# Patient Record
Sex: FEMALE | Race: WHITE | NOT HISPANIC OR LATINO | Employment: FULL TIME | URBAN - METROPOLITAN AREA
[De-identification: names, ages, dates, MRNs, and addresses within clinical notes are randomized per-mention and may not be internally consistent; named-entity substitution may affect disease eponyms.]

---

## 2017-02-08 ENCOUNTER — COMMUNICATION - HEALTHEAST (OUTPATIENT)
Dept: SURGERY | Facility: CLINIC | Age: 52
End: 2017-02-08

## 2017-08-12 ENCOUNTER — HEALTH MAINTENANCE LETTER (OUTPATIENT)
Age: 52
End: 2017-08-12

## 2017-08-15 ENCOUNTER — COMMUNICATION - HEALTHEAST (OUTPATIENT)
Dept: SURGERY | Facility: CLINIC | Age: 52
End: 2017-08-15

## 2017-08-15 ENCOUNTER — AMBULATORY - HEALTHEAST (OUTPATIENT)
Dept: SURGERY | Facility: CLINIC | Age: 52
End: 2017-08-15

## 2017-08-15 DIAGNOSIS — K90.9 UNSPECIFIED INTESTINAL MALABSORPTION: ICD-10-CM

## 2017-08-15 DIAGNOSIS — Z98.84 S/P BARIATRIC SURGERY: ICD-10-CM

## 2017-08-15 DIAGNOSIS — K91.2 OTHER AND UNSPECIFIED POSTSURGICAL NONABSORPTION: ICD-10-CM

## 2017-09-27 ENCOUNTER — OFFICE VISIT - HEALTHEAST (OUTPATIENT)
Dept: SURGERY | Facility: CLINIC | Age: 52
End: 2017-09-27

## 2017-09-27 ENCOUNTER — RECORDS - HEALTHEAST (OUTPATIENT)
Dept: ADMINISTRATIVE | Facility: OTHER | Age: 52
End: 2017-09-27

## 2017-09-27 DIAGNOSIS — K91.2 POSTOPERATIVE MALABSORPTION: ICD-10-CM

## 2017-09-27 DIAGNOSIS — E66.01 MORBID OBESITY (H): ICD-10-CM

## 2017-09-27 ASSESSMENT — MIFFLIN-ST. JEOR: SCORE: 1854.61

## 2017-12-20 ENCOUNTER — COMMUNICATION - HEALTHEAST (OUTPATIENT)
Dept: SURGERY | Facility: CLINIC | Age: 52
End: 2017-12-20

## 2018-07-06 ENCOUNTER — RADIANT APPOINTMENT (OUTPATIENT)
Dept: CARDIOLOGY | Facility: CLINIC | Age: 53
End: 2018-07-06
Payer: COMMERCIAL

## 2018-07-06 ENCOUNTER — OFFICE VISIT (OUTPATIENT)
Dept: CARDIOLOGY | Facility: CLINIC | Age: 53
End: 2018-07-06
Attending: INTERNAL MEDICINE
Payer: COMMERCIAL

## 2018-07-06 VITALS
HEIGHT: 66 IN | BODY MASS INDEX: 47.09 KG/M2 | DIASTOLIC BLOOD PRESSURE: 57 MMHG | HEART RATE: 65 BPM | WEIGHT: 293 LBS | SYSTOLIC BLOOD PRESSURE: 121 MMHG | OXYGEN SATURATION: 96 %

## 2018-07-06 DIAGNOSIS — Z95.2 S/P AVR: ICD-10-CM

## 2018-07-06 DIAGNOSIS — Z95.2 S/P AVR: Primary | ICD-10-CM

## 2018-07-06 PROCEDURE — 99213 OFFICE O/P EST LOW 20 MIN: CPT | Mod: ZP | Performed by: INTERNAL MEDICINE

## 2018-07-06 PROCEDURE — G0463 HOSPITAL OUTPT CLINIC VISIT: HCPCS | Mod: ZF

## 2018-07-06 ASSESSMENT — PAIN SCALES - GENERAL: PAINLEVEL: NO PAIN (0)

## 2018-07-06 NOTE — NURSING NOTE
Vitals completed successfully and medication reconciled. SMA Can  Chief Complaint   Patient presents with     Follow Up For     2 Yr F/U S/P AVR with Echo

## 2018-07-06 NOTE — MR AVS SNAPSHOT
After Visit Summary   7/6/2018    Halina Ibrahim    MRN: [id number]           Patient Information     Date Of Birth          1965        Visit Information        Provider Department      7/6/2018 1:00 PM Joshua Jamison MD [facility]        Today's Diagnoses     S/P AVR    -  1       Follow-ups after your visit        Additional Services     Follow-Up with Cardiologist                 Future tests that were ordered for you today     Open Future Orders        Priority Expected Expires Ordered    Follow-Up with Cardiologist Routine 7/5/2020 10/3/2020 7/6/2018            Who to contact     If you have questions or need follow up information about today's clinic visit or your schedule please contact [facility] directly at [phone].  Normal or non-critical lab and imaging results will be communicated to you by Savalanchehart, letter or phone within 4 business days after the clinic has received the results. If you do not hear from us within 7 days, please contact the clinic through Savalanchehart or phone. If you have a critical or abnormal lab result, we will notify you by phone as soon as possible.  Submit refill requests through Thinktwice or call your pharmacy and they will forward the refill request to us. Please allow 3 business days for your refill to be completed.          Additional Information About Your Visit        MyChart Information     Thinktwice gives you secure access to your electronic health record. If you see a primary care provider, you can also send messages to your care team and make appointments. If you have questions, please call your primary care clinic.  If you do not have a primary care provider, please call [phone] and they will assist you.        Care EveryWhere ID     This is your Care EveryWhere ID. This could be used by other organizations to access your [facility] medical records  [id number]        Your Vitals Were     Pulse Height Last Period Pulse  "Oximetry BMI (Body Mass Index)       65 1.676 m (5' 6\") (LMP Unknown) 96% 47.42 kg/m2        Blood Pressure from Last 3 Encounters:   07/06/18 121/57   08/08/16 132/84   08/04/14 141/82    Weight from Last 3 Encounters:   07/06/18 133.3 kg (293 lb 12.8 oz)   08/08/16 119.7 kg (263 lb 14.4 oz)   08/04/14 109.6 kg (241 lb 11.2 oz)               Primary Care Provider Office Phone # Fax #    Jeremias Garibay [phone]       [facility] [address] [address]        Equal Access to Services     RAUL MILAN : Hadii jefferson ribeiroo Sosia, waaxda luqadaha, qaybta kaalmada adeegyada, carlos a sheffield. So [facility] [phone].    ATENCIÓN: Si habla español, tiene a robb disposición servicios gratuitos de asistencia lingüística. [facility] [phone].    We comply with applicable federal civil rights laws and [place] laws. We do not discriminate on the basis of race, color, national origin, age, disability, sex, sexual orientation, or gender identity.            Thank you!     Thank you for choosing [facility]  for your care. Our goal is always to provide you with excellent care. Hearing back from our patients is one way we can continue to improve our services. Please take a few minutes to complete the written survey that you may receive in the mail after your visit with us. Thank you!             Your Updated Medication List - Protect others around you: Learn how to safely use, store and throw away your medicines at www.disposemymeds.org.          This list is accurate as of 7/6/18  3:43 PM.  Always use your most recent med list.                   Brand Name Dispense Instructions for use Diagnosis    ACETAMINOPHEN 8 HOUR 650 MG 8 hour tablet   Generic drug:  acetaminophen     250 tablet    Take 650 mg by mouth every 4 hours as needed.    S/P AVR       ACZONE 5 % Gel   Generic drug:  Dapsone           BIOTIN PO      Take 10,000 mcg by mouth daily     "    calcium 600 MG tablet      Take 1 tablet by mouth 2 times daily.        CHELATED IRON PO      25 mg. Take 1/2 tablet daily.        DAILY MULTIVITAMIN PO      Take 1 twice a day        gabapentin 300 MG capsule    NEURONTIN     Take 4 capsules by mouth daily.        LEVOTHROID 100 MCG tablet   Generic drug:  levothyroxine      Take 1 tablet by mouth daily.        losartan 50 MG tablet    COZAAR    90 tablet    Take 1 tablet by mouth daily.    Hypertension       oxyCODONE 40 MG 12 hr tablet    OxyCONTIN    24 tablet    Take 1 tablet by mouth 2 times daily.    S/P AVR       SUPER B COMPLEX Tabs      Take 1 BID        tretinoin 0.05 % cream    RETIN-A     Apply topically At Bedtime Apply every other day        vitamin D 25653 UNIT capsule    ERGOCALCIFEROL     Take 50,000 Units by mouth every 7 days.        warfarin 2.5 MG tablet    COUMADIN    90 tablet    Take 2 tablets by mouth daily.    S/P AVR

## 2018-07-06 NOTE — LETTER
7/6/2018      RE: Halina Ibrahim  [address] [address]       Dear Colleague,    Thank you for the opportunity to participate in the care of your patient, Halina Ibrahim, at the [facility]. Please see a copy of my visit note below.    CARDIOLOGY CLINIC FOLLOW UP    HPI: Halina Ibrahim is a 52 year old female being seen today for follow up of her valvular heart disease. She is status post MVR in 2010 with 27mm bileaflet [employer] mechanical valve. Subsequently she was found to have severe AI and underwent AVR in 2013 with 21 mm bileaflet [employer] mechanical valve. 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     She presents to the clinic today for a two year follow up. She has been doing well. She is not very active and doesn't follow an exercise routine. She denies having any chest pain, shortness of breath or lower extremities edema. Although she has had stable INRs, she experienced an episode of GI bleeding while vacationing in the [facility]. She had 5 units of PRBCs. Endoscopy was negative and bleeding problems was attributed to high INR. She has not experienced bruising or labile INRs here.    PAST MEDICAL HISTORY:  Past Medical History:   Diagnosis Date     Aortic regurgitation      S/P gastric bypass      S/P MVR (mitral valve replacement)      Stroke (H)        CURRENT MEDICATIONS:  Current Outpatient Prescriptions   Medication Sig Dispense Refill     ACETAMINOPHEN 8 HOUR 650 MG TABS Take 650 mg by mouth every 4 hours as needed. 250 tablet 0     B Complex-C (SUPER B COMPLEX) TABS Take 1 BID       BIOTIN PO Take 10,000 mcg by mouth daily       Calcium 600 MG tablet Take 1 tablet by mouth 2 times daily.       CHELATED IRON PO 25 mg. Take 1/2 tablet daily.        Dapsone (ACZONE) 5 % GEL        gabapentin (NEURONTIN) 300 MG capsule Take 4 capsules by mouth daily.       levothyroxine  (LEVOTHROID) 100 MCG tablet Take 1 tablet by mouth daily.       losartan (COZAAR) 50 MG tablet Take 1 tablet by mouth daily. 90 tablet 3     Multiple Vitamin (DAILY MULTIVITAMIN PO) Take 1 twice a day       oxyCODONE (OXYCONTIN) 40 MG 12 hr tablet Take 1 tablet by mouth 2 times daily. 24 tablet 0     tretinoin (RETIN-A) 0.05 % cream Apply topically At Bedtime Apply every other day       vitamin D (ERGOCALCIFEROL) 22054 UNIT capsule Take 50,000 Units by mouth every 7 days.       warfarin (COUMADIN) 2.5 MG tablet Take 2 tablets by mouth daily. 90 tablet 4     PAST SURGICAL HISTORY:  Past Surgical History:   Procedure Laterality Date     REDO STERNOTOMY REPLACE VALVE AORTIC  3/14/2013    Procedure: REDO STERNOTOMY REPLACE VALVE AORTIC;  Redo-Median  Sternotomy, Aortic root enlargement . aortic Valve  Replacement  on pump oxygenator;  Surgeon: Buck Paula MD;  Location:  OR     s/p gastric bypass[       s/p mitral valve replacement[       ALLERGIES  Penicillins    FAMILY HX:  No history of premature CAD and SCD.    SOCIAL HX:  History     Social History     Marital Status: Single     Spouse Name: N/A     Number of Children: N/A     Years of Education: N/A     Social History Main Topics     Smoking status: Former Smoker     Types: Cigarettes     Quit date: 03/17/2010     Smokeless tobacco: Never Used     Alcohol Use: No     Drug Use: No     Sexually Active: None     ROS:  Constitutional: No fever, chills, or sweats. No weight gain/loss. Positive for fatigue which is chronic  HEENT: No visual disturbance, ear ache, epistaxis, sore throat.   Allergies/Immunologic: Positive.   Respiratory: No cough, hemoptysis.   Cardiovascular: As per HPI.   GI: No nausea, vomiting, hematemesis, melena, or hematochezia.   : No urinary frequency, dysuria, or hematuria.   Integument: No rash.   Psychiatric: No anxiety / depression.   Neuro: No speech disturbance, focal sensory or motor deficit.   Endocrinology: No polyuria /  "polyphagia.   Musculoskeletal: No myalgia.    VITAL SIGNS:  /57 (BP Location: Left arm, Patient Position: Chair, Cuff Size: Adult Large)  Pulse 65  Ht 1.676 m (5' 6\")  Wt 133.3 kg (293 lb 12.8 oz)  LMP  (LMP Unknown)  SpO2 96%  BMI 47.42 kg/m2  Body mass index is 47.42 kg/(m^2).  Wt Readings from Last 2 Encounters:   07/06/18 133.3 kg (293 lb 12.8 oz)   08/08/16 119.7 kg (263 lb 14.4 oz)       PHYSICAL EXAM  Halina Ibrahim is a 50 year old female.in no acute distress.  HEENT: Unremarkable.  Neck: JVP normal.  Carotids +3/3 bilaterally without bruits.  Lungs: CTA.  Cor: RRR. Normal S1 and S2.  Aortic and mitral click present. The sternum is stable with minimal tenderness in the lower segment.  Abd: Soft, nontender, nondistended.  NABS.  No pulsatile mass.  Extremities: 1+ TAMIA.   Pulses +3/3 symmetric in upper and lower extremities.  Neuro: Grossly intact.    LABS  Labs were reviewed in care-everywhere. Patient had had normal CBC, BMP and lipid panel checked within last three months. LDL 85. INRs have been therapeutic.     ECHOCARDIOGRAM: 8/8/2016   Normal LV function, normal functioning aortic and mitral valve (MV mean gradient is 5 mmHg at HR of 60 and the aortic valve mean gradient is 26 mmHg). She had normal RVSP and normal IVC size reflecting normal volume status.     ASSESSMENT AND PLAN:   1. S/p MVR with normal functioning prosthesis.   2. S/p AVR with normal functioning prosthesis.  3. Anticoagulation with coumadin: adequate INRs with goal of 2.5-3.5.   3. Plan:  -- RTC in 24 months.  -- Encourage healthy diet and regular exercise to promote heart health and weight loss. The patient has gained 30 lbs between this and her clinic appointment in 2016.    Please do not hesitate to contact me if you have any questions/concerns.     Sincerely,     Joshua Jamison MD    CC  Patient Care Team:  Jeremias Garibay as PCP - General    "

## 2018-07-06 NOTE — NURSING NOTE
Med Reconcile: Reviewed and verified all current medications with the patient. The updated medication list was printed and given to the patient.  Return Appointment: Follow up in 2 years. Patient given instructions regarding scheduling next clinic visit. Patient demonstrated understanding of this information and agreed to call with further questions or concerns.  Patient stated she understood all health information given and agreed to call with further questions or concerns.

## 2018-07-06 NOTE — PROGRESS NOTES
CARDIOLOGY CLINIC FOLLOW UP    HPI: Halina Ibrahim is a 52 year old female being seen today for follow up of her valvular heart disease. She is status post MVR in 2010 with 27mm bileaflet [employer] [place] mechanical valve. Subsequently she was found to have severe AI and underwent AVR in 2013 with 21 mm bileaflet [employer] [place] mechanical valve. 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     She presents to the clinic today for a two year follow up. She has been doing well. She is not very active and doesn't follow an exercise routine. She denies having any chest pain, shortness of breath or lower extremities edema. Although she has had stable INRs, she experienced an episode of GI bleeding while vacationing in the [facility]. She had 5 units of PRBCs. Endoscopy was negative and bleeding problems was attributed to high INR. She has not experienced bruising or labile INRs here.    PAST MEDICAL HISTORY:  Past Medical History:   Diagnosis Date     Aortic regurgitation      S/P gastric bypass      S/P MVR (mitral valve replacement)      Stroke (H)        CURRENT MEDICATIONS:  Current Outpatient Prescriptions   Medication Sig Dispense Refill     ACETAMINOPHEN 8 HOUR 650 MG TABS Take 650 mg by mouth every 4 hours as needed. 250 tablet 0     B Complex-C (SUPER B COMPLEX) TABS Take 1 BID       BIOTIN PO Take 10,000 mcg by mouth daily       Calcium 600 MG tablet Take 1 tablet by mouth 2 times daily.       CHELATED IRON PO 25 mg. Take 1/2 tablet daily.        Dapsone (ACZONE) 5 % GEL        gabapentin (NEURONTIN) 300 MG capsule Take 4 capsules by mouth daily.       levothyroxine (LEVOTHROID) 100 MCG tablet Take 1 tablet by mouth daily.       losartan (COZAAR) 50 MG tablet Take 1 tablet by mouth daily. 90 tablet 3     Multiple Vitamin (DAILY MULTIVITAMIN PO) Take 1 twice a day       oxyCODONE (OXYCONTIN) 40 MG 12 hr tablet Take 1 tablet by mouth 2 times daily. 24 tablet 0      "tretinoin (RETIN-A) 0.05 % cream Apply topically At Bedtime Apply every other day       vitamin D (ERGOCALCIFEROL) 22159 UNIT capsule Take 50,000 Units by mouth every 7 days.       warfarin (COUMADIN) 2.5 MG tablet Take 2 tablets by mouth daily. 90 tablet 4     PAST SURGICAL HISTORY:  Past Surgical History:   Procedure Laterality Date     REDO STERNOTOMY REPLACE VALVE AORTIC  3/14/2013    Procedure: REDO STERNOTOMY REPLACE VALVE AORTIC;  Redo-Median  Sternotomy, Aortic root enlargement . aortic Valve  Replacement  on pump oxygenator;  Surgeon: Buck Paula MD;  Location:  OR     s/p gastric bypass[       s/p mitral valve replacement[       ALLERGIES  Penicillins    FAMILY HX:  No history of premature CAD and SCD.    SOCIAL HX:  History     Social History     Marital Status: Single     Spouse Name: N/A     Number of Children: N/A     Years of Education: N/A     Social History Main Topics     Smoking status: Former Smoker     Types: Cigarettes     Quit date: 03/17/2010     Smokeless tobacco: Never Used     Alcohol Use: No     Drug Use: No     Sexually Active: None     ROS:  Constitutional: No fever, chills, or sweats. No weight gain/loss. Positive for fatigue which is chronic  HEENT: No visual disturbance, ear ache, epistaxis, sore throat.   Allergies/Immunologic: Positive.   Respiratory: No cough, hemoptysis.   Cardiovascular: As per HPI.   GI: No nausea, vomiting, hematemesis, melena, or hematochezia.   : No urinary frequency, dysuria, or hematuria.   Integument: No rash.   Psychiatric: No anxiety / depression.   Neuro: No speech disturbance, focal sensory or motor deficit.   Endocrinology: No polyuria / polyphagia.   Musculoskeletal: No myalgia.    VITAL SIGNS:  /57 (BP Location: Left arm, Patient Position: Chair, Cuff Size: Adult Large)  Pulse 65  Ht 1.676 m (5' 6\")  Wt 133.3 kg (293 lb 12.8 oz)  LMP  (LMP Unknown)  SpO2 96%  BMI 47.42 kg/m2  Body mass index is 47.42 kg/(m^2).  Wt " Readings from Last 2 Encounters:   07/06/18 133.3 kg (293 lb 12.8 oz)   08/08/16 119.7 kg (263 lb 14.4 oz)       PHYSICAL EXAM  Halina Ibrahim is a 50 year old female.in no acute distress.  HEENT: Unremarkable.  Neck: JVP normal.  Carotids +3/3 bilaterally without bruits.  Lungs: CTA.  Cor: RRR. Normal S1 and S2.  Aortic and mitral click present. The sternum is stable with minimal tenderness in the lower segment.  Abd: Soft, nontender, nondistended.  NABS.  No pulsatile mass.  Extremities: 1+ TAMIA.   Pulses +3/3 symmetric in upper and lower extremities.  Neuro: Grossly intact.    LABS  Labs were reviewed in care-everywhere. Patient had had normal CBC, BMP and lipid panel checked within last three months. LDL 85. INRs have been therapeutic.     ECHOCARDIOGRAM: 8/8/2016   Normal LV function, normal functioning aortic and mitral valve (MV mean gradient is 5 mmHg at HR of 60 and the aortic valve mean gradient is 26 mmHg). She had normal RVSP and normal IVC size reflecting normal volume status.     ASSESSMENT AND PLAN:   1. S/p MVR with normal functioning prosthesis.   2. S/p AVR with normal functioning prosthesis.  3. Anticoagulation with coumadin: adequate INRs with goal of 2.5-3.5.   3. Plan:  -- RTC in 24 months.  -- Encourage healthy diet and regular exercise to promote heart health and weight loss. The patient has gained 30 lbs between this and her clinic appointment in 2016.    CC  Patient Care Team:  Jeremias Garibay as PCP - General

## 2018-07-17 ENCOUNTER — AMBULATORY - HEALTHEAST (OUTPATIENT)
Dept: SURGERY | Facility: CLINIC | Age: 53
End: 2018-07-17

## 2018-07-17 DIAGNOSIS — E78.5 DYSLIPIDEMIA: ICD-10-CM

## 2018-07-17 DIAGNOSIS — Z98.84 HISTORY OF ROUX-EN-Y GASTRIC BYPASS: ICD-10-CM

## 2018-07-17 DIAGNOSIS — K90.9 INTESTINAL MALABSORPTION: ICD-10-CM

## 2018-09-18 ENCOUNTER — COMMUNICATION - HEALTHEAST (OUTPATIENT)
Dept: SURGERY | Facility: CLINIC | Age: 53
End: 2018-09-18

## 2018-09-19 ENCOUNTER — AMBULATORY - HEALTHEAST (OUTPATIENT)
Dept: SURGERY | Facility: CLINIC | Age: 53
End: 2018-09-19

## 2018-09-19 ENCOUNTER — COMMUNICATION - HEALTHEAST (OUTPATIENT)
Dept: SURGERY | Facility: CLINIC | Age: 53
End: 2018-09-19

## 2018-09-19 DIAGNOSIS — K91.2 POSTOPERATIVE MALABSORPTION: ICD-10-CM

## 2018-09-26 ENCOUNTER — COMMUNICATION - HEALTHEAST (OUTPATIENT)
Dept: SURGERY | Facility: CLINIC | Age: 53
End: 2018-09-26

## 2018-09-26 ENCOUNTER — OFFICE VISIT - HEALTHEAST (OUTPATIENT)
Dept: SURGERY | Facility: CLINIC | Age: 53
End: 2018-09-26

## 2018-09-26 DIAGNOSIS — E66.01 MORBID OBESITY (H): ICD-10-CM

## 2018-09-26 DIAGNOSIS — K91.2 POSTOPERATIVE MALABSORPTION: ICD-10-CM

## 2018-09-26 ASSESSMENT — MIFFLIN-ST. JEOR: SCORE: 1945.33

## 2018-10-01 ENCOUNTER — AMBULATORY - HEALTHEAST (OUTPATIENT)
Dept: SURGERY | Facility: CLINIC | Age: 53
End: 2018-10-01

## 2018-10-01 DIAGNOSIS — K91.2 POSTOPERATIVE MALABSORPTION: ICD-10-CM

## 2018-10-01 DIAGNOSIS — Z78.0 MENOPAUSE: ICD-10-CM

## 2018-10-24 ENCOUNTER — RECORDS - HEALTHEAST (OUTPATIENT)
Dept: BONE DENSITY | Facility: CLINIC | Age: 53
End: 2018-10-24

## 2018-10-24 ENCOUNTER — RECORDS - HEALTHEAST (OUTPATIENT)
Dept: ADMINISTRATIVE | Facility: OTHER | Age: 53
End: 2018-10-24

## 2018-10-24 DIAGNOSIS — Z78.0 ASYMPTOMATIC MENOPAUSAL STATE: ICD-10-CM

## 2018-10-24 DIAGNOSIS — K91.2 POSTSURGICAL MALABSORPTION, NOT ELSEWHERE CLASSIFIED: ICD-10-CM

## 2018-12-06 ENCOUNTER — RECORDS - HEALTHEAST (OUTPATIENT)
Dept: ADMINISTRATIVE | Facility: OTHER | Age: 53
End: 2018-12-06

## 2018-12-20 ENCOUNTER — COMMUNICATION - HEALTHEAST (OUTPATIENT)
Dept: SURGERY | Facility: CLINIC | Age: 53
End: 2018-12-20

## 2018-12-20 DIAGNOSIS — K91.2 POSTOPERATIVE MALABSORPTION: ICD-10-CM

## 2018-12-26 ENCOUNTER — COMMUNICATION - HEALTHEAST (OUTPATIENT)
Dept: SURGERY | Facility: CLINIC | Age: 53
End: 2018-12-26

## 2019-01-22 ENCOUNTER — AMBULATORY - HEALTHEAST (OUTPATIENT)
Dept: SURGERY | Facility: CLINIC | Age: 54
End: 2019-01-22

## 2019-01-22 DIAGNOSIS — K91.2 POSTOPERATIVE MALABSORPTION: ICD-10-CM

## 2019-03-01 ENCOUNTER — COMMUNICATION - HEALTHEAST (OUTPATIENT)
Dept: SURGERY | Facility: CLINIC | Age: 54
End: 2019-03-01

## 2019-04-11 ENCOUNTER — COMMUNICATION - HEALTHEAST (OUTPATIENT)
Dept: SURGERY | Facility: CLINIC | Age: 54
End: 2019-04-11

## 2019-04-14 ENCOUNTER — COMMUNICATION - HEALTHEAST (OUTPATIENT)
Dept: SURGERY | Facility: CLINIC | Age: 54
End: 2019-04-14

## 2019-04-14 DIAGNOSIS — K91.2 POSTOPERATIVE MALABSORPTION: ICD-10-CM

## 2019-05-12 ENCOUNTER — COMMUNICATION - HEALTHEAST (OUTPATIENT)
Dept: SURGERY | Facility: CLINIC | Age: 54
End: 2019-05-12

## 2019-05-12 DIAGNOSIS — K91.2 POSTOPERATIVE MALABSORPTION: ICD-10-CM

## 2019-06-19 ENCOUNTER — COMMUNICATION - HEALTHEAST (OUTPATIENT)
Dept: SURGERY | Facility: CLINIC | Age: 54
End: 2019-06-19

## 2019-06-19 ENCOUNTER — AMBULATORY - HEALTHEAST (OUTPATIENT)
Dept: SURGERY | Facility: CLINIC | Age: 54
End: 2019-06-19

## 2019-06-19 DIAGNOSIS — K91.2 POSTSURGICAL MALABSORPTION: ICD-10-CM

## 2019-06-19 DIAGNOSIS — R79.89 LOW VITAMIN D LEVEL: ICD-10-CM

## 2019-06-19 DIAGNOSIS — E21.3 HYPERPARATHYROIDISM (H): ICD-10-CM

## 2019-07-24 ENCOUNTER — RECORDS - HEALTHEAST (OUTPATIENT)
Dept: ADMINISTRATIVE | Facility: OTHER | Age: 54
End: 2019-07-24

## 2019-08-07 ENCOUNTER — AMBULATORY - HEALTHEAST (OUTPATIENT)
Dept: SURGERY | Facility: CLINIC | Age: 54
End: 2019-08-07

## 2019-09-02 ENCOUNTER — COMMUNICATION - HEALTHEAST (OUTPATIENT)
Dept: SURGERY | Facility: CLINIC | Age: 54
End: 2019-09-02

## 2019-09-04 ENCOUNTER — AMBULATORY - HEALTHEAST (OUTPATIENT)
Dept: SURGERY | Facility: CLINIC | Age: 54
End: 2019-09-04

## 2019-09-04 ENCOUNTER — COMMUNICATION - HEALTHEAST (OUTPATIENT)
Dept: SURGERY | Facility: CLINIC | Age: 54
End: 2019-09-04

## 2019-09-04 DIAGNOSIS — K90.9 INTESTINAL MALABSORPTION: ICD-10-CM

## 2019-09-04 DIAGNOSIS — K91.2 POSTOPERATIVE MALABSORPTION: ICD-10-CM

## 2019-09-04 DIAGNOSIS — Z98.84 HISTORY OF ROUX-EN-Y GASTRIC BYPASS: ICD-10-CM

## 2019-09-04 DIAGNOSIS — E78.5 DYSLIPIDEMIA: ICD-10-CM

## 2019-09-04 DIAGNOSIS — K91.2 POSTSURGICAL MALABSORPTION: ICD-10-CM

## 2019-09-04 DIAGNOSIS — E66.01 MORBID OBESITY (H): ICD-10-CM

## 2019-09-04 DIAGNOSIS — Z98.84 S/P BARIATRIC SURGERY: ICD-10-CM

## 2019-09-04 DIAGNOSIS — R79.89 LOW VITAMIN D LEVEL: ICD-10-CM

## 2019-09-04 DIAGNOSIS — Z78.0 MENOPAUSE: ICD-10-CM

## 2019-09-04 DIAGNOSIS — E21.3 HYPERPARATHYROIDISM (H): ICD-10-CM

## 2019-09-06 ENCOUNTER — RECORDS - HEALTHEAST (OUTPATIENT)
Dept: ADMINISTRATIVE | Facility: OTHER | Age: 54
End: 2019-09-06

## 2019-09-06 LAB — HBA1C MFR BLD: 5.6 % (ref 0–5.6)

## 2019-09-12 ENCOUNTER — RECORDS - HEALTHEAST (OUTPATIENT)
Dept: HEALTH INFORMATION MANAGEMENT | Facility: CLINIC | Age: 54
End: 2019-09-12

## 2019-09-25 ENCOUNTER — OFFICE VISIT - HEALTHEAST (OUTPATIENT)
Dept: SURGERY | Facility: CLINIC | Age: 54
End: 2019-09-25

## 2019-09-25 DIAGNOSIS — E66.01 MORBID OBESITY (H): ICD-10-CM

## 2019-09-25 DIAGNOSIS — K91.2 POSTSURGICAL MALABSORPTION: ICD-10-CM

## 2019-09-25 DIAGNOSIS — Z86.69 HISTORY OF MIGRAINE HEADACHES: ICD-10-CM

## 2019-09-25 ASSESSMENT — MIFFLIN-ST. JEOR: SCORE: 2102.72

## 2019-10-10 ENCOUNTER — OFFICE VISIT - HEALTHEAST (OUTPATIENT)
Dept: SURGERY | Facility: CLINIC | Age: 54
End: 2019-10-10

## 2019-10-10 DIAGNOSIS — Z71.3 NUTRITIONAL COUNSELING: ICD-10-CM

## 2019-10-10 DIAGNOSIS — Z98.84 BARIATRIC SURGERY STATUS: ICD-10-CM

## 2019-10-10 DIAGNOSIS — E66.01 OBESITY, MORBID, BMI 50 OR HIGHER (H): ICD-10-CM

## 2019-10-10 ASSESSMENT — MIFFLIN-ST. JEOR: SCORE: 2068.7

## 2019-11-06 ENCOUNTER — HEALTH MAINTENANCE LETTER (OUTPATIENT)
Age: 54
End: 2019-11-06

## 2019-11-14 ENCOUNTER — OFFICE VISIT - HEALTHEAST (OUTPATIENT)
Dept: SURGERY | Facility: CLINIC | Age: 54
End: 2019-11-14

## 2019-11-14 DIAGNOSIS — Z71.3 NUTRITIONAL COUNSELING: ICD-10-CM

## 2019-11-14 DIAGNOSIS — Z98.84 BARIATRIC SURGERY STATUS: ICD-10-CM

## 2019-11-14 DIAGNOSIS — E66.01 OBESITY, MORBID, BMI 50 OR HIGHER (H): ICD-10-CM

## 2019-11-14 ASSESSMENT — MIFFLIN-ST. JEOR: SCORE: 2040.13

## 2019-11-26 ENCOUNTER — OFFICE VISIT - HEALTHEAST (OUTPATIENT)
Dept: SURGERY | Facility: CLINIC | Age: 54
End: 2019-11-26

## 2019-11-26 DIAGNOSIS — Z86.69 HISTORY OF MIGRAINE HEADACHES: ICD-10-CM

## 2019-11-26 DIAGNOSIS — E66.01 OBESITY, MORBID, BMI 50 OR HIGHER (H): ICD-10-CM

## 2019-11-26 DIAGNOSIS — K91.2 POSTSURGICAL MALABSORPTION: ICD-10-CM

## 2019-11-26 DIAGNOSIS — E66.01 MORBID OBESITY (H): ICD-10-CM

## 2019-11-26 ASSESSMENT — MIFFLIN-ST. JEOR: SCORE: 2036.04

## 2019-12-19 ENCOUNTER — OFFICE VISIT - HEALTHEAST (OUTPATIENT)
Dept: SURGERY | Facility: CLINIC | Age: 54
End: 2019-12-19

## 2019-12-19 DIAGNOSIS — Z71.3 NUTRITIONAL COUNSELING: ICD-10-CM

## 2019-12-19 DIAGNOSIS — E66.01 OBESITY, MORBID, BMI 50 OR HIGHER (H): ICD-10-CM

## 2019-12-19 DIAGNOSIS — Z98.84 BARIATRIC SURGERY STATUS: ICD-10-CM

## 2019-12-19 ASSESSMENT — MIFFLIN-ST. JEOR: SCORE: 2025.16

## 2020-03-16 ENCOUNTER — COMMUNICATION - HEALTHEAST (OUTPATIENT)
Dept: ADMINISTRATIVE | Facility: CLINIC | Age: 55
End: 2020-03-16

## 2020-03-23 ENCOUNTER — COMMUNICATION - HEALTHEAST (OUTPATIENT)
Dept: SURGERY | Facility: CLINIC | Age: 55
End: 2020-03-23

## 2020-03-23 DIAGNOSIS — Z86.69 HISTORY OF MIGRAINE HEADACHES: ICD-10-CM

## 2020-03-23 DIAGNOSIS — E66.01 MORBID OBESITY (H): ICD-10-CM

## 2020-03-25 ENCOUNTER — OFFICE VISIT - HEALTHEAST (OUTPATIENT)
Dept: SURGERY | Facility: CLINIC | Age: 55
End: 2020-03-25

## 2020-03-25 DIAGNOSIS — Z86.69 HISTORY OF MIGRAINE HEADACHES: ICD-10-CM

## 2020-03-25 DIAGNOSIS — K91.2 POSTOPERATIVE MALABSORPTION: ICD-10-CM

## 2020-03-25 DIAGNOSIS — E66.01 MORBID OBESITY (H): ICD-10-CM

## 2020-03-26 ENCOUNTER — OFFICE VISIT - HEALTHEAST (OUTPATIENT)
Dept: SURGERY | Facility: CLINIC | Age: 55
End: 2020-03-26

## 2020-03-26 DIAGNOSIS — Z71.3 NUTRITIONAL COUNSELING: ICD-10-CM

## 2020-03-26 DIAGNOSIS — E66.01 MORBID OBESITY WITH BMI OF 50.0-59.9, ADULT (H): ICD-10-CM

## 2020-03-26 DIAGNOSIS — K91.2 POSTOPERATIVE MALABSORPTION: ICD-10-CM

## 2020-03-26 DIAGNOSIS — Z98.84 BARIATRIC SURGERY STATUS: ICD-10-CM

## 2020-03-26 ASSESSMENT — MIFFLIN-ST. JEOR: SCORE: 2024.7

## 2020-05-05 ENCOUNTER — OFFICE VISIT - HEALTHEAST (OUTPATIENT)
Dept: SURGERY | Facility: CLINIC | Age: 55
End: 2020-05-05

## 2020-05-05 DIAGNOSIS — E66.01 MORBID OBESITY WITH BMI OF 50.0-59.9, ADULT (H): ICD-10-CM

## 2020-05-05 DIAGNOSIS — Z98.84 BARIATRIC SURGERY STATUS: ICD-10-CM

## 2020-05-05 DIAGNOSIS — K91.2 POSTOPERATIVE MALABSORPTION: ICD-10-CM

## 2020-05-05 DIAGNOSIS — Z71.3 NUTRITIONAL COUNSELING: ICD-10-CM

## 2020-05-05 ASSESSMENT — MIFFLIN-ST. JEOR: SCORE: 2024.7

## 2020-05-06 ENCOUNTER — COMMUNICATION - HEALTHEAST (OUTPATIENT)
Dept: SURGERY | Facility: CLINIC | Age: 55
End: 2020-05-06

## 2020-06-12 ENCOUNTER — OFFICE VISIT - HEALTHEAST (OUTPATIENT)
Dept: SURGERY | Facility: CLINIC | Age: 55
End: 2020-06-12

## 2020-06-12 DIAGNOSIS — Z98.84 BARIATRIC SURGERY STATUS: ICD-10-CM

## 2020-06-12 DIAGNOSIS — E66.01 MORBID OBESITY WITH BMI OF 50.0-59.9, ADULT (H): ICD-10-CM

## 2020-06-12 DIAGNOSIS — Z71.3 NUTRITIONAL COUNSELING: ICD-10-CM

## 2020-06-12 ASSESSMENT — MIFFLIN-ST. JEOR: SCORE: 2024.7

## 2020-06-24 ENCOUNTER — AMBULATORY - HEALTHEAST (OUTPATIENT)
Dept: SURGERY | Facility: CLINIC | Age: 55
End: 2020-06-24

## 2020-06-24 ENCOUNTER — OFFICE VISIT - HEALTHEAST (OUTPATIENT)
Dept: SURGERY | Facility: CLINIC | Age: 55
End: 2020-06-24

## 2020-06-24 DIAGNOSIS — E66.01 MORBID OBESITY (H): ICD-10-CM

## 2020-06-24 DIAGNOSIS — Z98.84 BARIATRIC SURGERY STATUS: ICD-10-CM

## 2020-06-24 ASSESSMENT — MIFFLIN-ST. JEOR: SCORE: 1938.97

## 2020-06-26 ENCOUNTER — COMMUNICATION - HEALTHEAST (OUTPATIENT)
Dept: SURGERY | Facility: CLINIC | Age: 55
End: 2020-06-26

## 2020-07-16 ENCOUNTER — OFFICE VISIT - HEALTHEAST (OUTPATIENT)
Dept: SURGERY | Facility: CLINIC | Age: 55
End: 2020-07-16

## 2020-07-16 DIAGNOSIS — E66.01 OBESITY, CLASS III, BMI 40-49.9 (MORBID OBESITY) (H): ICD-10-CM

## 2020-07-16 DIAGNOSIS — Z71.3 NUTRITIONAL COUNSELING: ICD-10-CM

## 2020-07-16 DIAGNOSIS — Z98.84 BARIATRIC SURGERY STATUS: ICD-10-CM

## 2020-07-16 ASSESSMENT — MIFFLIN-ST. JEOR: SCORE: 1939.88

## 2020-08-05 ENCOUNTER — COMMUNICATION - HEALTHEAST (OUTPATIENT)
Dept: SURGERY | Facility: CLINIC | Age: 55
End: 2020-08-05

## 2020-08-05 DIAGNOSIS — E21.3 HYPERPARATHYROIDISM (H): ICD-10-CM

## 2020-08-05 DIAGNOSIS — K91.2 POSTOPERATIVE MALABSORPTION: ICD-10-CM

## 2020-08-05 DIAGNOSIS — R79.89 LOW VITAMIN D LEVEL: ICD-10-CM

## 2020-08-20 ENCOUNTER — OFFICE VISIT - HEALTHEAST (OUTPATIENT)
Dept: SURGERY | Facility: CLINIC | Age: 55
End: 2020-08-20

## 2020-08-20 DIAGNOSIS — E66.01 OBESITY, CLASS III, BMI 40-49.9 (MORBID OBESITY) (H): ICD-10-CM

## 2020-08-20 DIAGNOSIS — Z98.84 BARIATRIC SURGERY STATUS: ICD-10-CM

## 2020-08-20 DIAGNOSIS — Z71.3 NUTRITIONAL COUNSELING: ICD-10-CM

## 2020-08-20 ASSESSMENT — MIFFLIN-ST. JEOR: SCORE: 1939.88

## 2020-09-16 ENCOUNTER — COMMUNICATION - HEALTHEAST (OUTPATIENT)
Dept: SURGERY | Facility: CLINIC | Age: 55
End: 2020-09-16

## 2020-09-16 DIAGNOSIS — Z86.69 HISTORY OF MIGRAINE HEADACHES: ICD-10-CM

## 2020-09-16 DIAGNOSIS — E66.01 MORBID OBESITY (H): ICD-10-CM

## 2020-09-22 ENCOUNTER — OFFICE VISIT - HEALTHEAST (OUTPATIENT)
Dept: SURGERY | Facility: CLINIC | Age: 55
End: 2020-09-22

## 2020-09-22 DIAGNOSIS — R79.89 ELEVATED FERRITIN: ICD-10-CM

## 2020-09-22 DIAGNOSIS — K91.2 POSTOPERATIVE MALABSORPTION: ICD-10-CM

## 2020-09-25 LAB
IRON SATN MFR SERPL: 14 % (ref 20–50)
IRON SERPL-MCNC: 42 UG/DL (ref 42–175)
TIBC SERPL-MCNC: 302 UG/DL (ref 313–563)
TRANSFERRIN SERPL-MCNC: 242 MG/DL (ref 212–360)

## 2020-09-29 ENCOUNTER — COMMUNICATION - HEALTHEAST (OUTPATIENT)
Dept: SURGERY | Facility: CLINIC | Age: 55
End: 2020-09-29

## 2020-10-18 ENCOUNTER — COMMUNICATION - HEALTHEAST (OUTPATIENT)
Dept: SURGERY | Facility: CLINIC | Age: 55
End: 2020-10-18

## 2020-10-18 DIAGNOSIS — Z86.69 HISTORY OF MIGRAINE HEADACHES: ICD-10-CM

## 2020-10-18 DIAGNOSIS — E66.01 MORBID OBESITY (H): ICD-10-CM

## 2020-10-22 ENCOUNTER — OFFICE VISIT - HEALTHEAST (OUTPATIENT)
Dept: SURGERY | Facility: CLINIC | Age: 55
End: 2020-10-22

## 2020-10-22 DIAGNOSIS — Z98.84 BARIATRIC SURGERY STATUS: ICD-10-CM

## 2020-10-22 DIAGNOSIS — E66.01 OBESITY, CLASS III, BMI 40-49.9 (MORBID OBESITY) (H): ICD-10-CM

## 2020-10-22 DIAGNOSIS — Z71.3 NUTRITIONAL COUNSELING: ICD-10-CM

## 2020-10-22 ASSESSMENT — MIFFLIN-ST. JEOR: SCORE: 1936.25

## 2020-11-18 ENCOUNTER — COMMUNICATION - HEALTHEAST (OUTPATIENT)
Dept: SURGERY | Facility: CLINIC | Age: 55
End: 2020-11-18

## 2020-11-18 DIAGNOSIS — Z86.69 HISTORY OF MIGRAINE HEADACHES: ICD-10-CM

## 2020-11-18 DIAGNOSIS — E66.01 MORBID OBESITY (H): ICD-10-CM

## 2020-11-22 ENCOUNTER — HEALTH MAINTENANCE LETTER (OUTPATIENT)
Age: 55
End: 2020-11-22

## 2020-12-03 ENCOUNTER — OFFICE VISIT - HEALTHEAST (OUTPATIENT)
Dept: SURGERY | Facility: CLINIC | Age: 55
End: 2020-12-03

## 2020-12-03 DIAGNOSIS — Z98.84 BARIATRIC SURGERY STATUS: ICD-10-CM

## 2020-12-03 DIAGNOSIS — E66.01 OBESITY, CLASS III, BMI 40-49.9 (MORBID OBESITY) (H): ICD-10-CM

## 2020-12-03 DIAGNOSIS — Z71.3 NUTRITIONAL COUNSELING: ICD-10-CM

## 2020-12-03 ASSESSMENT — MIFFLIN-ST. JEOR: SCORE: 1904.05

## 2021-01-07 ENCOUNTER — OFFICE VISIT - HEALTHEAST (OUTPATIENT)
Dept: SURGERY | Facility: CLINIC | Age: 56
End: 2021-01-07

## 2021-01-07 DIAGNOSIS — E66.01 OBESITY, CLASS III, BMI 40-49.9 (MORBID OBESITY) (H): ICD-10-CM

## 2021-01-07 DIAGNOSIS — Z98.84 BARIATRIC SURGERY STATUS: ICD-10-CM

## 2021-01-07 DIAGNOSIS — Z71.3 NUTRITIONAL COUNSELING: ICD-10-CM

## 2021-01-07 ASSESSMENT — MIFFLIN-ST. JEOR: SCORE: 1903.59

## 2021-02-11 ENCOUNTER — OFFICE VISIT - HEALTHEAST (OUTPATIENT)
Dept: SURGERY | Facility: CLINIC | Age: 56
End: 2021-02-11

## 2021-02-11 DIAGNOSIS — E66.01 OBESITY, CLASS III, BMI 40-49.9 (MORBID OBESITY) (H): ICD-10-CM

## 2021-02-11 DIAGNOSIS — Z71.3 NUTRITIONAL COUNSELING: ICD-10-CM

## 2021-02-11 DIAGNOSIS — Z98.84 BARIATRIC SURGERY STATUS: ICD-10-CM

## 2021-02-11 ASSESSMENT — MIFFLIN-ST. JEOR: SCORE: 1879.1

## 2021-03-11 ENCOUNTER — OFFICE VISIT - HEALTHEAST (OUTPATIENT)
Dept: SURGERY | Facility: CLINIC | Age: 56
End: 2021-03-11

## 2021-03-11 DIAGNOSIS — Z71.3 NUTRITIONAL COUNSELING: ICD-10-CM

## 2021-03-11 DIAGNOSIS — E66.01 OBESITY, CLASS III, BMI 40-49.9 (MORBID OBESITY) (H): ICD-10-CM

## 2021-03-11 DIAGNOSIS — Z98.84 BARIATRIC SURGERY STATUS: ICD-10-CM

## 2021-03-11 ASSESSMENT — MIFFLIN-ST. JEOR: SCORE: 1879.55

## 2021-04-08 ENCOUNTER — COMMUNICATION - HEALTHEAST (OUTPATIENT)
Dept: SURGERY | Facility: CLINIC | Age: 56
End: 2021-04-08

## 2021-05-18 ENCOUNTER — COMMUNICATION - HEALTHEAST (OUTPATIENT)
Dept: SURGERY | Facility: CLINIC | Age: 56
End: 2021-05-18

## 2021-05-18 DIAGNOSIS — E66.01 MORBID OBESITY (H): ICD-10-CM

## 2021-05-18 DIAGNOSIS — Z86.69 HISTORY OF MIGRAINE HEADACHES: ICD-10-CM

## 2021-05-19 ENCOUNTER — AMBULATORY - HEALTHEAST (OUTPATIENT)
Dept: SURGERY | Facility: CLINIC | Age: 56
End: 2021-05-19

## 2021-05-19 ASSESSMENT — MIFFLIN-ST. JEOR: SCORE: 1869.12

## 2021-05-27 VITALS — HEIGHT: 66 IN | WEIGHT: 277.2 LBS | BODY MASS INDEX: 44.55 KG/M2

## 2021-05-27 NOTE — TELEPHONE ENCOUNTER
Pt did her f/u vitamin D and PTH at [facility] per .  Routed to the doctor for her review.    Vivian Steiner RN, CBN  [facility]  P [phone]  F [phone]

## 2021-05-29 NOTE — PROGRESS NOTES
Vit D and PTH in 3 mos per      Orders placed for recheck labs per  and mailed to pt at home per her request.    Vivian Steiner RN, N  [facility]  P [phone]  F [phone]

## 2021-05-31 VITALS — HEIGHT: 66 IN | WEIGHT: 274 LBS | BODY MASS INDEX: 44.03 KG/M2

## 2021-06-01 NOTE — TELEPHONE ENCOUNTER
I faxed signed lab orders to [facility], F: [phone].    Félix Calles [facility]  P: [phone]  F: [phone]

## 2021-06-01 NOTE — PATIENT INSTRUCTIONS - HE
[facility] Bariatric Care  Nutritional Guidelines  Gastric Bypass 18 Months Post Op and Beyond    General Guidelines and Helpful Hints:    Eat 3 meals per day + protein supplement(s). No snacks between meals.  o Do not skip meals.  This can cause overeating at the next meal and will prevent adequate protein and nutritional intake.    Aim for 60-80 grams of protein per day.  o Always eat your protein first. This assists with optimal nutrition and helps you stay full longer.  o Depending on your portion size, you may need to drink approved protein supplement between meals to achieve protein goals. Follow recommendations of your Dietitian.     Eat your protein first, and then follow with fiber.   o It is not necessary to count your fiber, but 15-20 grams per day is recommended.    o Add fiber by including fruits, vegetables, whole grains, and beans.     Portions should remain about 1 cup per meal. Use measuring cups to be accurate.    Continue to use saucer/salad plates, infant/toddler silverware to keep portion sizes small and take small bites.    Eat S-L-O-W-L-Y to make each meal last 20-30 minutes. Always stop eating when satisfied.    Continue to use caution with foods containing skins, peels or membranes. Chew well!    Aim for 64 oz. of calorie-free fluids daily.  o Continue to avoid caffeine and carbonation. If you choose to drink alcohol, do so in moderation.   o Remember to avoid drinking during meals, 15-30 minutes before and 30 minutes after.    Exercise is brady for continued weight loss and weight maintenance. Aim for 30-60 minutes of physical activity most days of the week. Include cardiovascular and strength training.    If having trouble tolerating meat, try using a crock-pot, tinfoil tent, steamer or other moist cooking method to create tender meats. Add broth or low-fat gravy to help meat stay moist.     Avoid high sugar and high fat foods to prevent dumping syndrome.  o Check nutrition labels for less  than 10 grams of sugar and less than 10 grams of fat per serving.    Continue Taking Vitamins/Minerals:  o 5805-3609 mcg of Sublingual B-12 daily  o 1 Multivitamin with Iron twice daily (chewable or swallow tabs)  o 500-600 mg Calcium Citrate twice daily (chewable or swallow tabs)  o 5000 IU Vitamin D3 daily    Sample Grocery List    Protein:    Fat free Greek or light yogurt (less than 10 grams sugar)    Fat free or low-fat cottage cheese    String cheese or reduced fat cheese slices    Tuna, salmon, crab, egg, or chicken salad made with light or fat free mayonnaise    Egg or Egg Substitute    Lean/extra lean turkey, beef, bison, venison (ground, sirloin, round, flank)    Pork loin or tenderloin (grilled, baked, broiled)    Fish such as salmon, tuna, trout, tilapia, etc. (grilled, baked, broiled)    Tender cuts of lean (skinless) turkey or chicken    Lean deli meats: turkey, lean ham, chicken, lean roast beef    Beans such as kidney, garbanzo, black, [place], or low-fat/fat free refried beans    Peanut butter (natural preferred). Limit to 1 Tbsp. per day.    Low-fat meatloaf (made with lean ground beef or turkey)    Sloppy Joes made with low-sugar ketchup and lean ground beef or turkey    Soy or vegetable protein (i.e. vegan crumbles, soy/veggie burger, tofu)    Hummus    Vegetables:    Fresh: cooked or raw (as tolerated)    Frozen vegetables    Canned vegetables (low sodium or no salt added, rinse before cooking/eating)    (Ok to have skins/peels/membranes/seeds - just chew well)    Fruits:    Fresh fruit    Frozen fruit (no sugar added)    Canned fruit (packed in its own juice, NOT syrup)    (Ok to have skins/peels/membranes/seeds - just chew well)    Starch:    Unsweetened whole-grain hot cereal (or high fiber cold cereal, dry)    Toasted whole wheat bread or [place] Thins    Whole grain crackers    Baked /boiled/mashed potato/sweet potato    Cooked whole grain pasta, brown rice, or other cooked whole  grains    Starchy vegetables: corn, peas, winter squash    Protein Supplement:     Ready to drink protein shake with:  o 15-30 grams protein per serving  o Less than 10 grams total carbohydrate per serving     Protein powder mixed with:  o  Skim or 1% milk  o Low fat or fat free Lactaid milk, plain or no sugar added soymilk  o Water     Fats: (use in moderation)    1 teaspoon of soft tub margarine    1 teaspoon olive oil, canola oil, or peanut oil    1 tablespoon of low-fat deluca or salad dressing     Sample Menu for 18+ months after Gastric Bypass    You do NOT need to eat/drink the full portion sizes listed below  Always stop when you are satisfied    Breakfast   cup 1% cottage cheese     cup mixed berries   Lunch 2 oz lean roast beef on   [place] Thin with 1 tsp. light deluca    small tomato, chopped, mixed with 1 tsp. light vinaigrette dressing   Supplement Approved protein supplement (if needed between meals)   Dinner 2 oz grilled salmon    cup salad greens with 1 tsp. light salad dressing and 1 tsp. ground flax seed    cup quinoa or brown rice     Breakfast   cup egg substitute with   cup sautéed chopped vegetables  2 light [place] Krisp crackers   Lunch Tuna Melt:   cup tuna mixed with 1 tsp. light deluca over   [place] Thin. Top with 2-3 slices cucumber and 1 oz slice of low fat cheese   Supplement 1 cup skim milk (if needed between meals)   Dinner 3 oz  grilled, broiled, or baked seasoned skinless chicken breast    cup asparagus     Breakfast   cup plain oatmeal made with skim or 1% milk with 1 Tbsp. flavored/unflavored protein powder added  1 mozzarella string cheese   Lunch 2 oz deli turkey breast  1/3 cup salad with 1 tsp. light salad dressing, 1/8 of a whole avocado and 1 Tbsp. sunflower seeds   Dinner 3 oz. pork loin made in a crock pot, seasoned with a spice rub    cup cooked carrots   Supplement Approved protein supplement (if needed between meals)     Breakfast 1 cup breakfast casserole made with egg  substitute, turkey sausage,  and steamed, chopped bell peppers   Supplement  1 cup light Greek yogurt (if needed between meals)   Lunch 2 oz. teriyaki turkey    cup mashed sweet potato with 1-2 spritzes of spray butter (like [place])    cup fresh pineapple   Dinner 3 oz low fat meatloaf    cup roasted garlic zucchini     Breakfast   cup leftover breakfast casserole    cup no sugar added applesauce with 1 Tbsp. unflavored protein powder and a sprinkle of cinnamon    Lunch 3 oz shrimp with 1-2 Tbsp. low-sugar cocktail sauce for dipping    c. whole wheat pasta drizzled with   tsp. olive oil   Supplement 1 cup skim/1% milk with scoop of protein powder (if needed between meals)   Dinner Grilled, seasoned kebob with 2 oz lean beef and   cup vegetables     Breakfast Breakfast pizza:   [place] Thin spread with 1 Tbsp. low sugar spaghetti sauce,   cup shredded low fat cheese, melted and 1 slice of [place]     cup fresh fruit mixed with chopped almonds   Lunch   cup black bean soup  4-5 whole grain crackers   Dinner 3 oz  tilapia with lemon pepper seasoning    cup stewed tomatoes   Supplement 1 string cheese (if needed between meals)     Breakfast 2 hard boiled eggs (discard 1 egg yolk)    whole wheat English Muffin with 1 tsp. low sugar jelly   Lunch   cup leftover black bean soup topped with 1-2 Tbsp. low fat cheese  2-3 light [place] Krisp crackers   Supplement Approved protein supplement (if needed between meals)   Dinner 3 oz sirloin steak    cup steamed broccoli

## 2021-06-01 NOTE — PROGRESS NOTES
Bariatric Care Clinic Follow Up Visit for Previous Bariatric Surgery   Date of visit: 9/25/2019  Physician: Angie Glasgow MD  Primary Care is Lu Carrillo MD.  Halina Ibrahim   53 y.o.  female    Date of Surgery: 9/14/2004  Initial Weight: 296 pounds  Today's Weight:   Wt Readings from Last 1 Encounters:   09/25/19 (!) 328 lb 11.2 oz (149.1 kg)     Body mass index is 53.05 kg/m .  Weight: (!) 328 lb 11.2 oz (149.1 kg)       Assessment and Plan   Assessment: Halina is a 53 y.o. year old female who is 15 years s/p  Sabrina en Y Gastric Bypass with Dr. Macario.  Unfortunately, she is 32 pounds higher than her weight prior to surgery. Overall compliance with the [facility]ric [facility] Program has been fair.    Halina Ibrahim feels that she has not achieved the goal(s) identified pre-operatively.      Plan:    1. Postsurgical malabsorption  Patient is taking all vitamins as directed she actually is requiring 20,000 international units of vitamin D3 daily and recent vitamin D level was on the low range of normal.  She continues to have an elevated PTH.  She sees an endocrinologist and mentioned this to her and to him, in his note he did not seem too concerned about it.  I will send him a note from my [place] clinic tomorrow to make sure there is no other testing that we should be doing.  She did have a recent bone density scan which was normal    2. Morbid obesity (H)  We discussed healthy habits to assist with further weight loss.  Her habits have gotten off track.  She is drinking soda and snacking.  We discussed medication to assist with cravings.  Topamax was prescribed.  Risks, benefits and possible side effects were discussed and patient had her questions answered.  She will do a follow-up visit with our dietitian.  She will follow-up with me in 2 months.          >25 min spent with patient, >50 % spent in counseling and coordination of care     Bariatric Surgery Review   Interim History/LifeChanges: No  significant recent.  She is struggling with restless legs.    Patient Concerns: Elevated PTH    Hunger 1-10: 2-3    GERD no    Medication changes: Not discussed    Vitamin Intake:   Multivitamin   2 x per day, unsure if contains iron, additional iron   Vitamin D  20,000 IU every day   Calcium  citrate 2 x per day   Vit. B-12    super B complex twice a day     Habits:            Alcohol Intake  none   NSAID Use  no   Caffeine Use  yes- soda 20 oz coke 0,10- 20 oz pepsi every day she works   Exercise  walks to and from car   CPAP Use:  no   Birth Control  menopause   Tobacco Use     no        Meals: B: 2 hard boiled eggs and 1 cup of Kashi go lean or bagel L: cheese, turkey and crackers D: Sánchez Tejinder Turkey bowl with extra cheese, orders in, pizza, chicken  Snacks: turkey jerkey, yogurt, chips before bed                            LABS: reviewed      LABS:  No results found for: WBC, HGB, HCT, MCV, PLT   No results found for: [id number] Lab Results   Component Value Date    HGBA1C 5.6 09/06/2019      No results found for: CHOL No results found for: PTH      No results found for: FERRITIN   No results found for: HDL   No results found for: [id number] No results found for: 57305   No results found for: LDLCALC No results found for: TSH No results found for: FOLATE   No results found for: TRIG No results found for: ALT, AST, GGT, ALKPHOS, BILITOT No results found for: TESTOSTERONE     No components found for: CHOLHDL No results found for: 7597   @[facility](vitamin a: 1)@             Patient Profile   Social History     Patient does not qualify to have social determinant information on file (likely too young).   Social History Narrative     Not on file        Past Medical History   Past Medical History:   Diagnosis Date     Aortic valve replaced      Degenerative disc disease      Dyslipidemia      Hashimoto's disease      History of Sabrina-en-Y gastric bypass 9/14/2004    Dr. Macario Highest weight 296#     Hypertension       Low back pain      Metabolic syndrome      Mitral valve replaced      Morbid obesity (H)      Morbid obesity with BMI of 40.0-44.9, adult (H)      Other and unspecified postsurgical nonabsorption      Stroke (H)     from mass near mitral valve     Patient Active Problem List   Diagnosis     Other and unspecified postsurgical nonabsorption     Hypothyroidism     Mitral valve replaced     Aortic valve replaced     Morbid obesity with BMI of 40.0-44.9, adult (H)     Stroke (H)     History of Sabrina-en-Y gastric bypass     Metabolic syndrome     Low back pain     Degenerative disc disease     Current Outpatient Medications   Medication Sig Note     biotin 2,500 mcg cap Take 2 capsules by mouth daily.      CALCIUM CITRATE/VITAMIN D3 (CITRACAL REGULAR ORAL) Take 1 tablet by mouth 2 (two) times a day.      cholecalciferol, vitamin D3, (VITAMIN D3) 5,000 unit Tab Take 1 tablet by mouth daily.      clindamycin (CLEOCIN) 150 MG capsule Take 600 mg by mouth as needed.      gabapentin (NEURONTIN) 300 MG capsule Take 300 mg by mouth 2 (two) times a day. 9/26/2018: Received from: External Pharmacy     L.acidophilus-Bif. animalis (PROBIOTIC) 5 billion cell CpSP Take 1 capsule by mouth daily.      levothyroxine (SYNTHROID, LEVOTHROID) 125 MCG tablet Take 1 tablet by mouth daily. 9/17/2015: Received from: External Pharmacy Received Sig:      losartan (COZAAR) 50 MG tablet Take 1 tablet by mouth daily. 9/17/2015: Received from: External Pharmacy Received Sig:      MULTIVITAMIN ORAL Take 1 tablet by mouth daily.      MULTIVITS W-IRON,HEMATINIC (SUPER B-COMPLEX ORAL) Take 1 capsule by mouth daily.      OXYCONTIN 20 mg 12 hr tablet Take 1 tablet by mouth daily. 9/17/2015: Received from: External Pharmacy Received Sig:      tretinoin, emollient, 0.05 % Crea Apply 1 application topically daily.      warfarin (COUMADIN) 2.5 MG tablet Take 1 tablet by mouth daily. 9/17/2015: Received from: External Pharmacy Received Sig:      citalopram  "(CELEXA) 10 MG tablet Take 10 mg by mouth daily. 9/26/2018: Received from: External Pharmacy     dapsone (ACZONE) 5 % topical gel Apply 1 application topically 2 (two) times a day.      rOPINIRole (REQUIP) 0.25 MG tablet Take 2 tablets by mouth at bedtime. May take 1 tablet during the day if needed      VITAMIN D2 50,000 unit capsule Take 1 capsule (50,000 Units total) by mouth 3 (three) times a week.        Past Surgical History  She has a past surgical history that includes Tubal ligation; uterine ablation; Sabrina-en-y procedure; Aortic valve replacement; and Mitral valve replacement.     Examination   /80   Pulse (!) 59   Resp 16   Ht 5' 6\" (1.676 m)   Wt (!) 328 lb 11.2 oz (149.1 kg)   SpO2 92%   BMI 53.05 kg/m    Height: 5' 6\" (1.676 m) (9/25/2019  3:11 PM)  Weight: (!) 328 lb 11.2 oz (149.1 kg) (9/25/2019  3:11 PM)  BMI (Calculated): 53.1 (9/25/2019  3:11 PM)  SpO2: 92 % (9/25/2019  3:11 PM)    General:  Alert and ambulatory in no acute distress  Pscyh/Mood: stable         Counseling:   We reviewed the important post op bariatric recommendations:  -eating 3 meals daily  -eating protein first, getting >60gm protein daily  -eating slowly, chewing food well  -avoiding/limiting calorie containing beverages  -drinking water 15-30 minutes before or after meals  -choosing wheat, not white with breads, crackers, pastas, madelyn, bagels, tortillas, rice  -limiting restaurant or cafeteria eating to twice a week or less    We discussed the importance of restorative sleep and stress management in maintaining a healthy weight.  We discussed the National Weight Control Registry healthy weight maintenance strategies and ways to optimize metabolism.  We discussed the importance of physical activity including cardiovascular and strength training in maintaining a healthier weight.  We discussed the importance of life-long vitamin supplementation and life-long  follow-up.    Halina was reminded that, to avoid marginal " ulcers she should avoid tobacco at all, alcohol in excess, caffeine in excess, and NSAIDS (unless indicated for cardioprotection or othewise and opposed by a PPI).    DURAN Glasgow MD  [facility].  9/25/2019  3:18 PM      Much or all of the text in this note was generated through the use of Dragon Dictate voice-to-text software. Errors in spelling or words which seem out of context are unintentional. Sound alike errors, in particular, may have escaped editing.        No images are attached to the encounter.

## 2021-06-02 VITALS — WEIGHT: 293 LBS | HEIGHT: 66 IN | BODY MASS INDEX: 47.09 KG/M2

## 2021-06-02 NOTE — PROGRESS NOTES
"Post-op Surgical Weight Loss Diet Evaluation     Assessment:  Pt presents for 15 year post-op RD visit, s/p RNY in 2004 with Dr. Macario. Today we reviewed current eating habits and level of physical activity, and instructed on the changes that are required for successful bariatric outcomes.    Patient Progress: Pt presents irritable at visit, reporting the last RD visit didn't help. Pt in denial stage of making behavior change, with anger as a barrier at visit. Pt's diet needs to get back on track to compliment bariatric surgery.      Pt's Initial Weight: 294 lbs  Weight: (!) 321 lb 3.2 oz (145.7 kg)  Weight loss from initial: -27.2  % Weight loss: -9.25 %    Body mass index is 51.84 kg/m .     Concerns: Resistant to nutrition therapy.      Vitamins   Multi Vit with Iron: yes  Calcium Citrate: yes  B12: yes  D3: yes    Do you experience hunger? No   Do you have \"dumping\" syndrome? Pt uncertain d/t recent gall bladder removal.    How often?n/a   With what foods: n/a  Do you experience any reflux or discomfort with eating? n/a  Nausea: no  Vomiting: no  Diarrhea: no  Constipation: no  Hair loss: not discussed     Diet Recall/Time:   Breakfast: kashi cereal w/ 1% milk, 2 eggs (20g)   Am Snack: candy bar, turkey jerky   Lunch: pudding, turkey, cheese, 16 crackers (20g)   Pm snack:yogurt   Dinner: 3 mozzarella sticks (20g)   HS Snack: in bed- chips, 3 gluten free cookies, candy.     Estimated protein intake: 60 grams    Estimated portion size per meals:2-3 cup/meal    Meals per week away from home: rarely   Recommended limiting eating out to no more than 2x/week.    Fluid Intake  Water: 64 oz   Carbonation: 16 oz coke zero, 16 oz pepsi     Exercise  Nothing routine established.       PES statement:    Not ready for diet/lifestyle change (NB 1.3) related to unsupported beliefs/attitudes about food, nutrition and nutrition-related topics as evidenced by patient's subjective statements, denial of need to change, BMI " "51.  Intervention    Discussion  1. Encouraged pt using motivational interviewing techniques.  2. Addressed night time eating with ways to decrease.   3. Discussed making small goals on a monthly basis to effect larger weight loss over time.     Goals set by patient:  1. At night, cut out \"sugary stuff\" (can keep chips +cookies)       Monitor/Evaluation    Pt to follow up in 1 month with RD for weight management.       Time In: 3:15pm  Time Out: 3:45pm      ABN signed: Yes    " indwelling Richardson catheter since 03/2018

## 2021-06-03 VITALS — HEIGHT: 66 IN | WEIGHT: 293 LBS | BODY MASS INDEX: 47.09 KG/M2

## 2021-06-03 VITALS — BODY MASS INDEX: 47.09 KG/M2 | WEIGHT: 293 LBS | HEIGHT: 66 IN

## 2021-06-03 VITALS
HEART RATE: 59 BPM | SYSTOLIC BLOOD PRESSURE: 128 MMHG | RESPIRATION RATE: 16 BRPM | HEIGHT: 66 IN | DIASTOLIC BLOOD PRESSURE: 80 MMHG | OXYGEN SATURATION: 92 % | BODY MASS INDEX: 47.09 KG/M2 | WEIGHT: 293 LBS

## 2021-06-03 NOTE — PROGRESS NOTES
Medical  Weight Loss Follow-Up Diet Evaluation  Assessment:  Halina is presenting today for a follow up weight management nutrition consultation. Pt has had an initial appointment with Dr. Glasgow.   Pt's Initial Weight: 294 lbs  Weight: (!) 314 lb 14.4 oz (142.8 kg)  Weight loss from initial: -20.9  % Weight loss: -7.11 %    BMI: Body mass index is 50.83 kg/m .  IBW: 130 lb    Estimated RMR ([place]-St Jeor equation): 2045 kcals   Recommended Protein Intake: 60-80 grams of protein/day  Patient Active Problem List:     Patient Active Problem List   Diagnosis     Other and unspecified postsurgical nonabsorption     Hypothyroidism     Mitral valve replaced     Aortic valve replaced     Morbid obesity with BMI of 40.0-44.9, adult (H)     Stroke (H)     History of Sabrina-en-Y gastric bypass     Metabolic syndrome     Low back pain     Degenerative disc disease   Pt is 15 years s/p RNY bariatric surgery.   Diabetes: No     Progress on goals from last visit: Pt reports taking away night time sweets, but then falling off track once halloween hit. Pt continues to be resistant to making any dietary changes and reports multiple foods aversions and intolerances t/o visit.     Dietary Recall:  Breakfast: kashi cereal, milk, 2 eggs (10g)   Snack: candy bar   Lunch:turkey, cheese, crackers (20g)   Snack: yogurt   Dinner: judy nolan breakfast frozen meal + cheese  Snack: sweets, etc   Overnight eating: No  Eating out (frequency/week): not discussed.   Hydration (type/oz. per day):  Water: 64 oz   Caffeine:soda and diet soda   Exercise:  Routine exercise established: No     Nutrition Diagnosis:    Not ready for diet/lifestyle change (NB 1.3) related to unsupported beliefs/attitudes about food, nutrition and nutrition-related topics as evidenced by patient's subjective statements, denial of need to change, inability to meet goals previously set .     Intervention:  1. Discussed poor sleep schedule in relation to night time snacking;  encouraged turning off screens prior to bed time to prevent having sweets.   2. Nutrition education: Discussed benefits of cutting out candy bar at work and alternative options.   3. Nutrition counseling: encouraged pt using motivational interviewing techniques.     Monitoring/Evaluation:    Goals:  1. Cut out mid morning candy bar; have yogurt instead.     Follow up:  Pt will follow up in 2 month(s) with bariatrician and 1 month(s) with dietitian.     Time spent with patient: 15 minutes  Emy Johnson RD     ABN signed: Yes

## 2021-06-03 NOTE — PATIENT INSTRUCTIONS - HE

## 2021-06-03 NOTE — PROGRESS NOTES
[facility] Non Surgical Follow up Visit   Date of visit: 11/26/2019  Physician: Angie Glasgow MD  Primary Care is Lu Carrillo MD.  Halina Ibrahim   54 y.o.  female    Initial Weight: 296 pounds prior to Sabrina-en-Y gastric bypass in 2004    Today's Weight:   Wt Readings from Last 1 Encounters:   11/26/19 (!) 314 lb (142.4 kg)     Body mass index is 50.68 kg/m .  Initial Weight: 294 lbs  Weight: (!) 314 lb (142.4 kg)  Weight loss from initial: -20  % Weight loss: -6.8 %       Assessment and Plan   Assessment: Halina is a 54 y.o. year old female who presents for medical weight management.    1. Obesity  Patient was congratulated on her success thus far. Healthy habits to assist with further weight loss were discussed. She will continue to work on cutting back on sugary snacks. Written information was given.     2. Hypertension  Currently well controlled.         Follow up in 1 month with the dietician and in 3 months with myself           INTERIM HISTORY  Patient started the topamax and she feels it is helping. She is tolerating it. She has been extremely fatigued since her gallbladder surgery.     DIETARY HISTORY  Meals Per Day: 3  Eating Protein First?: sometimes  Food Diary: B:Kashi cereal and eggs or bagel and cream cheese L:meat and cheese trays, leftovers  D:S[employer] breakfast bowl with extra cheese or orders out, sometimes with vegetables (corn)  Snacks Per Day: varies  Typical Snack: turkey jerky, sugared pudding, greek yogurt  Fluid Intake: 64 oz plus  Portion Control: yes  Calorie Containing Beverages: Pepsi 4-20 oz per day  Choosing Whole Grains: sometimes  Meals at Restaurant per week:2    Positive Changes Since Last Visit: cutting back on sugared foods  Struggling With: bedtime eating    Knowledgeable in Reading Food Labels: not doing it      PHYSICAL ACTIVITY PATTERNS:  Cardiovascular: none  Strength Training: none    REVIEW OF SYSTEMS  GENERAL/CONSTITUTIONAL:  Fatigue:  yes  HEENT:  Vision changes, glaucoma: no  NEUROLOGIC:  Paresthesias: no  PSYCHIATRIC:  Moods: good         Patient Profile   Social History     Social History Narrative     Not on file        Past Medical History   Past Medical History:   Diagnosis Date     Aortic valve replaced      Degenerative disc disease      Dyslipidemia      Hashimoto's disease      History of Sabrina-en-Y gastric bypass 9/14/2004    Dr. Macario Highest weight 296#     Hypertension      Low back pain      Metabolic syndrome      Mitral valve replaced      Morbid obesity (H)      Morbid obesity with BMI of 40.0-44.9, adult (H)      Other and unspecified postsurgical nonabsorption      Stroke (H)     from mass near mitral valve     Patient Active Problem List   Diagnosis     Other and unspecified postsurgical nonabsorption     Hypothyroidism     Mitral valve replaced     Aortic valve replaced     Morbid obesity with BMI of 40.0-44.9, adult (H)     Stroke (H)     History of Sabrina-en-Y gastric bypass     Metabolic syndrome     Low back pain     Degenerative disc disease     Current Outpatient Medications   Medication Sig Note     biotin 2,500 mcg cap Take 2 capsules by mouth daily.      CALCIUM CITRATE/VITAMIN D3 (CITRACAL REGULAR ORAL) Take 1 tablet by mouth 2 (two) times a day.      cholecalciferol, vitamin D3, (VITAMIN D3) 5,000 unit Tab Take 1 tablet by mouth daily.      citalopram (CELEXA) 10 MG tablet Take 10 mg by mouth daily. 9/26/2018: Received from: External Pharmacy     dapsone (ACZONE) 5 % topical gel Apply 1 application topically 2 (two) times a day.      gabapentin (NEURONTIN) 300 MG capsule Take 300 mg by mouth 2 (two) times a day. 9/26/2018: Received from: External Pharmacy     L.acidophilus-Bif. animalis (PROBIOTIC) 5 billion cell CpSP Take 1 capsule by mouth daily.      levothyroxine (SYNTHROID, LEVOTHROID) 125 MCG tablet Take 1 tablet by mouth daily. 9/17/2015: Received from: External Pharmacy Received Sig:      losartan (COZAAR) 50  "MG tablet Take 1 tablet by mouth daily. 9/17/2015: Received from: External Pharmacy Received Sig:      MULTIVITAMIN ORAL Take 1 tablet by mouth daily.      MULTIVITS W-IRON,HEMATINIC (SUPER B-COMPLEX ORAL) Take 1 capsule by mouth daily.      rOPINIRole (REQUIP) 0.25 MG tablet Take 2 tablets by mouth at bedtime. May take 1 tablet during the day if needed      topiramate (TOPAMAX) 50 MG tablet 1/2 tab with dinner x 1 week then 1 tab with dinner every day      tretinoin, emollient, 0.05 % Crea Apply 1 application topically daily.      VITAMIN D2 50,000 unit capsule Take 1 capsule (50,000 Units total) by mouth 3 (three) times a week.      warfarin (COUMADIN) 2.5 MG tablet Take 1 tablet by mouth daily. 9/17/2015: Received from: External Pharmacy Received Sig:      clindamycin (CLEOCIN) 150 MG capsule Take 600 mg by mouth as needed.      OXYCONTIN 10 mg 12 hr tablet Take 10 mg by mouth every 12 (twelve) hours.        Past Surgical History  She has a past surgical history that includes Tubal ligation; uterine ablation; Sabrina-en-y procedure; Aortic valve replacement; and Mitral valve replacement.     Examination   /62 (Patient Site: Right Arm, Patient Position: Sitting, Cuff Size: Adult Large)   Pulse 83   Ht 5' 6\" (1.676 m)   Wt (!) 314 lb (142.4 kg)   SpO2 98%   BMI 50.68 kg/m    Height: 5' 6\" (1.676 m) (11/26/2019  2:48 PM)  Initial Weight: 294 lbs (11/26/2019  2:48 PM)  Weight: (!) 314 lb (142.4 kg) (11/26/2019  2:48 PM)  Weight loss from initial: -20 (11/26/2019  2:48 PM)  % Weight loss: -6.8 % (11/26/2019  2:48 PM)  BMI (Calculated): 50.7 (11/26/2019  2:48 PM)  SpO2: 98 % (11/26/2019  2:48 PM)    General:  Alert and ambulatory, NAD  HEENT: Moist mucous membranes, neck is without LAD  Pulmonary:  Normal respiratory effort, no cough, no audible wheezes/crackles.  CV:  Regular rate and Rhythm, no murmurs  Pscyh/Mood: stable         Counseling:   We reviewed the important post op bariatric " recommendations:  -eating 3 meals daily  -eating protein first, getting >60gm protein daily  -eating slowly, chewing food well  -avoiding/limiting calorie containing beverages  -limiting starchy vegetables and carbohydrates, choosing wheat, not white with breads,   crackers, pastas, madelyn, bagels, tortillas, rice  -limiting restaurant or cafeteria eating to twice a week or less    We discussed the importance of restorative sleep and stress management in maintaining a healthy weight.  We discussed the National Weight Control Registry healthy weight maintenance strategies and ways to optimize metabolism.  We discussed the importance of physical activity including cardiovascular and strength training in maintaining a healthier weight.    > 25 min spent with patient, > 50% spent in counseling         DURAN Glasgow MD  [facility].    Much or all of the text in this note was generated through the use of Dragon Dictate voice-to-text software. Errors in spelling or words which seem out of context are unintentional. Sound alike errors, in particular, may have escaped editing.

## 2021-06-04 VITALS — HEIGHT: 66 IN | BODY MASS INDEX: 47.09 KG/M2 | WEIGHT: 293 LBS

## 2021-06-04 VITALS — WEIGHT: 292.8 LBS | BODY MASS INDEX: 47.06 KG/M2 | HEIGHT: 66 IN

## 2021-06-04 VITALS
BODY MASS INDEX: 47.09 KG/M2 | HEIGHT: 66 IN | SYSTOLIC BLOOD PRESSURE: 136 MMHG | DIASTOLIC BLOOD PRESSURE: 62 MMHG | HEART RATE: 83 BPM | WEIGHT: 293 LBS | OXYGEN SATURATION: 98 %

## 2021-06-04 VITALS — BODY MASS INDEX: 47.02 KG/M2 | HEIGHT: 66 IN | WEIGHT: 292.6 LBS

## 2021-06-04 NOTE — PROGRESS NOTES
Medical  Weight Loss Follow-Up Diet Evaluation  Assessment:  Halina is presenting today for a follow up weight management nutrition consultation. Pt has had an initial appointment with Dr. Glasgow.  Pt's Initial Weight: 294 lbs  Weight: (!) 311 lb 9.6 oz (141.3 kg)  Weight loss from initial: -17.6  % Weight loss: -5.99 %    BMI: Body mass index is 50.29 kg/m .  IBW: 130 lbs    Estimated RMR ([place]-St Jeor equation): 2045 kcals   Recommended Protein Intake: 60-80 grams of protein/day  Patient Active Problem List:     Patient Active Problem List   Diagnosis     Other and unspecified postsurgical nonabsorption     Hypothyroidism     Mitral valve replaced     Aortic valve replaced     Morbid obesity with BMI of 40.0-44.9, adult (H)     Stroke (H)     History of Sabrina-en-Y gastric bypass     Metabolic syndrome     Low back pain     Degenerative disc disease     Diabetes: No     Progress on goals from last visit: Kept food journal, trying to be more mindful about water intake. Cut out mid morning candy bar- switched to greek yogut or none.     Dietary Recall:  Breakfast: bagel with cream cheese   Snack:none   Lunch: 15 pizza rolls   Snack: sunflower seeds  Dinner:meat balls, pasta, sweet corn (20g)  Snack: yogurt, 1/2 snickers bar, 6 ronda kisses  Overnight eating: No  Eating out (frequency/week): 2-3x/week  Hydration (type/oz. per day):  Water: varies   Caffeine: regular soda (at night), diet soda (at work)  Exercise:  Routine exercise established: No     Nutrition Diagnosis:    Not ready for diet/lifestyle change (NB 1.3) related to unsupported beliefs/attitudes about food, nutrition and nutrition-related topics as evidenced by patient's subjective statements, denial of need to change, BMI 50.   Intervention:  1. Nutrition education: Discussed benefits of reducing soda intake and increasing water.  2. Nutrition counseling: Encouraged pt using motivational interviewing techniques.      Monitoring/Evaluation:    Goals:  1. Reduce soda intake to M, W, F.     Follow up:  Pt will follow up in 2 month(s) with bariatrician and 1 month(s) with dietitian.     Time spent with patient: 30 minutes  Emy Johnson RD     ABN signed: Yes

## 2021-06-05 VITALS — HEIGHT: 66 IN | BODY MASS INDEX: 45.79 KG/M2 | WEIGHT: 284.9 LBS

## 2021-06-05 VITALS — BODY MASS INDEX: 46.93 KG/M2 | HEIGHT: 66 IN | WEIGHT: 292 LBS

## 2021-06-05 VITALS — WEIGHT: 279.5 LBS | BODY MASS INDEX: 44.92 KG/M2 | HEIGHT: 66 IN

## 2021-06-05 VITALS — WEIGHT: 279.4 LBS | BODY MASS INDEX: 44.9 KG/M2 | HEIGHT: 66 IN

## 2021-06-05 VITALS — BODY MASS INDEX: 45.77 KG/M2 | WEIGHT: 284.8 LBS | HEIGHT: 66 IN

## 2021-06-07 NOTE — PROGRESS NOTES
"Halina Ibrahim is a 54 y.o. female who is being evaluated via a billable telephone visit.      The patient has been notified of following:     \"This telephone visit will be conducted via a call between you and your physician/provider. We have found that certain health care needs can be provided without the need for a physical exam.  This service lets us provide the care you need with a short phone conversation.  If a prescription is necessary we can send it directly to your pharmacy.  If lab work is needed we can place an order for that and you can then stop by our lab to have the test done at a later time.    If during the course of the call the physician/provider feels a telephone visit is not appropriate, you will not be charged for this service.\"     Halina Ibrahim complains of    Chief Complaint   Patient presents with     Nutrition Counseling       I have reviewed and updated the patient's Past Medical History, Social History, Family History and Medication List.    ALLERGIES  Penicillins and Tramadol    Additional provider notes:     Medical  Weight Loss Follow-Up Diet Evaluation  Assessment:  Halina is presenting today for a follow up weight management nutrition consultation. Pt has had an initial appointment with Dr. Glasgow  Weight loss medication: topamax  Pt's Initial Weight: 294 lbs  Weight: (!) 311 lb 8 oz (141.3 kg) (no new weight)  Weight loss from initial: -17.5  % Weight loss: -5.95 %    BMI: Body mass index is 50.28 kg/m .  IBW: 130-140 lbs    Estimated RMR ([place]-St Jeor equation): 2045kcal   Recommended Protein Intake: 60-80 grams of protein/day  Patient Active Problem List:  Patient Active Problem List   Diagnosis     Postoperative malabsorption     Hypothyroidism     Mitral valve replaced     Aortic valve replaced     Morbid obesity with BMI of 40.0-44.9, adult (H)     Stroke (H)     History of Sabrina-en-Y gastric bypass     Metabolic syndrome     Low back pain     Degenerative disc disease     " Morbid obesity (H)     History of migraine headaches     Diabetes: No     Progress on goals from last visit:   1. Eliminate bedtime snack- goal somewhat met  Is trying to get up when working at home, she feels as though she is more productive at home. Is drinking at least one can of diet soda per day. Is trying to be better with increasing protein.     Dietary Recall:  Breakfast: cinnamon raisin bagel and cream cheese (9g)  Snack: none  Lunch: meat and cheese tray with triscuits (17g)  Snack: none  Dinner: judy nolan egg ham and cheese biscuit roll up (10g)  Snack: turkey jerkey and tortilla chips (20g)  Hydration (type/oz. per day):  Water: 64oz  Caffeine: regular soda 2-3 per day  Exercise:  Routine exercise established: No     Nutrition Diagnosis:    Overweight/Obesity (NC 3.3) related to overeating and poor lifestyle habits as evidenced by patient's report of falling into old habits such as evening snacking, large portions, soda intake, inactivity and BMI 50.28  Not ready for diet/lifestyle change (NB 1.3) related to unsupported beliefs/attitudes about food, nutrition and nutrition-related topics as evidenced by patient's subjective statements, inability to meet goals previously set    Intervention:  1. Food and/or nutrient delivery: encouraged increased protein at breakfast, discussed ways to increase this  2. Nutrition education: educated on lean protein sources  3. Nutrition counseling: motivational interviewing and goal setting    Monitoring/Evaluation:    Goals:  1. Eat at least 10g protein at breakfast  2. Continue working to eliminate bedtime snack    Assessment/Plan:    Patient to follow up in 6 weeks with bariatrician and 1 month(s) with RD    Phone call duration: 30 minutes    Lizzeth Shipman RD

## 2021-06-07 NOTE — PROGRESS NOTES
"Halina Ibrahim is a 54 y.o. female who is being evaluated via a billable telephone visit.      The patient has been notified of following:     \"This telephone visit will be conducted via a call between you and your physician/provider. We have found that certain health care needs can be provided without the need for a physical exam.  This service lets us provide the care you need with a short phone conversation.  If a prescription is necessary we can send it directly to your pharmacy.  If lab work is needed we can place an order for that and you can then stop by our lab to have the test done at a later time.    If during the course of the call the physician/provider feels a telephone visit is not appropriate, you will not be charged for this service.\"     Halina Ibrahim complains of    Chief Complaint   Patient presents with     Nutrition Counseling       I have reviewed and updated the patient's Past Medical History, Social History, Family History and Medication List.    ALLERGIES  Penicillins and Tramadol    Additional provider notes:     Medical  Weight Loss Follow-Up Diet Evaluation  Assessment:  Halina is presenting today for a follow up weight management nutrition consultation. Pt has had an initial appointment with Dr. Glasgow  Weight loss medication: topamax.  Pt's Initial Weight: 294 lbs  Weight: (!) 311 lb 8 oz (141.3 kg)  Weight loss from initial: -17.5  % Weight loss: -5.95 %    BMI: Body mass index is 50.28 kg/m .  IBW: 130-140 lbs    Estimated RMR ([place]-St Jeor equation): 2045kcal   Recommended Protein Intake: 60-80 grams of protein/day  Patient Active Problem List:  Patient Active Problem List   Diagnosis     Postoperative malabsorption     Hypothyroidism     Mitral valve replaced     Aortic valve replaced     Morbid obesity with BMI of 40.0-44.9, adult (H)     Stroke (H)     History of Sabrina-en-Y gastric bypass     Metabolic syndrome     Low back pain     Degenerative disc disease     Morbid obesity " (H)     History of migraine headaches     Diabetes: No    Progress on goals from last visit: patient states life has been busy lately and has fallen back into old habits, and is struggling with being at home  Hx of RYGB 9-14-04    Dietary Recall:  Breakfast: bagel with cream cheese  Snack: none  Lunch: spaghetti hotdish (hamburger, mushrooms, black olives, tomato sauce, pasta)  Snack: none  Dinner: chicken pasta salad  Snack:  None- if snacking- choosing sweets, candy  Hydration (type/oz. per day):  Water: 64oz  Caffeine:2-3 cans soda per day- regular    Alcohol : none  Exercise:  Routine exercise established: No  None intentional      Nutrition Diagnosis:  Overweight/Obesity (NC 3.3) related to overeating and poor lifestyle habits as evidenced by patient's report of falling into old habits such as evening snacking, large portions, soda intake, inactivity and BMI 50.28  Not ready for diet/lifestyle change (NB 1.3) related to unsupported beliefs/attitudes about food, nutrition and nutrition-related topics as evidenced by patient's subjective statements, inability to meet goals previously set      Intervention:    1. Nutrition counseling: motivational interviewing for continued success- encouraged patient to eliminate evening snacking as well as limit portion sizes to 1 cup per meal    Monitoring/Evaluation:    Goals:  1. Eliminate bedtime snack    Assessment/Plan:  1. Bariatric surgery status    2. Postoperative malabsorption    3. Morbid obesity with BMI of 50.0-59.9, adult (H)    4. Nutritional counseling      Patient to follow up in 3 months(s) with bariatrician and 1 month(s) with DAVID    Phone call duration: 20 minutes    Lizzeth Shipman RD

## 2021-06-07 NOTE — PROGRESS NOTES
"Halina Ibrahim is a 54 y.o. female who is being evaluated via a billable telephone visit.      The patient has been notified of following:     \"This telephone visit will be conducted via a call between you and your physician/provider. We have found that certain health care needs can be provided without the need for a physical exam.  This service lets us provide the care you need with a short phone conversation.  If a prescription is necessary we can send it directly to your pharmacy.  If lab work is needed we can place an order for that and you can then stop by our lab to have the test done at a later time.    If during the course of the call the physician/provider feels a telephone visit is not appropriate, you will not be charged for this service.\"     Halina Ibrahim complains of  No chief complaint on file.      I have reviewed and updated the patient's Past Medical History, Social History, Family History and Medication List.    ALLERGIES  Penicillins and Tramadol    Additional provider notes:         Halina Ibrahim is a 54 y.o. female who is being evaluated via a billable telephone visit.      Provider notes:    Halina Ibrahim presents for non surgical weight loss management. She had a RNY on 9/14/04. She has had weight regain and is being followed for nonsurgical weight management.    MEDICATIONS were reviewed and updated in the chart    ALLERGIES  Penicillins and Tramadol    Patient Profile   Social History     Social History Narrative     Not on file        Past Medical History   Past Medical History:   Diagnosis Date     Aortic valve replaced      Degenerative disc disease      Dyslipidemia      Hashimoto's disease      History of Sabrina-en-Y gastric bypass 9/14/2004    Dr. Macario Highest weight 296#     Hypertension      Low back pain      Metabolic syndrome      Mitral valve replaced      Morbid obesity (H)      Morbid obesity with BMI of 40.0-44.9, adult (H)      Other and unspecified postsurgical " nonabsorption      Stroke (H)     from mass near mitral valve     Patient Active Problem List   Diagnosis     Postoperative malabsorption     Hypothyroidism     Mitral valve replaced     Aortic valve replaced     Morbid obesity with BMI of 40.0-44.9, adult (H)     Stroke (H)     History of Sabrina-en-Y gastric bypass     Metabolic syndrome     Low back pain     Degenerative disc disease     Current Outpatient Medications   Medication Sig Note     biotin 2,500 mcg cap Take 2 capsules by mouth daily.      CALCIUM CITRATE/VITAMIN D3 (CITRACAL REGULAR ORAL) Take 1 tablet by mouth 2 (two) times a day.      cholecalciferol, vitamin D3, (VITAMIN D3) 5,000 unit Tab Take 1 tablet by mouth daily.      citalopram (CELEXA) 10 MG tablet Take 10 mg by mouth daily. 9/26/2018: Received from: External Pharmacy     dapsone (ACZONE) 5 % topical gel Apply 1 application topically 2 (two) times a day.      gabapentin (NEURONTIN) 300 MG capsule Take 300 mg by mouth 2 (two) times a day. 9/26/2018: Received from: External Pharmacy     L.acidophilus-Bif. animalis (PROBIOTIC) 5 billion cell CpSP Take 1 capsule by mouth daily.      levothyroxine (SYNTHROID, LEVOTHROID) 125 MCG tablet Take 1 tablet by mouth daily. 9/17/2015: Received from: External Pharmacy Received Sig:      losartan (COZAAR) 50 MG tablet Take 1 tablet by mouth daily. 9/17/2015: Received from: External Pharmacy Received Sig:      miscellaneous medical supply [facility] [place] CPAP machine for home use at pressure: 5-20 cms,  Nasal mask with cushion x 1, humidifier chamber x 1, humidity x 1      MULTIVITAMIN ORAL Take 1 tablet by mouth daily.      MULTIVITS W-IRON,HEMATINIC (SUPER B-COMPLEX ORAL) Take 1 capsule by mouth daily.      OXYCONTIN 10 mg 12 hr tablet Take 10 mg by mouth every 12 (twelve) hours.      rOPINIRole (REQUIP XL) 4 MG 24 hr tablet Take 4 mg by mouth as needed.      rOPINIRole (REQUIP) 0.25 MG tablet Take 2 tablets by mouth at bedtime. May take 1 tablet during the day if  needed      topiramate (TOPAMAX) 50 MG tablet 1 tab with dinner every day      tretinoin, emollient, 0.05 % Crea Apply 1 application topically daily.      warfarin (COUMADIN) 2.5 MG tablet Take 1 tablet by mouth daily. 9/17/2015: Received from: External Pharmacy Received Sig:      clindamycin (CLEOCIN) 150 MG capsule Take 600 mg by mouth as needed.        Past Surgical History  She has a past surgical history that includes Tubal ligation; uterine ablation; Sabrina-en-y procedure; Aortic valve replacement; and Mitral valve replacement.       INTERIM HISTORY  Patient states that she has slipped into new habits- especially snacking at night. She has been working from home for the past week. She sold her house. She is house hunting. It has been stressful. She is taking topamax 50 gm per day and is tolerating it- although she does have carpal tunnel so she gets intermittent tingling in her hands. It no longer makes her soda taste bad.     PATIENT REPORTED CURRENT WEIGHT   Unsure- she thinks she has gained weight    DIETARY HISTORY  MEALS: She is aware she is not getting enough protein, eating on the run  SNACKS: problematic, especially in the evenings  NUMBER OF MEALS/WEEK NOT PREPARED AT HOME: more than usual- especially on the weekends, gas station sandwiches  CALORIE CONTAINING BEVERAGES/WEEK: soda 2-3 cans per day    Positive Changes Since Last Visit: trying to make better choices when eating out  Struggling With: soda intake, snacking, fast food and take out    PHYSICAL ACTIVITY PATTERNS:  Cardiovascular: getting more steps with house hunting, has a recumbant bike- hopefully will set up in her new house  Strength Training: none    REVIEW OF SYSTEMS  GENERAL/CONSTITUTIONAL:  Fatigue: yes  HEENT:  Vision changes, glaucoma: no  CARDIOVASCULAR:  Chest Pain with Exertion: no  PULMONARY:  Dyspnea on exertion: no  NEUROLOGIC:  Paresthesias: yes  PSYCHIATRIC:  Moods: stressed         Patient Profile   Social History     Social  History Narrative     Not on file        Past Medical History   Past Medical History:   Diagnosis Date     Aortic valve replaced      Degenerative disc disease      Dyslipidemia      Hashimoto's disease      History of Sabrina-en-Y gastric bypass 9/14/2004    Dr. Macario Highest weight 296#     Hypertension      Low back pain      Metabolic syndrome      Mitral valve replaced      Morbid obesity (H)      Morbid obesity with BMI of 40.0-44.9, adult (H)      Other and unspecified postsurgical nonabsorption      Stroke (H)     from mass near mitral valve     Patient Active Problem List   Diagnosis     Postoperative malabsorption     Hypothyroidism     Mitral valve replaced     Aortic valve replaced     Morbid obesity with BMI of 40.0-44.9, adult (H)     Stroke (H)     History of Sabrina-en-Y gastric bypass     Metabolic syndrome     Low back pain     Degenerative disc disease     Current Outpatient Medications   Medication Sig Note     biotin 2,500 mcg cap Take 2 capsules by mouth daily.      CALCIUM CITRATE/VITAMIN D3 (CITRACAL REGULAR ORAL) Take 1 tablet by mouth 2 (two) times a day.      cholecalciferol, vitamin D3, (VITAMIN D3) 5,000 unit Tab Take 1 tablet by mouth daily.      citalopram (CELEXA) 10 MG tablet Take 10 mg by mouth daily. 9/26/2018: Received from: External Pharmacy     dapsone (ACZONE) 5 % topical gel Apply 1 application topically 2 (two) times a day.      gabapentin (NEURONTIN) 300 MG capsule Take 300 mg by mouth 2 (two) times a day. 9/26/2018: Received from: External Pharmacy     L.acidophilus-Bif. animalis (PROBIOTIC) 5 billion cell CpSP Take 1 capsule by mouth daily.      levothyroxine (SYNTHROID, LEVOTHROID) 125 MCG tablet Take 1 tablet by mouth daily. 9/17/2015: Received from: External Pharmacy Received Sig:      losartan (COZAAR) 50 MG tablet Take 1 tablet by mouth daily. 9/17/2015: Received from: External Pharmacy Received Sig:      miscellaneous medical supply [facility] [place] CPAP machine for home use at  pressure: 5-20 cms,  Nasal mask with cushion x 1, humidifier chamber x 1, humidity x 1      MULTIVITAMIN ORAL Take 1 tablet by mouth daily.      MULTIVITS W-IRON,HEMATINIC (SUPER B-COMPLEX ORAL) Take 1 capsule by mouth daily.      OXYCONTIN 10 mg 12 hr tablet Take 10 mg by mouth every 12 (twelve) hours.      rOPINIRole (REQUIP XL) 4 MG 24 hr tablet Take 4 mg by mouth as needed.      rOPINIRole (REQUIP) 0.25 MG tablet Take 2 tablets by mouth at bedtime. May take 1 tablet during the day if needed      topiramate (TOPAMAX) 50 MG tablet 1 tab with dinner every day      tretinoin, emollient, 0.05 % Crea Apply 1 application topically daily.      warfarin (COUMADIN) 2.5 MG tablet Take 1 tablet by mouth daily. 9/17/2015: Received from: External Pharmacy Received Sig:      clindamycin (CLEOCIN) 150 MG capsule Take 600 mg by mouth as needed.        Past Surgical History  She has a past surgical history that includes Tubal ligation; uterine ablation; Sabrina-en-y procedure; Aortic valve replacement; and Mitral valve replacement.         Counseling:   We reviewed the important healthy habits for weight loss:  -eating 3 meals daily  -eating protein first, getting >60gm protein daily  -eating slowly, chewing food well  -avoiding/limiting calorie containing beverages  -limiting starchy vegetables and carbohydrates, choosing wheat, not white with breads,   crackers, pastas, madelyn, bagels, tortillas, rice  -limiting restaurant or cafeteria eating to twice a week or less  -drinking adequate water    We discussed the importance of restorative sleep and stress management in maintaining a healthy weight.  We discussed the importance of physical activity including cardiovascular and strength training in maintaining a healthier weight.          Assessment/Plan:    Postoperative malabsorption  Pt is taking all vitamins. She actually takes 20,000 IU of vitamin D3 daily and despite this her levels were low normal. We will recheck vitamin D, PTH  and CMP. External orders printed and will be faxed to [facility].     Morbid obesity (H)  Patient has gotten off track. We discussed healthy habits to assist with weight loss. She will work on planning her meals ahead of time, decreasing snacking and stopping soda. We will increase her topamax to 1 tablet two times a day.    History of migraine headaches  Topamax may help prevent these.        I have reviewed the note as documented above.  This accurately captures the substance of my conversation with the patient.      Phone call contact time    Call Started at: 9:36 am  Call Ended at: 10:02 am      Signature:  DURAN Glasgow MD  [facility]

## 2021-06-08 NOTE — PROGRESS NOTES
"Halina Ibrahim is a 54 y.o. female who is being evaluated via a billable telephone visit.      The patient has been notified of following:     \"This telephone visit will be conducted via a call between you and your physician/provider. We have found that certain health care needs can be provided without the need for a physical exam.  This service lets us provide the care you need with a short phone conversation.  If a prescription is necessary we can send it directly to your pharmacy.  If lab work is needed we can place an order for that and you can then stop by our lab to have the test done at a later time.    If during the course of the call the physician/provider feels a telephone visit is not appropriate, you will not be charged for this service.\"     Halina Ibrahim complains of    Chief Complaint   Patient presents with     Nutrition Counseling       I have reviewed and updated the patient's Past Medical History, Social History, Family History and Medication List.    ALLERGIES  Penicillins and Tramadol    Additional provider notes:     Medical  Weight Loss Follow-Up Diet Evaluation  Assessment:  Halina is presenting today for a follow up weight management nutrition consultation. Pt has had an initial appointment with Dr. Glasgow  Weight loss medication: topamax.  Pt's Initial Weight: 294 lbs  Weight: (!) 311 lb 8 oz (141.3 kg)  Weight loss from initial: -17.5  % Weight loss: -5.95 %    BMI: Body mass index is 50.28 kg/m .  IBW: 130-140 lbs    Estimated RMR ([place]-St Jeor equation): 2045kcal   Recommended Protein Intake: 60-80 grams of protein/day  Patient Active Problem List:  Patient Active Problem List   Diagnosis     Postoperative malabsorption     Hypothyroidism     Mitral valve replaced     Aortic valve replaced     Morbid obesity with BMI of 40.0-44.9, adult (H)     Stroke (H)     History of Sabrina-en-Y gastric bypass     Metabolic syndrome     Low back pain     Degenerative disc disease     Morbid obesity " (H)     History of migraine headaches     Diabetes: No    Progress on goals from last visit: feels like clothes are fitting looser, eating less at bedtime, taking stairs more  1, 10g protein at breakfast- goal not met, is eating more protein during the day    Dietary Recall:  Breakfast: bagel with cream cheese  Snack: sunflower seeds- shelled- throughout the day  Lunch: macaroni and cheese with roasted meatballs- noodles and co.  Dinner: 2 balanced breaks  Hydration (type/oz. per day):  Water: 64oz  Caffeine: 2-3 regular soda per day  Exercise:  Routine exercise established: No     Nutrition Diagnosis:  Overweight/Obesity (NC 3.3) related to overeating and poor lifestyle habits as evidenced by patient's report of falling into old habits such as evening snacking, large portions, soda intake, inactivity and BMI 50.28  Not ready for diet/lifestyle change (NB 1.3) related to unsupported beliefs/attitudes about food, nutrition and nutrition-related topics as evidenced by patient's subjective statements, inability to meet goals previously set  Intervention:  1. Food and/or nutrient delivery: encouraged increased veggie intake, patient has very few veggies she will eat, so encouraged her to focus on the ones she does like. Discussed working on decreasing soda intake  2. Nutrition counseling: goal setting    Monitoring/Evaluation:    Goals:  1. Try to limit regular soda to 1-2 per day    Assessment/Plan    Patient to follow up in 2 weeks with bariatrician and 1 month(s) with RD    Phone call duration: 17 minutes    Lizzeth Shipman RD

## 2021-06-08 NOTE — TELEPHONE ENCOUNTER
Patient had some recent labs done through [facility] and are ready for review in Care Everywhere.  Cristal Galeana RN

## 2021-06-09 NOTE — PROGRESS NOTES
"Halina Ibrahim is a 54 y.o. female who is being evaluated via a billable telephone visit.      The patient has been notified of following:     \"This telephone visit will be conducted via a call between you and your physician/provider. We have found that certain health care needs can be provided without the need for a physical exam.  This service lets us provide the care you need with a short phone conversation.  If a prescription is necessary we can send it directly to your pharmacy.  If lab work is needed we can place an order for that and you can then stop by our lab to have the test done at a later time.    If during the course of the call the physician/provider feels a telephone visit is not appropriate, you will not be charged for this service.\"     Halina Ibrahim complains of    Chief Complaint   Patient presents with     Nutrition Counseling       I have reviewed and updated the patient's Past Medical History, Social History, Family History and Medication List.    ALLERGIES  Penicillins and Tramadol    Additional provider notes:     Medical  Weight Loss Follow-Up Diet Evaluation  Assessment:  Halina is presenting today for a follow up weight management nutrition consultation. Pt has had an initial appointment with Dr. Glasgow  Weight loss medication: topamax.  Pt's Initial Weight: 294 lbs  Weight: (!) 292 lb 12.8 oz (132.8 kg)  Weight loss from initial: 1.2  % Weight loss: 0.41 %    BMI: Body mass index is 47.26 kg/m .  IBW: 130-140 lbs    Estimated RMR ([place]-St Jeor equation): 2000kcal   Recommended Protein Intake: 60-80 grams of protein/day  Patient Active Problem List:  Patient Active Problem List   Diagnosis     Postoperative malabsorption     Hypothyroidism     Mitral valve replaced     Aortic valve replaced     Morbid obesity with BMI of 40.0-44.9, adult (H)     Stroke (H)     History of Sabrina-en-Y gastric bypass     Metabolic syndrome     Low back pain     Degenerative disc disease     Morbid obesity " (H)     History of migraine headaches     Bariatric surgery status     Diabetes: No     Progress on goals from last visit: has been cutting out bedtime snack, feels she is eating smaller portions. Has been more conscious of protein, but feels as though she is not getting enough. She is also working on reading labels for sugar.  Goals: decrease soda to 1-2 per day- goal not met      Dietary Recall:  Breakfast: bagel with cream cheese and an hour later gluten free oatmeal  Lunch: tuna packet with crackers  Dinner- sometimes an egg sandwich  Hydration (type/oz. per day):  Not discussed  Exercise:  Routine exercise established: No  Nutrition Diagnosis:    Overweight/Obesity (NC 3.3) related to overeating and poor lifestyle habits as evidenced by patient's report of falling into old habits such as evening snacking, large portions, soda intake, inactivity and BMI 47.26  Not ready for diet/lifestyle change (NB 1.3) related to unsupported beliefs/attitudes about food, nutrition and nutrition-related topics as evidenced by patient's subjective statements, inability to meet goals previously set  Intervention:  1. Food and/or nutrient delivery: discussed protein sources for breakfast, patient was encouraged to aim for 15g protein at breakfast  2. Nutrition counseling: motivational interviewing    Monitoring/Evaluation:    Goals:  1. 1-2 soda per day  2. 15g protein at breakfast    Assessment/Plan:    Patient to follow up in 2 months(s) with bariatrician and 1 month(s) with RD    Phone call duration: 24 minutes    Lizzeth Shipman RD

## 2021-06-09 NOTE — PROGRESS NOTES
"Halina Ibrahim is a 54 y.o. female who is being evaluated via a billable telephone visit.      The patient has been notified of following:     \"This telephone visit will be conducted via a call between you and your physician/provider. We have found that certain health care needs can be provided without the need for a physical exam.  This service lets us provide the care you need with a short phone conversation.  If a prescription is necessary we can send it directly to your pharmacy.  If lab work is needed we can place an order for that and you can then stop by our lab to have the test done at a later time.    Telephone visits are billed at different rates depending on your insurance coverage. During this emergency period, for some insurers they may be billed the same as an in-person visit.  Please reach out to your insurance provider with any questions.    If during the course of the call the physician/provider feels a telephone visit is not appropriate, you will not be charged for this service.\"    Patient has given verbal consent to a Telephone visit? Yes    What phone number would you like to be contacted at? [phone]     Patient would like to receive their AVS by AVS Preference: Paulo.          Provider notes:    Halina Ibrahim presents for non surgical weight loss management.    MEDICATIONS were reviewed and updated in the chart    ALLERGIES  Penicillins and Tramadol    Patient Profile   Social History     Social History Narrative     Not on file        Past Medical History   Past Medical History:   Diagnosis Date     Aortic valve replaced      Degenerative disc disease      Dyslipidemia      Hashimoto's disease      History of Sabrina-en-Y gastric bypass 9/14/2004    Dr. Macario Highest weight 296#     Hypertension      Low back pain      Metabolic syndrome      Mitral valve replaced      Morbid obesity (H)      Morbid obesity with BMI of 40.0-44.9, adult (H)      Other and unspecified postsurgical " nonabsorption      Stroke (H)     from mass near mitral valve     Patient Active Problem List   Diagnosis     Postoperative malabsorption     Hypothyroidism     Mitral valve replaced     Aortic valve replaced     Morbid obesity with BMI of 40.0-44.9, adult (H)     Stroke (H)     History of Sabrina-en-Y gastric bypass     Metabolic syndrome     Low back pain     Degenerative disc disease     Morbid obesity (H)     History of migraine headaches     Current Outpatient Medications   Medication Sig Note     biotin 2,500 mcg cap Take 2 capsules by mouth daily.      CALCIUM CITRATE/VITAMIN D3 (CITRACAL REGULAR ORAL) Take 1 tablet by mouth 2 (two) times a day.      cholecalciferol, vitamin D3, (VITAMIN D3) 5,000 unit Tab Take 4 tablets by mouth daily.       citalopram (CELEXA) 10 MG tablet Take 10 mg by mouth daily. 9/26/2018: Received from: External Pharmacy     clindamycin (CLEOCIN) 150 MG capsule Take 600 mg by mouth as needed.      dapsone (ACZONE) 5 % topical gel Apply 1 application topically 2 (two) times a day.      gabapentin (NEURONTIN) 300 MG capsule Take 300 mg by mouth 2 (two) times a day. 9/26/2018: Received from: External Pharmacy     L.acidophilus-Bif. animalis (PROBIOTIC) 5 billion cell CpSP Take 1 capsule by mouth daily.      levothyroxine (SYNTHROID, LEVOTHROID) 125 MCG tablet Take 1 tablet by mouth daily. 9/17/2015: Received from: External Pharmacy Received Sig:      losartan (COZAAR) 50 MG tablet Take 1 tablet by mouth daily. 9/17/2015: Received from: External Pharmacy Received Sig:      miscellaneous medical [place] [facility] [place] for [place] use at pressure: 5-20 cms,  Nasal mask with cushion x 1, humidifier chamber x 1, humidity x 1      MULTIVITAMIN ORAL Take 1 tablet by mouth daily.      MULTIVITS W-IRON,HEMATINIC (SUPER B-COMPLEX ORAL) Take 1 capsule by mouth daily.      OXYCONTIN 10 mg 12 hr tablet Take 10 mg by mouth every 12 (twelve) hours.      rOPINIRole (REQUIP XL) 4 MG 24 hr tablet Take 4 mg by  mouth as needed.      rOPINIRole (REQUIP) 0.25 MG tablet Take 2 tablets by mouth at bedtime. May take 1 tablet during the day if needed      topiramate (TOPAMAX) 50 MG tablet 1 tab twice a day      tretinoin, emollient, 0.05 % Crea Apply 1 application topically daily.      warfarin (COUMADIN) 2.5 MG tablet Take 1 tablet by mouth daily. 9/17/2015: Received from: External Pharmacy Received Sig:        Past Surgical History  She has a past surgical history that includes Tubal ligation; uterine ablation; Sabrina-en-y procedure; Aortic valve replacement; and Mitral valve replacement.       INTERIM HISTORY  Patient increased her topamax at her last visit. She feels that it has helped to curb her appetite and decrease cravings. She is tolerating it without side effects. She feels like she lost some weight by the way her clothes feels.    PATIENT REPORTED CURRENT WEIGHT   unsure    DIETARY HISTORY  MEALS: B: bagel and cream cheese L: Naturals meat and cheese plates (turkey or salami or ham) or turkey bologna sandwich or leftovers  D: meat and rarely vegetables (corn and avocado) and starch or meatballs and corn  SNACKS: decreased at bedtime  NUMBER OF MEALS/WEEK NOT PREPARED AT HOME: 2-4  CALORIE CONTAINING BEVERAGES/WEEK: 2-3 cans of soda per day    Positive Changes Since Last Visit: less snacking  Struggling With: soda intake, eating out, getting regular exercise, sleeping    PHYSICAL ACTIVITY PATTERNS:  Cardiovascular: ADLs (moved recently)  Strength Training: ADLs (moved recently)    REVIEW OF SYSTEMS  GENERAL/CONSTITUTIONAL:  Fatigue: yes- falling asleep before putting on her CPAP  HEENT:  Vision changes, glaucoma: no  CARDIOVASCULAR:  Chest Pain with Exertion: no  PULMONARY:  Dyspnea on exertion: yes  NEUROLOGIC:  Paresthesias: yes- has carpal tunnel  PSYCHIATRIC:  Moods: stable  MUSCULOSKELETAL/RHEUMATOLOGIC  Arthralgias: some hip pain when she wakes up  Myalgias: sometimes  ENDOCRINE:  Monitoring Blood Sugars:  na  Sugars Well Controlled: na       Patient Profile   Social History     Social History Narrative     Not on file        Past Medical History   Past Medical History:   Diagnosis Date     Aortic valve replaced      Degenerative disc disease      Dyslipidemia      Hashimoto's disease      History of Sabrina-en-Y gastric bypass 9/14/2004    Dr. Macario Highest weight 296#     Hypertension      Low back pain      Metabolic syndrome      Mitral valve replaced      Morbid obesity (H)      Morbid obesity with BMI of 40.0-44.9, adult (H)      Other and unspecified postsurgical nonabsorption      Stroke (H)     from mass near mitral valve     Patient Active Problem List   Diagnosis     Postoperative malabsorption     Hypothyroidism     Mitral valve replaced     Aortic valve replaced     Morbid obesity with BMI of 40.0-44.9, adult (H)     Stroke (H)     History of Sabrina-en-Y gastric bypass     Metabolic syndrome     Low back pain     Degenerative disc disease     Morbid obesity (H)     History of migraine headaches     Current Outpatient Medications   Medication Sig Note     biotin 2,500 mcg cap Take 2 capsules by mouth daily.      CALCIUM CITRATE/VITAMIN D3 (CITRACAL REGULAR ORAL) Take 1 tablet by mouth 2 (two) times a day.      cholecalciferol, vitamin D3, (VITAMIN D3) 5,000 unit Tab Take 4 tablets by mouth daily.       citalopram (CELEXA) 10 MG tablet Take 10 mg by mouth daily. 9/26/2018: Received from: External Pharmacy     clindamycin (CLEOCIN) 150 MG capsule Take 600 mg by mouth as needed.      dapsone (ACZONE) 5 % topical gel Apply 1 application topically 2 (two) times a day.      gabapentin (NEURONTIN) 300 MG capsule Take 300 mg by mouth 2 (two) times a day. 9/26/2018: Received from: External Pharmacy     L.acidophilus-Bif. animalis (PROBIOTIC) 5 billion cell CpSP Take 1 capsule by mouth daily.      levothyroxine (SYNTHROID, LEVOTHROID) 125 MCG tablet Take 1 tablet by mouth daily. 9/17/2015: Received from: External  Pharmacy Received Sig:      losartan (COZAAR) 50 MG tablet Take 1 tablet by mouth daily. 9/17/2015: Received from: External Pharmacy Received Sig:      miscellaneous medical supply [facility] [place] for home use at pressure: 5-20 cms,  Nasal mask with cushion x 1, humidifier chamber x 1, humidity x 1      MULTIVITAMIN ORAL Take 1 tablet by mouth daily.      MULTIVITS W-IRON,HEMATINIC (SUPER B-COMPLEX ORAL) Take 1 capsule by mouth daily.      OXYCONTIN 10 mg 12 hr tablet Take 10 mg by mouth every 12 (twelve) hours.      rOPINIRole (REQUIP XL) 4 MG 24 hr tablet Take 4 mg by mouth as needed.      rOPINIRole (REQUIP) 0.25 MG tablet Take 2 tablets by mouth at bedtime. May take 1 tablet during the day if needed      topiramate (TOPAMAX) 50 MG tablet 1 tab twice a day      tretinoin, emollient, 0.05 % Crea Apply 1 application topically daily.      warfarin (COUMADIN) 2.5 MG tablet Take 1 tablet by mouth daily. 9/17/2015: Received from: External Pharmacy Received Sig:        Past Surgical History  She has a past surgical history that includes Tubal ligation; uterine ablation; Sabrina-en-y procedure; Aortic valve replacement; and Mitral valve replacement.         Counseling:   We reviewed the important healthy habits for weight loss:  -eating 3 meals daily  -eating protein first, getting >60gm protein daily  -eating slowly, chewing food well  -avoiding/limiting calorie containing beverages  -limiting starchy vegetables and carbohydrates, choosing wheat, not white with breads,   crackers, pastas, madelyn, bagels, tortillas, rice  -limiting restaurant or cafeteria eating to twice a week or less  -drinking adequate water    We discussed the importance of restorative sleep and stress management in maintaining a healthy weight.  We discussed the importance of physical activity including cardiovascular and strength training in maintaining a healthier weight.          Assessment/Plan:    Morbid obesity (H)  Patient was congratulated on  the healthy changes she has made thus far. Healthy habits to assist with further weight loss were discussed. Written information was given. She will try to decrease her soda- her current goal is 2 cans per day.  She will try to increase her vegetable intake. She will continue to take the topamax.    Bariatric surgery status  Labs in May 2020 had improved. She is taking 73089 IUs of vitamin D and this has normalized her PTH. She will continue current dosing and we will recheck all routine labs in 2-3 months.        I have reviewed the note as documented above.  This accurately captures the substance of my conversation with the patient.      Phone call contact time    Call Started at: 2:33 pm  Call Ended at: 3:05 pm      Signature:  DURAN Glasgow MD  [facility]    Phone call duration: 32 minutes    Maame Maria [facility]

## 2021-06-09 NOTE — PATIENT INSTRUCTIONS - HE
"WEIGHT MAINTENANCE STRATEGIES    According to the National Weight Control Registry there are several things that people who have lost weight and kept it off have in common. Some of them are...    1. 3 MEALS A DAY:  Make sure you are eating 3 meals each day.  No skipping meals.  80% of people who skip meals are overweight or obese.  Missing meals slows the metabolism, making it harder to maintain a healthy weight.    2. FOOD DIARY:  Much like keeping a ledger for your checkbook, keeping a food and exercise diary helps you \"keep track\" of the balance of energy (calories) in and energy out. It also helps you recognize potential unhealthy deviations from healthy patterns before they become habit. It's a nice way to monitor whether you are getting the protein, fiber, and other nutrients that your body needs.    3. FOLLOW-UP:  Studies show that those who follow up with their health professional regularly maintained their weight loss and those who are \"lost to follow-up \"are at risk for regain.  Moreover, it is essential to monitor vitamin levels with laboratory studies for life following gastric bypass surgery.     4.  HIGH FIBER/LOW FAT:  Lean sources of protein (skim milk, skinless, baked or broiled chicken breast, fish, etc.)  will help you meet your protein needs while fruits, vegetables, and whole grains will help you get the fiber that your body needs.  This is heart healthy eating and helps to keep calorie levels in balance.    5.  8,000 STEPS PER DAY:  This is a \"weight maintenance dose. \"  It is essential to get your steps in every day, 7 days a week.  You don't have to \"work out \" 7 days a week, but throughout the day, getting 8000 steps will help you maintain the weight you have lost.  Parking far away, taking the stairs instead of the elevator, and pacing while on the phone are some ways to help achieve this goal.    6.  Eat at home 90% OF THE TIME:  People who maintain a healthy weight eat at home, or meal " "prepared at home, 90% of the time.  Studies show that people consume an average of 770 erik when eating out at a restaurant and 440 erik when eating a meal prepared at home.  This equates to almost 35 pounds of excess weight for a person who eats out once a day for 1 year.      OTHER HELPFUL HABITS    -Minimizing liquid calories.  Skim milk is okay.  -Avoiding \"mindless \"eating, i.e., eating at the TV, and the car, in front of the computer.                        OPTIMIZING YOUR METABOLISM FOR LIFE    1.  MUSCLE MAINTENANCE: Muscle burns calories up to 70% better than fat does.  As we age our body composition changes. We lose muscle mass.  Weight training can help us keep and even build muscle mass.  Dumbbells, pushups, rubber band training, weight machines are all examples of ways to keep and/or build muscle mass.    2.  MOVE: 8000 steps daily has been shown to be a weight maintenance dose.  Aim for 10,000 steps each day. Helpfull habits include taking the stairs instead of the elevator when possible, parking at the far end of the parking lot, pacing while on the phone, and taking the dog for a walk.  Of course using a treadmill, stair climber, elliptical , and bicycle are all ways of getting 10,000 steps.    3.  3 MEALS EACH DAY: Make sure to get your 3 meals each day.  Skipping breakfast, working through your lunch, or not eating dinner will lead to a slowing of your metabolism.  Studies show that 80% of people who skip meals are overweight or obese.    4.  ADEQUATE PROTEIN INTAKE: Getting adequate protein is beneficial for a number of reasons: to aid the healing process, to blunt cravings immediately after eating and for a period of time after eating, to help keep blood sugars level, and to help you maintain your muscle mass.  See #1 above.  "

## 2021-06-10 ENCOUNTER — OFFICE VISIT - HEALTHEAST (OUTPATIENT)
Dept: SURGERY | Facility: CLINIC | Age: 56
End: 2021-06-10

## 2021-06-10 DIAGNOSIS — E66.01 OBESITY, CLASS III, BMI 40-49.9 (MORBID OBESITY) (H): ICD-10-CM

## 2021-06-10 DIAGNOSIS — Z71.3 NUTRITIONAL COUNSELING: ICD-10-CM

## 2021-06-10 DIAGNOSIS — Z98.84 BARIATRIC SURGERY STATUS: ICD-10-CM

## 2021-06-10 ASSESSMENT — MIFFLIN-ST. JEOR: SCORE: 1869.12

## 2021-06-10 NOTE — PROGRESS NOTES
"Halina Ibrahim is a 54 y.o. female who is being evaluated via a billable telephone visit.      The patient has been notified of following:     \"This telephone visit will be conducted via a call between you and your physician/provider. We have found that certain health care needs can be provided without the need for a physical exam.  This service lets us provide the care you need with a short phone conversation.  If a prescription is necessary we can send it directly to your pharmacy.  If lab work is needed we can place an order for that and you can then stop by our lab to have the test done at a later time.    If during the course of the call the physician/provider feels a telephone visit is not appropriate, you will not be charged for this service.\"     Halina Ibrahim complains of    Chief Complaint   Patient presents with     Nutrition Counseling       I have reviewed and updated the patient's Past Medical History, Social History, Family History and Medication List.    ALLERGIES  Penicillins and Tramadol    Additional provider notes:     Medical  Weight Loss Follow-Up Diet Evaluation  Assessment:  Halina is presenting today for a follow up weight management nutrition consultation. Pt has had an initial appointment with Dr. Glasgow  Pt's Initial Weight: 294 lbs  Weight: (!) 292 lb 12.8 oz (132.8 kg)  Weight loss from initial: 1.2  % Weight loss: 0.41 %    BMI: Body mass index is 47.26 kg/m .  IBW: 130-140 lbs    Estimated RMR (Hull-St Jeor equation): 2000kcal   Recommended Protein Intake: 60-80 grams of protein/day  Patient Active Problem List:  Patient Active Problem List   Diagnosis     Postoperative malabsorption     Hypothyroidism     Mitral valve replaced     Aortic valve replaced     Morbid obesity with BMI of 40.0-44.9, adult (H)     Stroke (H)     History of Sabrina-en-Y gastric bypass     Metabolic syndrome     Low back pain     Degenerative disc disease     Morbid obesity (H)     History of migraine " headaches     Bariatric surgery status     Progress on goals from last visit:   1. 1-2 sodas per day- goal not met  2. 15g protein at breakfast- goal somewhat met- patient states this is going better  Continues to cut out bedtime snack, continues to read food labels for protein and sugar    Dietary Recall:  Breakfast: cottage cheese with pineapple, bagel and cream cheese  Lunch: BBG pulled chicken on a bun  Dinner:small gluten free cheese rolls, meat and cheese tray   +2 balanced breaks- cheese, nuts and dried fruit  Hydration (type/oz. per day):  Water: 64oz  Caffeine:2-3 per day  Exercise:  Routine exercise established: No  Moving around- working on the house     Nutrition Diagnosis:    Overweight/Obesity (NC 3.3) related to overeating and poor lifestyle habits as evidenced by patient's report of falling into old habits such as evening snacking, large portions, soda intake, inactivity and BMI 47.26  Not ready for diet/lifestyle change (NB 1.3) related to unsupported beliefs/attitudes about food, nutrition and nutrition-related topics as evidenced by patient's subjective statements, inability to meet goals previously set  Intervention:  1. Food and/or nutrient delivery: encouraged patient to continue to work on soda intake  2. Nutrition counseling: motivational interviewing    Monitoring/Evaluation:    Goals:  1. Switch to diet soda from regular    Assessment/Plan:    Patient to follow up in 1 months(s) with bariatrician and 2 month(s) with RD    Phone call duration: 15 minutes    Lizzeth Shipman RD

## 2021-06-11 NOTE — PROGRESS NOTES
[facility] Follow Up Visit for Previous Bariatric Surgery   Date of visit: 9/22/2020  Physician: Angie Glasgow MD  Primary Care is Lu Carrillo MD.  Halina Ibrahim   54 y.o.  female    Date of Surgery: 9/14/2004  Initial Weight: 296#  Today's Weight:   Wt Readings from Last 1 Encounters:   08/20/20 (!) 292 lb 12.8 oz (132.8 kg)     There is no height or weight on file to calculate BMI.  No data recorded     Assessment and Plan   Assessment: Halina is a 54 y.o. year old female who is 16 years s/p  Sabrina en Y Gastric Bypass with Dr. Macario.  They have had a durable weight loss of 4 lbs since surgery.  Overall compliance with the [facility] has been excellent recently.        Plan:    1. Postoperative malabsorption  Labs in May 2020 had improved. She is taking 48581 IUs of vitamin D and this has normalized her PTH. Her recent ferritin was elevated. She is taking a lot of iron supplementation,. She will have a TIBC drawn.    2. Morbid obesity  Patient was congratulated on her success thus far. Healthy habits to assist with further weight loss were discussed. Written information was given. She will try incorporating lifetime fitness exercise videos. She will see if her phone counts her steps. She will continue to take the topamax    She will follow up with me in 1 year and will continue monthly visits with our dietician    >25 min spent with patient, >50 % spent in counseling and coordination of care     Bariatric Surgery Review   Interim History/LifeChanges: Patient feels she is doing well with her weight loss thus far. She is tolerating the topamax without side effects and feels that it is helping to decrease her cravings and snacking.     Patient Concerns: not always getting adequate protein    Hunger 1-10: 2    GERD no    Medication changes: no recent    Vitamin Intake:   Multivitamin   2, each tab contains 8 mg iron, takes additional chelated iron 4 tabs per day    Vitamin D  20,000   Calcium  citrate   Vit. B-12    SL     Habits:            Alcohol Intake  none   NSAID Use  no   Caffeine Use  soda, 1 can pepsi per day, coke 0   Exercise  ADLs, moved recently   CPAP Use:  na   Birth Control  menopause   Tobacco Use     no                                      LABS: reviewed    DEXA: not discussed    LABS:  No results found for: WBC, HGB, HCT, MCV, PLT   No results found for: [id number] Lab Results   Component Value Date    HGBA1C 5.6 09/06/2019      No results found for: CHOL No results found for: PTH      No results found for: FERRITIN   No results found for: HDL   No results found for: [id number] No results found for: 86197   No results found for: LDLCALC No results found for: TSH No results found for: FOLATE   No results found for: TRIG No results found for: ALT, AST, GGT, ALKPHOS, BILITOT No results found for: TESTOSTERONE     No components found for: CHOLHDL No results found for: 7597   @resusfast(vitamin a: 1)@             Patient Profile   Social History     Social History Narrative     Not on file        Past Medical History   Past Medical History:   Diagnosis Date     Aortic valve replaced      Degenerative disc disease      Dyslipidemia      Hashimoto's disease      History of Sabrina-en-Y gastric bypass 9/14/2004    Dr. Macario Highest weight 296#     Hypertension      Low back pain      Metabolic syndrome      Mitral valve replaced      Morbid obesity (H)      Morbid obesity with BMI of 40.0-44.9, adult (H)      Other and unspecified postsurgical nonabsorption      Stroke (H)     from mass near mitral valve     Patient Active Problem List   Diagnosis     Postoperative malabsorption     Hypothyroidism     Mitral valve replaced     Aortic valve replaced     Morbid obesity with BMI of 40.0-44.9, adult (H)     Stroke (H)     History of Sabrina-en-Y gastric bypass     Metabolic syndrome     Low back pain     Degenerative disc disease     Morbid obesity (H)     History of  migraine headaches     Bariatric surgery status     Current Outpatient Medications   Medication Sig Note     biotin 2,500 mcg cap Take 2 capsules by mouth daily.      CALCIUM CITRATE/VITAMIN D3 (CITRACAL REGULAR ORAL) Take 1 tablet by mouth 2 (two) times a day.      cholecalciferol, vitamin D3, (VITAMIN D3) 5,000 unit Tab Take 4 tablets by mouth daily.       citalopram (CELEXA) 10 MG tablet Take 10 mg by mouth daily. 9/26/2018: Received from: External Pharmacy     clindamycin (CLEOCIN) 150 MG capsule Take 600 mg by mouth as needed.      dapsone (ACZONE) 5 % topical gel Apply 1 application topically 2 (two) times a day.      FERROUS SULFATE ORAL Take 37 mg by mouth 4 (four) times a day.      gabapentin (NEURONTIN) 300 MG capsule Take 300 mg by mouth 2 (two) times a day. 9/26/2018: Received from: External Pharmacy     L.acidophilus-Bif. animalis (PROBIOTIC) 5 billion cell CpSP Take 1 capsule by mouth daily.      levothyroxine (SYNTHROID, LEVOTHROID) 150 MCG tablet       losartan (COZAAR) 50 MG tablet Take 1 tablet by mouth daily. 9/17/2015: Received from: External Pharmacy Received Sig:      miscellaneous medical supply [facility] CPAP machine for home use at pressure: 5-20 cms,  Nasal mask with cushion x 1, humidifier chamber x 1, humidity x 1      MULTIVITAMIN ORAL Take 1 tablet by mouth daily.      MULTIVITS W-IRON,HEMATINIC (SUPER B-COMPLEX ORAL) Take 1 capsule by mouth daily.      OXYCONTIN 10 mg 12 hr tablet Take 10 mg by mouth every 12 (twelve) hours.      rOPINIRole (REQUIP XL) 4 MG 24 hr tablet Take 4 mg by mouth as needed.      rOPINIRole (REQUIP) 0.25 MG tablet Take 2 tablets by mouth at bedtime. May take 1 tablet during the day if needed      topiramate (TOPAMAX) 50 MG tablet TAKE 1 TABLET BY MOUTH TWICE A DAY.      tretinoin, emollient, 0.05 % Crea Apply 1 application topically daily.      warfarin (COUMADIN) 2.5 MG tablet Take 1 tablet by mouth daily. 9/17/2015: Received from: External Pharmacy Received Sig:   "      Past Surgical History  She has a past surgical history that includes Tubal ligation; uterine ablation; Sabrina-en-y procedure; Aortic valve replacement; and Mitral valve replacement.     Examination   There were no vitals taken for this visit.  Height: 5' 6\" (1.676 m) (8/20/2020  2:00 PM)  Initial Weight: 294 lbs (8/20/2020  2:00 PM)  Weight: (!) 292 lb 12.8 oz (132.8 kg) (8/20/2020  2:00 PM)  Weight loss from initial: 1.2 (8/20/2020  2:00 PM)  % Weight loss: 0.41 % (8/20/2020  2:00 PM)  BMI (Calculated): 47.3 (8/20/2020  2:00 PM)  SpO2: 98 % (11/26/2019  2:48 PM)    General:  Alert   Pscyh/Mood: sounds stable         Counseling:   We reviewed the important post op bariatric recommendations:  -eating 3 meals daily  -eating protein first, getting >60gm protein daily  -eating slowly, chewing food well  -avoiding/limiting calorie containing beverages  -drinking water 15-30 minutes before or after meals  -choosing wheat, not white with breads, crackers, pastas, madelyn, bagels, tortillas, rice  -limiting restaurant or cafeteria eating to twice a week or less    We discussed the importance of restorative sleep and stress management in maintaining a healthy weight.  We discussed the National Weight Control Registry healthy weight maintenance strategies and ways to optimize metabolism.  We discussed the importance of physical activity including cardiovascular and strength training in maintaining a healthier weight.  We discussed the importance of life-long vitamin supplementation and life-long  follow-up.    Halina was reminded that, to avoid marginal ulcers she should avoid tobacco at all, alcohol in excess, caffeine in excess, and NSAIDS (unless indicated for cardioprotection or othewise and opposed by a PPI).    DURAN Glasgow MD  [facility].  9/22/2020  1:31 PM      Much or all of the text in this note was generated through the use of Dragon Dictate voice-to-text software. Errors in spelling or " words which seem out of context are unintentional. Sound alike errors, in particular, may have escaped editing.        No images are attached to the encounter.

## 2021-06-11 NOTE — PROGRESS NOTES
"Halina Ibrahim is a 54 y.o. female who is being evaluated via a billable telephone visit.      The patient has been notified of following:     \"This telephone visit will be conducted via a call between you and your physician/provider. We have found that certain health care needs can be provided without the need for a physical exam.  This service lets us provide the care you need with a short phone conversation.  If a prescription is necessary we can send it directly to your pharmacy.  If lab work is needed we can place an order for that and you can then stop by our lab to have the test done at a later time.    Telephone visits are billed at different rates depending on your insurance coverage. During this emergency period, for some insurers they may be billed the same as an in-person visit.  Please reach out to your insurance provider with any questions.    If during the course of the call the physician/provider feels a telephone visit is not appropriate, you will not be charged for this service.\"    Patient has given verbal consent to a Telephone visit? Yes    What phone number would you like to be contacted at? [phone]    Patient would like to receive their AVS by AVS Preference: E-Mail (Inform patient AVS not encrypted with this option).      Phone call duration: 26 minutes    Loc Byrne CMA    "

## 2021-06-12 NOTE — PROGRESS NOTES
13 yrs post op lab orders placed for patient and sent to patient via mail in preparation for appointment with  in September.    Vivian Steiner RN, N  [facility]  P [phone]  F [phone]

## 2021-06-12 NOTE — PROGRESS NOTES
"Halina Ibrahim is a 55 y.o. female who is being evaluated via a billable telephone visit.      The patient has been notified of following:     \"This telephone visit will be conducted via a call between you and your physician/provider. We have found that certain health care needs can be provided without the need for a physical exam.  This service lets us provide the care you need with a short phone conversation.  If a prescription is necessary we can send it directly to your pharmacy.  If lab work is needed we can place an order for that and you can then stop by our lab to have the test done at a later time.    If during the course of the call the physician/provider feels a telephone visit is not appropriate, you will not be charged for this service.\"     Halina Ibrahim complains of    Chief Complaint   Patient presents with     Nutrition Counseling       I have reviewed and updated the patient's Past Medical History, Social History, Family History and Medication List.    ALLERGIES  Penicillins and Tramadol    Additional provider notes:     Medical  Weight Loss Follow-Up Diet Evaluation  Assessment:  Halina is presenting today for a follow up weight management nutrition consultation. Pt has had an initial appointment with Dr. Glasgow  Weight loss medication: topamax.  Pt's Initial Weight: 294 lbs  Weight: (!) 292 lb (132.5 kg) (no new weight)  Weight loss from initial: 2  % Weight loss: 0.68 %    BMI: Body mass index is 47.13 kg/m .  Ideal body weight: 59.3 kg (130 lb 11.7 oz)  Adjusted ideal body weight: 88.6 kg (195 lb 3.8 oz)     Estimated RMR (H[place]-St Jeor equation):   1944 kcals x 1.2 (sedentary) = 2333 kcals (for weight maintenance)  Recommended Protein Intake: 60-80 grams of protein/day  Patient Active Problem List:  Patient Active Problem List   Diagnosis     Postoperative malabsorption     Hypothyroidism     Mitral valve replaced     Aortic valve replaced     Morbid obesity with BMI of 40.0-44.9, adult " (H)     Stroke (H)     History of Sabrina-en-Y gastric bypass     Metabolic syndrome     Low back pain     Degenerative disc disease     Morbid obesity (H)     History of migraine headaches     Bariatric surgery status     Diabetes: No     Progress on goals from last visit: feels as though she needs to not focus on cutting things out, but rather, eating more protein- has noticed hair falling out more recently.  Patient is 16 year post op RYGB in 9-14-04 with Dr. Macario.  +has not been able to get a new weight recently- but does feel as though she is losing weight, feels that her clothes has been fitting looser.     Hydration (type/oz. per day):  Set goal to switch from regular to diet pop- somewhat met, states she hasn't been as good as she should have been- is doing 1 coke zero per day, 1-2 regular pops  Exercise:  Not discussed    Nutrition Diagnosis:    Overweight/Obesity (NC 3.3) related to overeating and poor lifestyle habits as evidenced by patient's report of falling into old habits such as evening snacking, large portions, soda intake, inactivity and BMI 47.26  Not ready for diet/lifestyle change (NB 1.3) related to unsupported beliefs/attitudes about food, nutrition and nutrition-related topics as evidenced by patient's subjective statements, inability to meet goals previously set  Intervention:  1. Food and/or nutrient delivery: discussed ways to get adequate protein, encouraged patient to count high quality protein rather than small amounts in foods such as bagels and oatmeal  2. Nutrition counseling: goal setting and motivational interviewing    Monitoring/Evaluation:    Goals:  1. 60-80g protein per day    Assessment/Plan:    Patient to follow up in 1 month with DAVID    Phone call duration: 21 minutes    Lizzeth Shipman RD

## 2021-06-13 NOTE — PROGRESS NOTES
"Halina Ibrahim is a 55 y.o. female who is being evaluated via a billable telephone visit.      The patient has been notified of following:     \"This telephone visit will be conducted via a call between you and your physician/provider. We have found that certain health care needs can be provided without the need for a physical exam.  This service lets us provide the care you need with a short phone conversation.  If a prescription is necessary we can send it directly to your pharmacy.  If lab work is needed we can place an order for that and you can then stop by our lab to have the test done at a later time.    Telephone visits are billed at different rates depending on your insurance coverage. During this emergency period, for some insurers they may be billed the same as an in-person visit.  Please reach out to your insurance provider with any questions.    If during the course of the call the physician/provider feels a telephone visit is not appropriate, you will not be charged for this service.\"    Patient has given verbal consent to a Telephone visit? Yes    What phone number would you like to be contacted at? [phone]    Patient would like to receive their AVS by AVS Preference: Paulo.      Phone call duration: 17 minutes    Lizzeth Shipman RD        Medical  Weight Loss Follow-Up Diet Evaluation  Assessment:  Halina is presenting today for a follow up weight management nutrition consultation. Pt has had an initial appointment with Dr. Glasgow  Weight loss medication: topamax- states this has been working and is the biggest help with her weight loss  Pt's Initial Weight: 294 lbs  Weight: (!) 284 lb 14.4 oz (129.2 kg)  Weight loss from initial: 9.1  % Weight loss: 3.1 %    BMI: Body mass index is 45.98 kg/m .  Ideal body weight: 59.3 kg (130 lb 11.7 oz)  Adjusted ideal body weight: 87.3 kg (192 lb 6.4 oz)    Estimated RMR (Blair-St Jeor equation):   1904 kcals   Recommended Protein Intake: 60-80 grams of " protein/day  Patient Active Problem List:  Patient Active Problem List   Diagnosis     Postoperative malabsorption     Hypothyroidism     Mitral valve replaced     Aortic valve replaced     Morbid obesity with BMI of 40.0-44.9, adult (H)     Stroke (H)     History of Sabrina-en-Y gastric bypass     Metabolic syndrome     Low back pain     Degenerative disc disease     Morbid obesity (H)     History of migraine headaches     Bariatric surgery status     Progress on goals from last visit: has not been tracking protein like she should, is trying to eat more protein foods such as yogurt, meats and cheeses. INR was off the charts so she has been focused on increasing greens, per patient.   Patient is down 26lb in 6 months.     Beverages: still drinking 1 can diet pop per day (coke zero), not drinking much sugary beverages  Exercise: fell on the ice a few weeks ago and states her hip is sore, limited exercise  Nutrition Diagnosis:    Overweight/Obesity (NC 3.3) related to overeating and poor lifestyle habits as evidenced by patient's report of falling into old habits such as evening snacking, large portions, soda intake, inactivity and BMI 47.26  Not ready for diet/lifestyle change (NB 1.3) related to unsupported beliefs/attitudes about food, nutrition and nutrition-related topics as evidenced by patient's subjective statements, inability to meet goals previously set  Intervention:  Food and/or nutrient delivery: continued to encourage increase protein intake, aiming for 60-80g per day  Nutrition counseling: encouragement for continued success    Monitoring/Evaluation:    Goals:  1. Continue with 60g protein per day    Patient to follow up in 1 month(s) with DAVID

## 2021-06-13 NOTE — PROGRESS NOTES
[facility] Follow Up Visit for Previous Bariatric Surgery   Date of visit: 9/27/2017  Physician: Angie Glasgow MD  Primary Care is Jeremias Garibay MD.  Halnia Ibrahim   51 y.o.  female    Date of Surgery: 9/14/2004  Initial Weight: 296 pounds  Today's Weight:   Wt Readings from Last 1 Encounters:   09/27/17 (!) 274 lb (124.3 kg)     Body mass index is 44.22 kg/(m^2).  Initial Weight: 296 lbs  Weight: 274 lb (124.3 kg)  Weight loss from initial: 22  % Weight loss: 7.43 %     Assessment and Plan   Assessment: Halina is a 51 y.o. year old female who is 13 years s/p  Sabrina en Y Gastric Bypass with Dr. Macario.  They have had a durable weight loss of 24 lbs since surgery.  Overall compliance with the [facility] has been good.      Plan:    1. Postoperative malabsorption  Patient is taking an excessive amount of vitamin D (1500 IU per day) yet her recent labs showed a low normal D and slightly elevated PTH.  She does not miss doses.  She is wondering if this has anything to do with her Hashimoto's.  For now I will have her continue her current vitamin regimen.  I will do some research to see if I can figure out why she needs such excessive doses of vitamin D to remain in the normal range    2. Morbid obesity  Patient has been drinking sugared soda and her healthy habits have declined.  She is not getting any regular exercise.  She does struggle with back pain.  She states the biggest reason for not exercising as she is just lazy.  She also has decreased energy.  We discussed healthy habits to help with weight loss.          >25 min spent with patient, >50 % spent in counseling and coordination of care     Bariatric Surgery Review   Interim History/LifeChanges: Patient has been very busy with work.  She will be going in November to spend 6 weeks in the [facility].  We did not do a TSH with her recent labs.  She sees an endocrinologist for her Hashimoto's and states that her last  levels were normal and they were done approximately 6 months ago.    Patient Concerns: Lab results, fatigue    Medication changes: None    Vitamin Intake:   Multivitamin   2 per day plus 4 iron tabs   Vitamin D  15,000 q day   Calcium  2 per day   Vit. B-12    super b complex     Habits:            Alcohol Intake  none   NSAID Use  none   Caffeine Use  diet soda 10-20 oz 5 days per week, pepsi 10-20 oz   Exercise  walks 10 minutes at work per day, takes the steps when she can   CPAP Use:  na   Birth Control  menopause   Tobacco Use     no                                      LABS: ordered      LABS:  No results found for: WBC, HGB, HCT, MCV, PLT   No results found for: PPUAESLS14PS No results found for: HGBA1C   No results found for: CHOL No results found for: PTH      No results found for: FERRITIN   No results found for: HDL   No results found for: SOGEYOAS32 No results found for: 67116   No results found for: LDLCALC No results found for: TSH No results found for: FOLATE   No results found for: TRIG No results found for: ALT, AST, GGT, ALKPHOS, BILITOT No results found for: TESTOSTERONE     No components found for: CHOLHDL No results found for: 7597   @[employer](vitamin a: 1)@             Patient Profile   Social History     Social History Narrative        Past Medical History   Past Medical History:   Diagnosis Date     Aortic valve replaced      Degenerative disc disease      Dyslipidemia      Hashimoto's disease      History of Sabrina-en-Y gastric bypass 9/14/2004    Dr. Macario Highest weight 296#     Hypertension      Low back pain      Metabolic syndrome      Mitral valve replaced      Morbid obesity      Morbid obesity with BMI of 40.0-44.9, adult      Other and unspecified postsurgical nonabsorption      Stroke     from mass near mitral valve     Patient Active Problem List   Diagnosis     Other and unspecified postsurgical nonabsorption     Hypothyroidism     Mitral valve replaced     Aortic valve replaced  "    Morbid obesity with BMI of 40.0-44.9, adult     Stroke     History of Sabrina-en-Y gastric bypass     Metabolic syndrome     Low back pain     Degenerative disc disease     Current Outpatient Prescriptions   Medication Sig Note     biotin 2,500 mcg cap Take 2 capsules by mouth daily.      CALCIUM CITRATE/VITAMIN D3 (CITRACAL REGULAR ORAL) Take 1 tablet by mouth 2 (two) times a day.      CHELATED ZINC ORAL Take 27 mg by mouth daily.      cholecalciferol, vitamin D3, (VITAMIN D3) 5,000 unit Tab Take 1 tablet by mouth daily.      clindamycin (CLEOCIN) 150 MG capsule Take 600 mg by mouth as needed.      dapsone (ACZONE) 5 % topical gel Apply 1 application topically 2 (two) times a day.      L.acidophilus-Bif. animalis (PROBIOTIC) 5 billion cell CpSP Take 1 capsule by mouth daily.      levothyroxine (SYNTHROID, LEVOTHROID) 125 MCG tablet Take 1 tablet by mouth daily. 9/17/2015: Received from: External Pharmacy Received Sig:      losartan (COZAAR) 50 MG tablet Take 1 tablet by mouth daily. 9/17/2015: Received from: External Pharmacy Received Sig:      MULTIVITAMIN ORAL Take 1 tablet by mouth daily.      MULTIVITS W-IRON,HEMATINIC (SUPER B-COMPLEX ORAL) Take 1 capsule by mouth daily.      OXYCONTIN 20 mg 12 hr tablet Take 1 tablet by mouth daily. 9/17/2015: Received from: External Pharmacy Received Sig:      THYROID, BULK, MISC Use 2 capsules As Directed daily.      tretinoin, emollient, 0.05 % Crea Apply 1 application topically daily.      warfarin (COUMADIN) 2.5 MG tablet Take 1 tablet by mouth daily. 9/17/2015: Received from: External Pharmacy Received Sig:        Past Surgical History  She has a past surgical history that includes Tubal ligation; uterine ablation; Sabrina-en-y procedure; Aortic valve replacement; and Mitral valve replacement.     Examination   /84  Pulse 61  Resp 18  Ht 5' 6\" (1.676 m)  Wt (!) 274 lb (124.3 kg)  SpO2 100%  BMI 44.22 kg/m2  Height: 5' 6\" (1.676 m) (9/27/2017  3:22 PM)  Initial " Weight: 296 lbs (9/27/2017  3:22 PM)  Weight: 274 lb (124.3 kg) (9/27/2017  3:22 PM)  Weight loss from initial: 22 (9/27/2017  3:22 PM)  % Weight loss: 7.43 % (9/27/2017  3:22 PM)  BMI (Calculated): 44.2 (9/27/2017  3:22 PM)  SpO2: 100 % (9/27/2017  3:22 PM)  General:  Alert and ambulatory,   HEENT:  No conjunctival pallor, moist mucous Membranes, neck is without LAD  Pulmonary:  Normal respiratory effort, no cough, no audible wheezes/crackles.  CV:  Regular rate and Rhythm, no murmurs  Abdominal: Scars well healed, BS normal,soft, NT without rebound or guarding  Extremities:  No edema  Skin:  No rashes   Pscyh/Mood: stable         Counseling:   We reviewed the important post op bariatric recommendations:  -eating 3 meals daily  -eating protein first, getting >60gm protein daily  -eating slowly, chewing food well  -avoiding/limiting calorie containing beverages  -drinking water 15-30 minutes before or after meals  -choosing wheat, not white with breads, crackers, pastas, madelyn, bagels, tortillas, rice  -limiting restaurant or cafeteria eating to twice a week or less    We discussed the importance of restorative sleep and stress management in maintaining a healthy weight.  We discussed the National Weight Control Registry healthy weight maintenance strategies and ways to optimize metabolism.  We discussed the importance of physical activity including cardiovascular and strength training in maintaining a healthier weight.  We discussed the importance of life-long vitamin supplementation and life-long  follow-up.    Halina was reminded that, to avoid marginal ulcers she should avoid tobacco at all, alcohol in excess, caffeine in excess, and NSAIDS (unless indicated for cardioprotection or othewise and opposed by a PPI).    DURAN Glasgow MD  [facility].  9/27/2017  3:49 PM

## 2021-06-14 NOTE — PROGRESS NOTES
"Halina Ibrahim is a 55 y.o. female who is being evaluated via a billable telephone visit.      The patient has been notified of following:     \"This telephone visit will be conducted via a call between you and your physician/provider. We have found that certain health care needs can be provided without the need for a physical exam.  This service lets us provide the care you need with a short phone conversation.  If a prescription is necessary we can send it directly to your pharmacy.  If lab work is needed we can place an order for that and you can then stop by our lab to have the test done at a later time.    Telephone visits are billed at different rates depending on your insurance coverage. During this emergency period, for some insurers they may be billed the same as an in-person visit.  Please reach out to your insurance provider with any questions.    If during the course of the call the physician/provider feels a telephone visit is not appropriate, you will not be charged for this service.\"    Patient has given verbal consent to a Telephone visit? Yes    What phone number would you like to be contacted at? [phone]    Patient would like to receive their AVS by AVS Preference: Paulo.    Phone call duration: 15 minutes    Lizzeth Shipman RD          Medical  Weight Loss Follow-Up Diet Evaluation  Assessment:  Halina is presenting today for a follow up weight management nutrition consultation. Pt has had an initial appointment with Dr. Glasgow  Weight loss medication: topamax.  Pt's Initial Weight: 294 lbs  Weight: (!) 284 lb 12.8 oz (129.2 kg)  Weight loss from initial: 9.2  % Weight loss: 3.13 %    BMI: Body mass index is 45.97 kg/m .  Ideal body weight: 59.3 kg (130 lb 11.7 oz)  Adjusted ideal body weight: 87.3 kg (192 lb 5.7 oz)    Estimated RMR (Yancey-St Jeor equation):   1904 kcals  Recommended Protein Intake: 60-80 grams of protein/day  Patient Active Problem List:  Patient Active Problem List "   Diagnosis     Postoperative malabsorption     Hypothyroidism     Mitral valve replaced     Aortic valve replaced     Morbid obesity with BMI of 40.0-44.9, adult (H)     Stroke (H)     History of Sabrina-en-Y gastric bypass     Metabolic syndrome     Low back pain     Degenerative disc disease     Morbid obesity (H)     History of migraine headaches     Bariatric surgery status     Progress on goals from last visit: is tracking protein and finds that helpful, states she is doing better than she realized, getting close to 60g per day.  Has been drinking less diet pop, drinking water all the time.    Dietary Recall:  Breakfast: bagel  Lunch: meat lasagna  Dinner: meat and cheese plate  Typical snacks: sunflower seeds, little bit of turkey jerkey   +doesn't like vegetables  Exercise: getting up and moving throughout the day  Nutrition Diagnosis:    Overweight/Obesity (NC 3.3) related to overeating and poor lifestyle habits as evidenced by patient's report of falling into old habits such as evening snacking, large portions, soda intake, inactivity and BMI 47.26  Not ready for diet/lifestyle change (NB 1.3) related to unsupported beliefs/attitudes about food, nutrition and nutrition-related topics as evidenced by patient's subjective statements, inability to meet goals previously set  Intervention:  1. Food and/or nutrient delivery: discussed the importance of getting up and moving regularly  2. Nutrition counseling: support for continued success    Monitoring/Evaluation:    Goals:  1. Track protein- aim for 60g per day  2. Increase movement- getting up more frequently during the day    Patient to follow up in 1 month

## 2021-06-15 ENCOUNTER — AMBULATORY - HEALTHEAST (OUTPATIENT)
Dept: SURGERY | Facility: CLINIC | Age: 56
End: 2021-06-15

## 2021-06-15 DIAGNOSIS — Z86.69 HISTORY OF MIGRAINE HEADACHES: ICD-10-CM

## 2021-06-15 DIAGNOSIS — E66.01 MORBID OBESITY (H): ICD-10-CM

## 2021-06-15 NOTE — PROGRESS NOTES
"Hailna Ibrahim is a 55 y.o. female who is being evaluated via a billable telephone visit.      The patient has been notified of following:     \"This telephone visit will be conducted via a call between you and your physician/provider. We have found that certain health care needs can be provided without the need for a physical exam.  This service lets us provide the care you need with a short phone conversation.  If a prescription is necessary we can send it directly to your pharmacy.  If lab work is needed we can place an order for that and you can then stop by our lab to have the test done at a later time.    Telephone visits are billed at different rates depending on your insurance coverage. During this emergency period, for some insurers they may be billed the same as an in-person visit.  Please reach out to your insurance provider with any questions.    If during the course of the call the physician/provider feels a telephone visit is not appropriate, you will not be charged for this service.\"    Patient has given verbal consent to a Telephone visit? Yes    What phone number would you like to be contacted at? [phone]    Patient would like to receive their AVS by AVS Preference: Paulo.    Phone call duration: 25 minutes    Lizzeth Shipman RD          Medical  Weight Loss Follow-Up Diet Evaluation  Assessment:  Halina is presenting today for a follow up weight management nutrition consultation. Pt has had an initial appointment with Dr. Glasgow  Weight loss medication: topamax.  Pt's Initial Weight: 294 lbs  Weight: (!) 279 lb 6.4 oz (126.7 kg)  Weight loss from initial: 14.6  % Weight loss: 4.97 %    BMI: Body mass index is 45.1 kg/m .  Ideal body weight: 59.3 kg (130 lb 11.7 oz)  Adjusted ideal body weight: 86.3 kg (190 lb 3.2 oz)    Estimated RMR ([place]-St Jeor equation):   1883 kcals      Recommended Protein Intake: 60-80 grams of protein/day  Patient Active Problem List:  Patient Active Problem List "   Diagnosis     Postoperative malabsorption     Hypothyroidism     Mitral valve replaced     Aortic valve replaced     Morbid obesity with BMI of 40.0-44.9, adult (H)     Stroke (H)     History of Sabrina-en-Y gastric bypass     Metabolic syndrome     Low back pain     Degenerative disc disease     Morbid obesity (H)     History of migraine headaches     Bariatric surgery status     Progress on goals from last visit: tracking protein, consistent soda intake  1. Get up during the day to move- goal not met  2. Track protein, 60g per day- goal met  Careful of what she eats due to INR, not eating a whole lot of veggies- will eat if in foods I.e chicken pot pie  Typical snacks: sunflower seeds, turkey jerkey  Exercise: rare    Nutrition Diagnosis:    Overweight/Obesity (NC 3.3) related to overeating and poor lifestyle habits as evidenced by patient's report of falling into old habits such as evening snacking, large portions, soda intake, inactivity and BMI 45.1  Not ready for diet/lifestyle change (NB 1.3) related to unsupported beliefs/attitudes about food, nutrition and nutrition-related topics as evidenced by patient's subjective statements, inability to meet goals previously set  Intervention:    1. Nutrition counseling: motivational interviewing- contemplation stage    Monitoring/Evaluation:    Goals:  1. Get up and move during work day  2. Continue to track protein    Patient to follow up in 6 months(s) with bariatrician and 1 month(s) with DAVID

## 2021-06-15 NOTE — PROGRESS NOTES
"Halina Ibrahim is a 55 y.o. female who is being evaluated via a billable telephone visit.      The patient has been notified of following:     \"This telephone visit will be conducted via a call between you and your physician/provider. We have found that certain health care needs can be provided without the need for a physical exam.  This service lets us provide the care you need with a short phone conversation.  If a prescription is necessary we can send it directly to your pharmacy.  If lab work is needed we can place an order for that and you can then stop by our lab to have the test done at a later time.    Telephone visits are billed at different rates depending on your insurance coverage. During this emergency period, for some insurers they may be billed the same as an in-person visit.  Please reach out to your insurance provider with any questions.    If during the course of the call the physician/provider feels a telephone visit is not appropriate, you will not be charged for this service.\"    Patient has given verbal consent to a Telephone visit? Yes    What phone number would you like to be contacted at? [phone]    Patient would like to receive their AVS by AVS Preference: Paulo.      Phone call duration: 20 minutes    Lizzeth Shipman RD          Medical  Weight Loss Follow-Up Diet Evaluation  Assessment:  Halina is presenting today for a follow up weight management nutrition consultation. Pt has had an initial appointment with Dr. Glasgow- hx of RYGB  Weight loss medication: topamax.   Pt's Initial Weight: 294 lbs  Weight: (!) 279 lb 8 oz (126.8 kg)  Weight loss from initial: 14.5  % Weight loss: 4.93 %    BMI: Body mass index is 45.11 kg/m .  Ideal body weight: 59.3 kg (130 lb 11.7 oz)  Adjusted ideal body weight: 86.3 kg (190 lb 3.8 oz)    Estimated RMR ([place]-St Jeor equation):   1883 kcals    Recommended Protein Intake: 60-80 grams of protein/day  Patient Active Problem List:  Patient Active " Problem List   Diagnosis     Postoperative malabsorption     Hypothyroidism     Mitral valve replaced     Aortic valve replaced     Morbid obesity with BMI of 40.0-44.9, adult (H)     Stroke (H)     History of Sabrina-en-Y gastric bypass     Metabolic syndrome     Low back pain     Degenerative disc disease     Morbid obesity (H)     History of migraine headaches     Bariatric surgery status     Progress on goals from last visit: feels as though she has been eating more sugar than she had been   Protein is still good. Movement during the day has improved.   +has not been taking regular MVI- taking women's one a day- would like to do research on a vitamin that meets her needs  Beverages: water intake has been good, still consistently drinking soda during the day     Nutrition Diagnosis:    Overweight/Obesity (NC 3.3) related to overeating and poor lifestyle habits as evidenced by patient's report of falling into old habits such as evening snacking, large portions, soda intake, inactivity and BMI 45.1  Not ready for diet/lifestyle change (NB 1.3) related to unsupported beliefs/attitudes about food, nutrition and nutrition-related topics as evidenced by patient's subjective statements, inability to meet goals previously set      Intervention:  1. Food and/or nutrient delivery: encouraged patient to decrease soda intake and to continue to work on increasing movement    Monitoring/Evaluation:    Goals:  1. 2 soda per day  2. Continue with getting protein     Patient to follow up in 1 month(s) with DAVID

## 2021-06-16 NOTE — TELEPHONE ENCOUNTER
Telephone Encounter by Vivian Steiner, RN at 4/15/2019  3:10 PM     Author: Vivian Steiner, RN Service: -- Author Type: Registered Nurse    Filed: 4/15/2019  3:13 PM Encounter Date: 4/11/2019 Status: Signed    : Vivian Steiner, RN (Registered Nurse)       Angie Glasgow MD Swenson, Amy B, RN   Caller: Unspecified (4 days ago,  3:46 PM)             Reviewed. I recommend she stay on the 50,000 IU of vitamin D. Script was faxed in.

## 2021-06-17 NOTE — PROGRESS NOTES
Pt sent a message reporting her current weight and it was updated in her chart.    Vivian Steiner RN, CBN  [facility]  P [phone]  F [phone]

## 2021-06-19 NOTE — PROGRESS NOTES
14 year post-op RNY  lab orders placed for patient and will be sent to the patient's home  in preparation for appointment with Julia Glasgow MD on 9/26/2018.    Cristal Galeana RN, N  [facility]

## 2021-06-20 NOTE — PROGRESS NOTES
[facility] Follow Up Visit for Previous Bariatric Surgery   Date of visit: 9/26/2018  Physician: Angie Glasgow MD  Primary Care is Jeremias Garibay MD.  Halina Ibrahim   52 y.o.  female    Date of Surgery: 9/14/2004  Initial Weight: 296 pounds  Today's Weight:   Wt Readings from Last 1 Encounters:   09/26/18 (!) 294 lb (133.4 kg)     Body mass index is 47.45 kg/(m^2).  Weight: 294 lb (133.4 kg)     Assessment and Plan   Assessment: Halina is a 52 y.o. year old female who is 14 years s/p  Sabrina en Y Gastric Bypass with Dr. Macario.  They have had a durable weight loss of 2 lbs since surgery.  Overall compliance with the [facility] has been fairly good.        Plan:    1. Postoperative malabsorption  Routine post operative labs were reviewed with the patient. She is taking non chelated iron 5 pills per day yet has a low normal ferritin. She did have a GI bleed last year in the [facility] requiring transfusion. She is taking calcium  And 2 multivitamins with iron. Her PTH was elevated with a low normal D. At the time of her lab draw she was taking 1500 IUs of vitamin D3 every day for the past year.     This was discussed with Dr. Lopez. At this point I will call her to set up a bone density scan. She will take vitamin 50,000 IU 3 x per week. I will have her separate her iron from her calcium as this may be interfering with calcium absorption. We will recheck labs in 3 months.    2. Morbid obesity (H)  Patient has had some bad habits creep back in. I recommended she stop all soda. She will do a follow up visit with our dietician Sotero. I ordered a PT eval for a generalized conditioning program.          >60min spent with patient, >50 % spent in counseling and coordination of care     Bariatric Surgery Review   Interim History/LifeChanges: Pt has been struggling with plantar fasciitis.     Patient Concerns: abnormal labs    Hunger 1-10: not discussed    GERD no    Medication  changes: see list    Vitamin Intake:   Multivitamin   2 with iron every day   Vitamin D  now on 50,000 IU 3 x per week   Calcium  citrate bid   Vit. B-12    SL     Habits:            Alcohol Intake  no   NSAID Use  no   Caffeine Use  not discussed   Exercise  She is walking 1/4 mile (from her car and to her car at work)   CPAP Use:  na   Birth Control  menopause   Tobacco Use     no       MBSAQIP  Surgeon: SHANNAN Macario MD  Anticoag for PE/DVT since last visit: No  Patient complains of hernia: No  Readmit since last visit: No  Reoperation since last visit: No  Vomiting: Not at all  GERD req medication: No  Sleep Apnea: No  Hypertension req meds: No  Hyperlipidemia req meds: No  Diabetes: No    Symptoms  Hair Loss: No  Reactive Hypoglycemia: No  Abdominal Pain: No  Nausea: No  Heartburn: No  Constipation: No  Diarrhea: No  Trouble Breathing or Chest Pain: No  Leg Swelling: No  Skin rashes under folds: No                         LABS:reviewed    LABS:  No results found for: WBC, HGB, HCT, MCV, PLT   No results found for: [id number] No results found for: HGBA1C   No results found for: CHOL No results found for: PTH      No results found for: FERRITIN   No results found for: HDL   No results found for: [id number] No results found for: 14754   No results found for: LDLCALC No results found for: TSH No results found for: FOLATE   No results found for: TRIG No results found for: ALT, AST, GGT, ALKPHOS, BILITOT No results found for: TESTOSTERONE     No components found for: CHOLHDL No results found for: 7597   @resusfast(vitamin a: 1)@             Patient Profile   Social History     Social History Narrative        Past Medical History   Past Medical History:   Diagnosis Date     Aortic valve replaced      Degenerative disc disease      Dyslipidemia      Hashimoto's disease      History of Sabrina-en-Y gastric bypass 9/14/2004    Dr. Macario Highest weight 296#     Hypertension      Low back pain      Metabolic syndrome      Mitral  valve replaced      Morbid obesity (H)      Morbid obesity with BMI of 40.0-44.9, adult (H)      Other and unspecified postsurgical nonabsorption      Stroke (H)     from mass near mitral valve     Patient Active Problem List   Diagnosis     Other and unspecified postsurgical nonabsorption     Hypothyroidism     Mitral valve replaced     Aortic valve replaced     Morbid obesity with BMI of 40.0-44.9, adult (H)     Stroke (H)     History of Sabrina-en-Y gastric bypass     Metabolic syndrome     Low back pain     Degenerative disc disease     Current Outpatient Prescriptions   Medication Sig Note     biotin 2,500 mcg cap Take 2 capsules by mouth daily.      CALCIUM CITRATE/VITAMIN D3 (CITRACAL REGULAR ORAL) Take 1 tablet by mouth 2 (two) times a day.      cholecalciferol, vitamin D3, (VITAMIN D3) 5,000 unit Tab Take 1 tablet by mouth daily.      citalopram (CELEXA) 10 MG tablet Take 10 mg by mouth daily. 9/26/2018: Received from: External Pharmacy     clindamycin (CLEOCIN) 150 MG capsule Take 600 mg by mouth as needed.      dapsone (ACZONE) 5 % topical gel Apply 1 application topically 2 (two) times a day.      ergocalciferol (VITAMIN D2) 50,000 unit capsule Take 1 capsule (50,000 Units total) by mouth 3 (three) times a week.      gabapentin (NEURONTIN) 300 MG capsule Take 300 mg by mouth 2 (two) times a day. 9/26/2018: Received from: External Pharmacy     L.acidophilus-Bif. animalis (PROBIOTIC) 5 billion cell CpSP Take 1 capsule by mouth daily.      levothyroxine (SYNTHROID, LEVOTHROID) 125 MCG tablet Take 1 tablet by mouth daily. 9/17/2015: Received from: External Pharmacy Received Sig:      losartan (COZAAR) 50 MG tablet Take 1 tablet by mouth daily. 9/17/2015: Received from: External Pharmacy Received Sig:      MULTIVITAMIN ORAL Take 1 tablet by mouth daily.      MULTIVITS W-IRON,HEMATINIC (SUPER B-COMPLEX ORAL) Take 1 capsule by mouth daily.      OXYCONTIN 20 mg 12 hr tablet Take 1 tablet by mouth daily. 9/17/2015:  "Received from: External Pharmacy Received Sig:      tretinoin, emollient, 0.05 % Crea Apply 1 application topically daily.      warfarin (COUMADIN) 2.5 MG tablet Take 1 tablet by mouth daily. 9/17/2015: Received from: External Pharmacy Received Sig:        Past Surgical History  She has a past surgical history that includes Tubal ligation; uterine ablation; Sabrina-en-y procedure; Aortic valve replacement; and Mitral valve replacement.     Examination   /84 (Patient Site: Right Arm, Patient Position: Sitting, Cuff Size: Adult Large)  Pulse 61  Ht 5' 6\" (1.676 m)  Wt (!) 294 lb (133.4 kg)  SpO2 97%  BMI 47.45 kg/m2  Height: 5' 6\" (1.676 m) (9/26/2018  3:20 PM)  Initial Weight: 296 lbs (9/27/2017  3:22 PM)  Weight: 294 lb (133.4 kg) (9/26/2018  3:20 PM)  Weight loss from initial: 22 (9/27/2017  3:22 PM)  % Weight loss: 7.43 % (9/27/2017  3:22 PM)  BMI (Calculated): 47.5 (9/26/2018  3:20 PM)  SpO2: 97 % (9/26/2018  3:20 PM)  General:  Alert and ambulatory,   HEENT:  No conjunctival pallor, moist mucous Membranes, neck is without LAD  Pulmonary:  Normal respiratory effort, no cough, no audible wheezes/crackles.  CV:  Regular rate and Rhythm, loud artifical valve click  Abdominal: Scars well healed, BS normal,soft, NT without rebound or guarding  Extremities: no edema  Skin:  No rashes  Pscyh/Mood: stable         Counseling:   We reviewed the important post op bariatric recommendations:  -eating 3 meals daily  -eating protein first, getting >60gm protein daily  -eating slowly, chewing food well  -avoiding/limiting calorie containing beverages  -drinking water 15-30 minutes before or after meals  -choosing wheat, not white with breads, crackers, pastas, madelyn, bagels, tortillas, rice  -limiting restaurant or cafeteria eating to twice a week or less    We discussed the importance of restorative sleep and stress management in maintaining a healthy weight.  We discussed the National Weight Control Registry healthy " weight maintenance strategies and ways to optimize metabolism.  We discussed the importance of physical activity including cardiovascular and strength training in maintaining a healthier weight.  We discussed the importance of life-long vitamin supplementation and life-long  follow-up.    Halina was reminded that, to avoid marginal ulcers she should avoid tobacco at all, alcohol in excess, caffeine in excess, and NSAIDS (unless indicated for cardioprotection or othewise and opposed by a PPI).    DURAN Glasgow MD  [facility].  9/26/2018  3:31 PM      Much or all of the text in this note was generated through the use of Dragon Dictate voice-to-text software. Errors in spelling or words which seem out of context are unintentional. Sound alike errors, in particular, may have escaped editing.        No images are attached to the encounter.

## 2021-06-26 NOTE — PROGRESS NOTES
"Halina Ibrahim is a 55 y.o. female who is being evaluated via a billable telephone visit.      The patient has been notified of following:     \"This telephone visit will be conducted via a call between you and your physician/provider. We have found that certain health care needs can be provided without the need for a physical exam.  This service lets us provide the care you need with a short phone conversation.  If a prescription is necessary we can send it directly to your pharmacy.  If lab work is needed we can place an order for that and you can then stop by our lab to have the test done at a later time.    Telephone visits are billed at different rates depending on your insurance coverage. During this emergency period, for some insurers they may be billed the same as an in-person visit.  Please reach out to your insurance provider with any questions.    If during the course of the call the physician/provider feels a telephone visit is not appropriate, you will not be charged for this service.\"    Patient has given verbal consent to a Telephone visit? Yes    What phone number would you like to be contacted at? [phone]    Patient would like to receive their AVS by AVS Preference: Paulo.    Phone call duration: 18 minutes    Lizzeth Shipman RD          Medical  Weight Loss Follow-Up Diet Evaluation  Assessment:  Halina is presenting today for a follow up weight management nutrition consultation. Pt has had an initial appointment with Dr. Glasgow  Weight loss medication: topamax.   Pt's Initial Weight: 294 lbs  Weight: (!) 277 lb 3.2 oz (125.7 kg)  Weight loss from initial: 16.8  % Weight loss: 5.71 %    BMI: Body mass index is 44.74 kg/m .  Ideal body weight: 59.3 kg (130 lb 11.7 oz)  Adjusted ideal body weight: 85.9 kg (189 lb 5.1 oz)    Estimated RMR ([place]-St Jeor equation):   1883 kcals  Recommended Protein Intake: 60-80 grams of protein/day  Patient Active Problem List:  Patient Active Problem List "   Diagnosis     Postoperative malabsorption     Hypothyroidism     Mitral valve replaced     Aortic valve replaced     Morbid obesity with BMI of 40.0-44.9, adult (H)     Stroke (H)     History of Sabrina-en-Y gastric bypass     Metabolic syndrome     Low back pain     Degenerative disc disease     Morbid obesity (H)     History of migraine headaches     Bariatric surgery status       Progress on goals from last visit: states she hasn't been making many changes- more so maintaining  +tried Ice sparkling water and it didn't go well (contained sucralose)   +protein has been good- cooking more protein at dinner    Dietary Recall:  Dinner: will cook a couple pieces of meat at a time and will use for leftovers  Beverages: soda  Exercise: getting up from work at lunch break- let's dog out     Nutrition Diagnosis:    Overweight/Obesity (NC 3.3) related to overeating and poor lifestyle habits as evidenced by patient's report of falling into old habits such as evening snacking, large portions, soda intake, inactivity and BMI 45.1  Not ready for diet/lifestyle change (NB 1.3) related to unsupported beliefs/attitudes about food, nutrition and nutrition-related topics as evidenced by patient's subjective statements, inability to meet goals previously set      Intervention:  1. Food and/or nutrient delivery: discussed different types of sparkling water to try   2. Nutrition counseling: motivational interviewing and goal setting    Monitoring/Evaluation:    Goals:  1. [place] with different sparkling hinton  2. Maintain protein intake    Patient to follow up in 1 month(s) with DAVID

## 2021-07-06 VITALS — WEIGHT: 277.2 LBS | HEIGHT: 66 IN | BODY MASS INDEX: 44.55 KG/M2

## 2021-07-20 ENCOUNTER — TELEPHONE (OUTPATIENT)
Dept: SURGERY | Facility: CLINIC | Age: 56
End: 2021-07-20

## 2021-07-20 DIAGNOSIS — K91.2 POSTOPERATIVE MALABSORPTION: Primary | ICD-10-CM

## 2021-07-20 DIAGNOSIS — Z98.84 S/P BARIATRIC SURGERY: ICD-10-CM

## 2021-07-20 DIAGNOSIS — K90.9 INTESTINAL MALABSORPTION, UNSPECIFIED: ICD-10-CM

## 2021-07-20 NOTE — TELEPHONE ENCOUNTER
Pt has upcoming annual appt on 9/14 and would like labs at  [facility], Pt would also like labs sent to her via secured message    [phone]

## 2021-07-22 ENCOUNTER — VIRTUAL VISIT (OUTPATIENT)
Dept: SURGERY | Facility: CLINIC | Age: 56
End: 2021-07-22
Payer: COMMERCIAL

## 2021-07-22 VITALS — WEIGHT: 277 LBS | HEIGHT: 66 IN | BODY MASS INDEX: 44.52 KG/M2

## 2021-07-22 DIAGNOSIS — Z98.84 BARIATRIC SURGERY STATUS: Primary | ICD-10-CM

## 2021-07-22 DIAGNOSIS — Z71.3 NUTRITIONAL COUNSELING: ICD-10-CM

## 2021-07-22 DIAGNOSIS — E66.01 OBESITY, CLASS III, BMI 40-49.9 (MORBID OBESITY) (H): ICD-10-CM

## 2021-07-22 PROCEDURE — 97803 MED NUTRITION INDIV SUBSEQ: CPT | Performed by: DIETITIAN, REGISTERED

## 2021-07-22 ASSESSMENT — MIFFLIN-ST. JEOR: SCORE: 1868.21

## 2021-07-22 NOTE — PROGRESS NOTES
Halina Ibrahim is a 55 year old who is being evaluated via a billable telephone visit.      What phone number would you like to be contacted at? [phone]  How would you like to obtain your AVS? [facility]      Medical  Weight Loss Follow-Up Diet Evaluation  Assessment:  Halina is presenting today for a follow up weight management nutrition consultation. Pt has had an initial appointment with Dr. Glasgow  +patient is post op bariatric surgery  Weight loss medication: topamax.   Pt's weight today is 277lb, this is stable in the past month  BMI: Body mass index is 44.71 kg/m .  Ideal body weight: 59.3 kg (130 lb 11.7 oz)  Adjusted ideal body weight: 85.8 kg (189 lb 3.8 oz)    Estimated RMR ([place]-St Jeor equation):   1883 kcals x 1.2 (sedentary) = 2100 kcals (for weight maintenance)  Recommended Protein Intake: 60-80 grams of protein/day  Patient Active Problem List:  Patient Active Problem List   Diagnosis     Aortic valve disorder     S/P AVR     Progress on goals from last visit: states she has been really busy with work and has been unable to try any new sparkling hinton  +has been a bit lower than normal with protein intake- is working on improving this   +continues to take time with meals and chews her food well- portions are 1-2 cups per meal- 4oz protein and 1 cup of potatoes/starch    Dietary Recall:  Breakfast: bagel with cream cheese  Lunch:ham and cheese sliders on hawaiian rolls  Dinner: chicken caesar salad  Beverages: has been trying to increase water intake-   Drinking more coke zero than regular soda  Exercise: getting up and moving during the day- not much activity in the heat  +no actual exercise    Nutrition Diagnosis:    Overweight/Obesity (NC 3.3) related to overeating and poor lifestyle habits as evidenced by patient's report of falling into old habits such as evening snacking, large portions, soda intake, inactivity and BMI 45.1  Not ready for diet/lifestyle change (NB 1.3) related to  unsupported beliefs/attitudes about food, nutrition and nutrition-related topics as evidenced by patient's subjective statements, inability to meet goals previously set      Intervention:  1. Food and/or nutrient delivery: discussed alternate water options as well as recipe resources to help with days that she is not in the mood to cook. I encouraged her to work on decreasing portions- particularly of starches, cutting down from 1 cup to 1/2 cup per meal  2. Nutrition counseling: goal setting      Monitoring/Evaluation:    Goals:  1. Eat more vegetables- 2 per week- not including caesar salad   2. Work on increasing non-soda beverages    Patient to follow up in 2 months(s) with bariatrician and 1 month(s) with RD        Phone call duration: 30 min

## 2021-07-22 NOTE — LETTER
7/22/2021         RE: Halina Ibrahim  [address] [address]        Dear Colleague,    Thank you for referring your patient, Halina Ibrahim, to the [facility] SUR[facility]ERY [facility] AND BARIATRICS CARE [place]. Please see a copy of my visit note below.    Halina Ibrahim is a 55 year old who is being evaluated via a billable telephone visit.      What phone number would you like to be contacted at? [phone]  How would you like to obtain your AVS? [facility]      Medical  Weight Loss Follow-Up Diet Evaluation  Assessment:  Halina is presenting today for a follow up weight management nutrition consultation. Pt has had an initial appointment with Dr. Glasgow  +patient is post op bariatric surgery  Weight loss medication: topamax.   Pt's weight today is 277lb, this is stable in the past month  BMI: Body mass index is 44.71 kg/m .  Ideal body weight: 59.3 kg (130 lb 11.7 oz)  Adjusted ideal body weight: 85.8 kg (189 lb 3.8 oz)    Estimated RMR ([place]-St Jeor equation):   1883 kcals x 1.2 (sedentary) = 2100 kcals (for weight maintenance)  Recommended Protein Intake: 60-80 grams of protein/day  Patient Active Problem List:  Patient Active Problem List   Diagnosis     Aortic valve disorder     S/P AVR     Progress on goals from last visit: states she has been really busy with work and has been unable to try any new sparkling hinton  +has been a bit lower than normal with protein intake- is working on improving this   +continues to take time with meals and chews her food well- portions are 1-2 cups per meal- 4oz protein and 1 cup of potatoes/starch    Dietary Recall:  Breakfast: bagel with cream cheese  Lunch:ham and cheese sliders on hawaiian rolls  Dinner: chicken caesar salad  Beverages: has been trying to increase water intake-   Drinking more coke zero than regular soda  Exercise: getting up and moving during the day- not much activity in the heat  +no actual exercise    Nutrition  Diagnosis:    Overweight/Obesity (NC 3.3) related to overeating and poor lifestyle habits as evidenced by patient's report of falling into old habits such as evening snacking, large portions, soda intake, inactivity and BMI 45.1  Not ready for diet/lifestyle change (NB 1.3) related to unsupported beliefs/attitudes about food, nutrition and nutrition-related topics as evidenced by patient's subjective statements, inability to meet goals previously set      Intervention:  1. Food and/or nutrient delivery: discussed alternate water options as well as recipe resources to help with days that she is not in the mood to cook. I encouraged her to work on decreasing portions- particularly of starches, cutting down from 1 cup to 1/2 cup per meal  2. Nutrition counseling: goal setting      Monitoring/Evaluation:    Goals:  1. Eat more vegetables- 2 per week- not including caesar salad   2. Work on increasing non-soda beverages    Patient to follow up in 2 months(s) with bariatrician and 1 month(s) with RD        Phone call duration: 30 min          Again, thank you for allowing me to participate in the care of your patient.        Sincerely,        NICOLE TORIBIO RD

## 2021-07-22 NOTE — TELEPHONE ENCOUNTER
Left pt a message asking if I can make her an appointment at our [place] location to get her labs done prior to her visit in September.     Vivian Steiner RN, CBN  [facility]  P [phone]  F [phone]

## 2021-08-26 ENCOUNTER — VIRTUAL VISIT (OUTPATIENT)
Dept: SURGERY | Facility: CLINIC | Age: 56
End: 2021-08-26
Payer: COMMERCIAL

## 2021-08-26 DIAGNOSIS — Z71.3 NUTRITIONAL COUNSELING: ICD-10-CM

## 2021-08-26 DIAGNOSIS — Z98.84 BARIATRIC SURGERY STATUS: Primary | ICD-10-CM

## 2021-08-26 DIAGNOSIS — E66.01 OBESITY, CLASS III, BMI 40-49.9 (MORBID OBESITY) (H): ICD-10-CM

## 2021-08-26 PROCEDURE — 97803 MED NUTRITION INDIV SUBSEQ: CPT | Mod: TEL | Performed by: DIETITIAN, REGISTERED

## 2021-08-26 NOTE — LETTER
8/26/2021         RE: Halina Ibrahim  [address] [address]        Dear Colleague,    Thank you for referring your patient, Halina Ibrahim, to the [facility] SUR[facility]ERY [facility] AND BARIATRICS CARE [place]. Please see a copy of my visit note below.    Halina Ibrahim is a 55 year old who is being evaluated via a billable telephone visit.      What phone number would you like to be contacted at? [phone]  How would you like to obtain your AVS? [facility]    Medical  Weight Loss Follow-Up Diet Evaluation  Assessment:  Halina is presenting today for a follow up weight management nutrition consultation. Pt has had an initial appointment with Dr. Glasgow  +post bariatric surgery- taking most of vitamins- trying to find an alternate MVI  Weight loss medication: topamax.   Pt's weight is 0 lbs 0 oz- patient states she did not recall her most recent weight  BMI: There is no height or weight on file to calculate BMI.  Ideal body weight: 59.3 kg (130 lb 11.7 oz)  Adjusted ideal body weight: 85.8 kg (189 lb 3.8 oz)    Recommended Protein Intake: 60-80 grams of protein/day  Patient Active Problem List:  Patient Active Problem List   Diagnosis     Aortic valve disorder     S/P AVR     Progress on goals from last visit: states that not a whole lot has happened since we talked last  +did try some liquid IV- will have a flavored water in the morning  Goals:   1. Increase non-soda choices- goal met- tried liquid IV  2. 2 veggies per week- goal not met  +states she is cooking at home- states she is getting bored with chicken, will have a burger, steak, pork chop, etc.    Nutrition Diagnosis:    Overweight/Obesity (NC 3.3) related to overeating and poor lifestyle habits as evidenced by patient's report of falling into old habits such as evening snacking, large portions, soda intake, inactivity and BMI 45.1  Not ready for diet/lifestyle change (NB 1.3) related to unsupported beliefs/attitudes about food,  nutrition and nutrition-related topics as evidenced by patient's subjective statements, inability to meet goals previously set      Intervention:  1. Food and/or nutrient delivery: encouraged her to continue to experiment with non-soda beverages  2. Nutrition counseling: goal setting    Monitoring/Evaluation:    Goals:  1. Eat 2 veggies per week excluding her salad that she has once a week  2. Find a non-soda alternative    Patient to follow up in 1 month(s) with bariatrician and 2 month(s) with RD        Phone call duration: 15 minutes        Again, thank you for allowing me to participate in the care of your patient.        Sincerely,        NICOLE TORIBIO RD

## 2021-08-26 NOTE — PROGRESS NOTES
Halina Ibrahim is a 55 year old who is being evaluated via a billable telephone visit.      What phone number would you like to be contacted at? [phone]  How would you like to obtain your AVS? [facility]    Medical  Weight Loss Follow-Up Diet Evaluation  Assessment:  Halina is presenting today for a follow up weight management nutrition consultation. Pt has had an initial appointment with Dr. Glasgow  +post bariatric surgery- taking most of vitamins- trying to find an alternate MVI  Weight loss medication: topamax.   Pt's weight is 0 lbs 0 oz- patient states she did not recall her most recent weight  BMI: There is no height or weight on file to calculate BMI.  Ideal body weight: 59.3 kg (130 lb 11.7 oz)  Adjusted ideal body weight: 85.8 kg (189 lb 3.8 oz)    Recommended Protein Intake: 60-80 grams of protein/day  Patient Active Problem List:  Patient Active Problem List   Diagnosis     Aortic valve disorder     S/P AVR     Progress on goals from last visit: states that not a whole lot has happened since we talked last  +did try some liquid IV- will have a flavored water in the morning  Goals:   1. Increase non-soda choices- goal met- tried liquid IV  2. 2 veggies per week- goal not met  +states she is cooking at home- states she is getting bored with chicken, will have a burger, steak, pork chop, etc.    Nutrition Diagnosis:    Overweight/Obesity (NC 3.3) related to overeating and poor lifestyle habits as evidenced by patient's report of falling into old habits such as evening snacking, large portions, soda intake, inactivity and BMI 45.1  Not ready for diet/lifestyle change (NB 1.3) related to unsupported beliefs/attitudes about food, nutrition and nutrition-related topics as evidenced by patient's subjective statements, inability to meet goals previously set      Intervention:  1. Food and/or nutrient delivery: encouraged her to continue to experiment with non-soda beverages  2. Nutrition counseling: goal  setting    Monitoring/Evaluation:    Goals:  1. Eat 2 veggies per week excluding her salad that she has once a week  2. Find a non-soda alternative    Patient to follow up in 1 month(s) with bariatrician and 2 month(s) with RD        Phone call duration: 15 minutes

## 2021-09-19 ENCOUNTER — HEALTH MAINTENANCE LETTER (OUTPATIENT)
Age: 56
End: 2021-09-19

## 2021-10-18 NOTE — PROGRESS NOTES
Halina Ibrahim is 56 year old  female who presents for a billable video visit today.    How would you like to obtain your AVS? MyChart  If dropped from the video visit, the video invitation should be resent by: Send to e-mail at: [email or web address]  Will anyone else be joining your video visit? No      Video Start Time: 11:45 am    Provider Notes:     Bariatric Care Clinic Follow Up Visit for Previous Bariatric Surgery   Date of visit: 10/19/2021  Physician: DURAN Glasgow MD, MD  Primary Care is Lu Carrillo  Halina Ibrahim   56 year old  female    Date of Surgery: 9/14/2004  Initial Weight: 296#  Today's Weight:   Wt Readings from Last 1 Encounters:   10/19/21 125.6 kg (277 lb)     Body mass index is 44.71 kg/m .       Assessment and Plan   Assessment: Halina is a 56 year old year old female who is 17 years s/p RNY gastric bypass with Dr. Macario. They have had a durable weight loss of 19 lbs since surgery.  Overall compliance with the [facility] Bariatric Surgery Program has been excellent.      Plan:    1. Postoperative malabsorption  Patient is taking all vitamins as directed. Routine labs were reviewed. Her PTH is mildly elevated despite taking 20,000 international unit(s) of vitamin D3 daily and calcium citrate twice a day. She had a normal bone density in 2018. She will cut out soda.    2. Morbid obesity  Patient was congratulated on her success thus far. Healthy habits to assist with further weight loss were discussed. She will cut out soda. She will continue the topamax        Total time spent on the date of this encounter doing: chart review, review of test results, patient visit, physical exam, education, counseling, developing plan of care and documenting = 57 minutes.      Bariatric Surgery Review   Interim History/LifeChanges: Patient is taking topamax for appetite control. She is tolerating it well. She plans to stop the pepsi.     Patient Concerns: eating habits are not as good since she  has been working from home, she is not planning ahead as much    GERD no    Medication changes: no recent    Vitamin Intake:   Multivitamin   2 with iron per day   Vitamin D  10,000 international unit(s) twice a day   Calcium  citrate twice a day   Vit. B-12    super B complex     Habits:            Alcohol Intake  none   NSAID Use  none   Caffeine Use  coke 0 and pepsi (1-3 cans per day)   Exercise  no intentional exercise, stairs in her house, yard work, gardening   CPAP  not discussed   Birth Control  menopause   Tobacco Use     no     Meals: B: bagel with cream cheese and cottage cheese  L: protein snack packs D: meat or frozen lasagne bowl                                 LABS: reviewed      LABS:  Lab Results   Component Value Date    WBC 9.4 03/20/2013    HGB 8.7 (L) 03/20/2013    HCT 27.6 (L) 03/20/2013    MCV 85 03/20/2013     03/20/2013      No components found for: [id number] Lab Results   Component Value Date    HGBA1C 5.6 09/06/2019      Lab Results   Component Value Date    CHOL 158 03/05/2010    No components found for: PTH      No components found for: FERRITIN   Lab Results   Component Value Date    HDL 40 (L) 03/05/2010      No components found for: [id number] No components found for: 70881   No components found for: LDLCALC No results found for: TSH No components found for: FOLATE   Lab Results   Component Value Date    TRIG 75 03/05/2010    Lab Results   Component Value Date    ALT 44 03/14/2013     (H) 03/14/2013    ALKPHOS 43 03/14/2013    No results found for: TESTOSTERONE     No components found for: CHOLHDL No components found for: 7597   @resusfast(vitamin a: 1)@             Patient Profile   Social History     Social History Narrative     Not on file        Past Medical History   Past Medical History:   Diagnosis Date     Aortic regurgitation      Aortic valve replaced      Degenerative disc disease      Dyslipidemia      Hashimoto's disease      History of Sabrina-en-Y gastric  "bypass 9/14/2004    Dr. Macario Highest weight 296#     Hypertension      Low back pain      Metabolic syndrome      Mitral valve replaced      Morbid obesity (H)      Morbid obesity with BMI of 40.0-44.9, adult (H)      Other and unspecified postsurgical nonabsorption      S/P gastric bypass      S/P MVR (mitral valve replacement)      Stroke (H)      Stroke (H)     from mass near mitral valve     Patient Active Problem List   Diagnosis     Aortic valve disorder     S/P AVR     Restless leg syndrome       Past Surgical History  She has a past surgical history that includes s/p mitral valve replacement[; s/p gastric bypass[; Redo sternotomy replace valve aortic (3/14/2013); tubal ligation; other surgical history; Revision Sabrina-En-Y; aortic valve replacement; and Replace valve mitral.     Examination   Ht 1.676 m (5' 6\")   Wt 125.6 kg (277 lb)   BMI 44.71 kg/m      GENERAL: Healthy, alert and no distress  EYES: Eyes grossly normal to inspection.  No discharge or erythema, or obvious scleral/conjunctival abnormalities.  RESP: No audible wheeze, cough, or visible cyanosis.  No visible retractions or increased work of breathing.    SKIN: Visible skin clear. No significant rash, abnormal pigmentation or lesions.  NEURO: Cranial nerves grossly intact.  Mentation and speech appropriate for age.  PSYCH: Mentation appears normal, affect normal/bright, judgement and insight intact, normal speech and appearance well-groomed.         Counseling:   We reviewed the important post op bariatric recommendations:  -eating 3 meals daily  -eating protein first, getting >60gm protein daily  -eating slowly, chewing food well  -avoiding/limiting calorie containing beverages  -drinking water 15-30 minutes before or after meals  -choosing wheat, not white with breads, crackers, pastas, madelyn, bagels, tortillas, rice  -limiting restaurant or cafeteria eating to twice a week or less    We discussed the importance of restorative sleep and " stress management in maintaining a healthy weight.  We discussed the National Weight Control Registry healthy weight maintenance strategies and ways to optimize metabolism.  We discussed the importance of physical activity including cardiovascular and strength training in maintaining a healthier weight.  We discussed the importance of life-long vitamin supplementation and life-long  follow-up.    Halina was reminded that, to avoid marginal ulcers she should avoid tobacco at all, alcohol in excess, caffeine in excess, and NSAIDS (unless indicated for cardioprotection or othewise and opposed by a PPI).    DURAN Glasgow MD  [facility] Bariatric clinic  10/19/2021  7:35 AM            No images are attached to the encounter.        Video-Visit Details    Type of service:  Video Visit    Video End Time (time video stopped): 12:22 PM  Originating Location (pt. Location): Home    Distant Location (provider location):  [facility] SURGERY CLINIC AND BARIATRICS CARE [place]     Platform used for Video Visit: [employer]

## 2021-10-19 ENCOUNTER — VIRTUAL VISIT (OUTPATIENT)
Dept: SURGERY | Facility: CLINIC | Age: 56
End: 2021-10-19
Payer: COMMERCIAL

## 2021-10-19 VITALS — HEIGHT: 66 IN | BODY MASS INDEX: 44.52 KG/M2 | WEIGHT: 277 LBS

## 2021-10-19 DIAGNOSIS — K91.2 POSTOPERATIVE MALABSORPTION: Primary | ICD-10-CM

## 2021-10-19 DIAGNOSIS — E66.01 MORBID OBESITY (H): ICD-10-CM

## 2021-10-19 PROCEDURE — 99215 OFFICE O/P EST HI 40 MIN: CPT | Mod: GT | Performed by: FAMILY MEDICINE

## 2021-10-19 RX ORDER — MELATONIN 10 MG
250 TABLET, SUBLINGUAL SUBLINGUAL
Qty: 100 TABLET | Refills: 4
Start: 2021-10-19 | End: 2022-09-26

## 2021-10-19 ASSESSMENT — MIFFLIN-ST. JEOR: SCORE: 1863.21

## 2021-10-19 NOTE — LETTER
10/19/2021         RE: Halina Ibrahim  [address] [facility], [place] [address]        Dear Colleague,    Thank you for referring your patient, Halina Ibrahim, to the [facility] SURGERY CLINIC [facility] BARIATRICS CARE [place]. Please see a copy of my visit note below.    Halina Ibrahim is 56 year old  female who presents for a billable video visit today.    How would you like to obtain your AVS? MyChart  If dropped from the video visit, the video invitation should be resent by: Send to e-mail at: [email or web address]  Will anyone else be joining your video visit? No      Video Start Time: 11:45 am    Provider Notes:     Bariatric Care Clinic Follow Up Visit for Previous Bariatric Surgery   Date of visit: 10/19/2021  Physician: DURAN Glasgow MD, MD  Primary Care is Lu Carrillo  Halina Ibrahim   56 year old  female    Date of Surgery: 9/14/2004  Initial Weight: 296#  Today's Weight:   Wt Readings from Last 1 Encounters:   10/19/21 125.6 kg (277 lb)     Body mass index is 44.71 kg/m .       Assessment and Plan   Assessment: Halina is a 56 year old year old female who is 17 years s/p RNY gastric bypass with Dr. Macario. They have had a durable weight loss of 19 lbs since surgery.  Overall compliance with the [facility] Bariatric Surgery Program has been excellent.      Plan:    1. Postoperative malabsorption  Patient is taking all vitamins as directed. Routine labs were reviewed. Her PTH is mildly elevated despite taking 20,000 international unit(s) of vitamin D3 daily and calcium citrate twice a day. She had a normal bone density in 2018. She will cut out soda.    2. Morbid obesity  Patient was congratulated on her success thus far. Healthy habits to assist with further weight loss were discussed. She will cut out soda. She will continue the topamax        Total time spent on the date of this encounter doing: chart review, review of test results, patient visit, physical exam, education, counseling,  developing plan of care and documenting = 57 minutes.      Bariatric Surgery Review   Interim History/LifeChanges: Patient is taking topamax for appetite control. She is tolerating it well. She plans to stop the pepsi.     Patient Concerns: eating habits are not as good since she has been working from home, she is not planning ahead as much    GERD no    Medication changes: no recent    Vitamin Intake:   Multivitamin   2 with iron per day   Vitamin D  10,000 international unit(s) twice a day   Calcium  citrate twice a day   Vit. B-12    super B complex     Habits:            Alcohol Intake  none   NSAID Use  none   Caffeine Use  coke 0 and pepsi (1-3 cans per day)   Exercise  no intentional exercise, stairs in her house, yard work, gardening   CPAP  not discussed   Birth Control  menopause   Tobacco Use     no     Meals: B: bagel with cream cheese and cottage cheese  L: protein snack packs D: meat or frozen lasagne bowl                                 LABS: reviewed      LABS:  Lab Results   Component Value Date    WBC 9.4 03/20/2013    HGB 8.7 (L) 03/20/2013    HCT 27.6 (L) 03/20/2013    MCV 85 03/20/2013     03/20/2013      No components found for: [id number] Lab Results   Component Value Date    HGBA1C 5.6 09/06/2019      Lab Results   Component Value Date    CHOL 158 03/05/2010    No components found for: PTH      No components found for: FERRITIN   Lab Results   Component Value Date    HDL 40 (L) 03/05/2010      No components found for: [id number] No components found for: 37629   No components found for: LDLCALC No results found for: TSH No components found for: FOLATE   Lab Results   Component Value Date    TRIG 75 03/05/2010    Lab Results   Component Value Date    ALT 44 03/14/2013     (H) 03/14/2013    ALKPHOS 43 03/14/2013    No results found for: TESTOSTERONE     No components found for: CHOLHDL No components found for: 7597   @Optimusfast(vitamin a: 1)@             Patient Profile   Social  "History     Social History Narrative     Not on file        Past Medical History   Past Medical History:   Diagnosis Date     Aortic regurgitation      Aortic valve replaced      Degenerative disc disease      Dyslipidemia      Hashimoto's disease      History of Sabrina-en-Y gastric bypass 9/14/2004    Dr. Macario Highest weight 296#     Hypertension      Low back pain      Metabolic syndrome      Mitral valve replaced      Morbid obesity (H)      Morbid obesity with BMI of 40.0-44.9, adult (H)      Other and unspecified postsurgical nonabsorption      S/P gastric bypass      S/P MVR (mitral valve replacement)      Stroke (H)      Stroke (H)     from mass near mitral valve     Patient Active Problem List   Diagnosis     Aortic valve disorder     S/P AVR     Restless leg syndrome       Past Surgical History  She has a past surgical history that includes s/p mitral valve replacement[; s/p gastric bypass[; Redo sternotomy replace valve aortic (3/14/2013); tubal ligation; other surgical history; Revision Sabrina-En-Y; aortic valve replacement; and Replace valve mitral.     Examination   Ht 1.676 m (5' 6\")   Wt 125.6 kg (277 lb)   BMI 44.71 kg/m      GENERAL: Healthy, alert and no distress  EYES: Eyes grossly normal to inspection.  No discharge or erythema, or obvious scleral/conjunctival abnormalities.  RESP: No audible wheeze, cough, or visible cyanosis.  No visible retractions or increased work of breathing.    SKIN: Visible skin clear. No significant rash, abnormal pigmentation or lesions.  NEURO: Cranial nerves grossly intact.  Mentation and speech appropriate for age.  PSYCH: Mentation appears normal, affect normal/bright, judgement and insight intact, normal speech and appearance well-groomed.         Counseling:   We reviewed the important post op bariatric recommendations:  -eating 3 meals daily  -eating protein first, getting >60gm protein daily  -eating slowly, chewing food well  -avoiding/limiting calorie " containing beverages  -drinking water 15-30 minutes before or after meals  -choosing wheat, not white with breads, crackers, pastas, madelyn, bagels, tortillas, rice  -limiting restaurant or cafeteria eating to twice a week or less    We discussed the importance of restorative sleep and stress management in maintaining a healthy weight.  We discussed the National Weight Control Registry healthy weight maintenance strategies and ways to optimize metabolism.  We discussed the importance of physical activity including cardiovascular and strength training in maintaining a healthier weight.  We discussed the importance of life-long vitamin supplementation and life-long  follow-up.    Halina was reminded that, to avoid marginal ulcers she should avoid tobacco at all, alcohol in excess, caffeine in excess, and NSAIDS (unless indicated for cardioprotection or othewise and opposed by a PPI).    DURAN Glasgow MD  [facility] Bariatric clinic  10/19/2021  7:35 AM            No images are attached to the encounter.        Video-Visit Details    Type of service:  Video Visit    Video End Time (time video stopped): 12:22 PM  Originating Location (pt. Location): Home    Distant Location (provider location):  [facility] SURGERY CLINIC AND BARIATRICS CARE [place]     Platform used for Video Visit: Pat        Again, thank you for allowing me to participate in the care of your patient.        Sincerely,        DURAN Glasgow MD

## 2021-10-19 NOTE — PATIENT INSTRUCTIONS
[facility] Bariatric Care  Nutritional Guidelines  Gastric Bypass 18 Months Post Op and Beyond    General Guidelines and Helpful Hints:    Eat 3 meals per day + protein supplement(s). No snacks between meals.  o Do not skip meals.  This can cause overeating at the next meal and will prevent adequate protein and nutritional intake.    Aim for 60-80 grams of protein per day.  o Always eat your protein first. This assists with optimal nutrition and helps you stay full longer.  o Depending on your portion size, you may need to drink approved protein supplement between meals to achieve protein goals. Follow recommendations of your Dietitian.     Eat your protein first, and then follow with fiber.   o It is not necessary to count your fiber, but 15-20 grams per day is recommended.    o Add fiber by including fruits, vegetables, whole grains, and beans.     Portions should remain about 1 cup per meal. Use measuring cups to be accurate.    Continue to use saucer/salad plates, infant/toddler silverware to keep portion sizes small and take small bites.    Eat S-L-O-W-L-Y to make each meal last 20-30 minutes. Always stop eating when satisfied.    Continue to use caution with foods containing skins, peels or membranes. Chew well!    Aim for 64 oz. of calorie-free fluids daily.  o Continue to avoid caffeine and carbonation. If you choose to drink alcohol, do so in moderation.   o Remember to avoid drinking during meals, 15-30 minutes before and 30 minutes after.    Exercise is brady for continued weight loss and weight maintenance. Aim for 30-60 minutes of physical activity most days of the week. Include cardiovascular and strength training.    If having trouble tolerating meat, try using a crock-pot, tinfoil tent, steamer or other moist cooking method to create tender meats. Add broth or low-fat gravy to help meat stay moist.     Avoid high sugar and high fat foods to prevent dumping syndrome.  o Check nutrition labels for  less than 10 grams of sugar and less than 10 grams of fat per serving.    Continue Taking Vitamins/Minerals:  o 5411-3096 mcg of Sublingual B-12 daily  o 1 Multivitamin with Iron twice daily (chewable or swallow tabs)  o 500-600 mg Calcium Citrate twice daily (chewable or swallow tabs)  o 5000 IU Vitamin D3 daily  o An additional iron tablet for menstruating females  o You may also need an additional thiamine or B Complex tablet    Sample Grocery List    Protein:    Fat free Greek or light yogurt (less than 10 grams sugar)    Fat free or low-fat cottage cheese    String cheese or reduced fat cheese slices    Tuna, salmon, crab, egg, or chicken salad made with light or fat free mayonnaise    Egg or Egg Substitute    Lean/extra lean turkey, beef, bison, venison (ground, sirloin, round, flank)    Pork loin or tenderloin (grilled, baked, broiled)    Fish such as salmon, tuna, trout, tilapia, etc. (grilled, baked, broiled)    Tender cuts of lean (skinless) turkey or chicken    Lean deli meats: turkey, lean ham, chicken, lean roast beef    Beans such as kidney, garbanzo, black, clinton, or low-fat/fat free refried beans    Peanut butter (natural preferred). Limit to 1 Tbsp. per day.    Low-fat meatloaf (made with lean ground beef or turkey)    Sloppy Joes made with low-sugar ketchup and lean ground beef or turkey    Soy or vegetable protein (i.e. vegan crumbles, soy/veggie burger, tofu)    Hummus    Vegetables:    Fresh: cooked or raw (as tolerated)    Frozen vegetables    Canned vegetables (low sodium or no salt added, rinse before cooking/eating)    (Ok to have skins/peels/membranes/seeds - just chew well)    Fruits:    Fresh fruit    Frozen fruit (no sugar added)    Canned fruit (packed in its own juice, NOT syrup)    (Ok to have skins/peels/membranes/seeds - just chew well)    Starch:    Unsweetened whole-grain hot cereal (or high fiber cold cereal, dry)    Toasted whole wheat bread or [place] Thins    Whole grain  crackers    Baked /boiled/mashed potato/sweet potato    Cooked whole grain pasta, brown rice, or other cooked whole grains    Starchy vegetables: corn, peas, winter squash    Protein Supplement:     Ready to drink protein shake with:  o 15-30 grams protein per serving  o Less than 10 grams total carbohydrate per serving     Protein powder mixed with:  o  Skim or 1% milk  o Low fat or fat free Lactaid milk, plain or no sugar added soymilk  o Water     Fats: (use in moderation)    1 teaspoon of soft tub margarine    1 teaspoon olive oil, canola oil, or peanut oil    1 tablespoon of low-fat deluca or salad dressing     Sample Menu for 18+ months after Gastric Bypass    You do NOT need to eat/drink the full portion sizes listed below  Always stop when you are satisfied    Breakfast   cup 1% cottage cheese     cup mixed berries   Lunch 2 oz lean roast beef on   [place] Thin with 1 tsp. light deluca    small tomato, chopped, mixed with 1 tsp. light vinaigrette dressing   Supplement Approved protein supplement (if needed between meals)   Dinner 2 oz grilled salmon    cup salad greens with 1 tsp. light salad dressing and 1 tsp. ground flax seed    cup quinoa or brown rice     Breakfast   cup egg substitute with   cup sautéed chopped vegetables  2 light [place] Krisp crackers   Lunch Tuna Melt:   cup tuna mixed with 1 tsp. light deluca over   [place] Thin. Top with 2-3 slices cucumber and 1 oz slice of low fat cheese   Supplement 1 cup skim milk (if needed between meals)   Dinner 3 oz  grilled, broiled, or baked seasoned skinless chicken breast    cup asparagus     Breakfast   cup plain oatmeal made with skim or 1% milk with 1 Tbsp. flavored/unflavored protein powder added  1 mozzarella string cheese   Lunch 2 oz deli turkey breast  1/3 cup salad with 1 tsp. light salad dressing, 1/8 of a whole avocado and 1 Tbsp. sunflower seeds   Dinner 3 oz. pork loin made in a crock pot, seasoned with a spice rub    cup cooked carrots    Supplement Approved protein supplement (if needed between meals)     Breakfast 1 cup breakfast casserole made with egg substitute, turkey sausage,  and steamed, chopped bell peppers   Supplement  1 cup light Greek yogurt (if needed between meals)   Lunch 2 oz. teriyaki turkey    cup mashed sweet potato with 1-2 spritzes of spray butter (like Park[place])    cup fresh pineapple   Dinner 3 oz low fat meatloaf    cup roasted garlic zucchini     Breakfast   cup leftover breakfast casserole    cup no sugar added applesauce with 1 Tbsp. unflavored protein powder and a sprinkle of cinnamon    Lunch 3 oz shrimp with 1-2 Tbsp. low-sugar cocktail sauce for dipping    c. whole wheat pasta drizzled with   tsp. olive oil   Supplement 1 cup skim/1% milk with scoop of protein powder (if needed between meals)   Dinner Grilled, seasoned kebob with 2 oz lean beef and   cup vegetables     Breakfast Breakfast pizza:   [place] Thin spread with 1 Tbsp. low sugar spaghetti sauce,   cup shredded low fat cheese, melted and 1 slice of Japanese [place]     cup fresh fruit mixed with chopped almonds   Lunch   cup black bean soup  4-5 whole grain crackers   Dinner 3 oz  tilapia with lemon pepper seasoning    cup stewed tomatoes   Supplement 1 string cheese (if needed between meals)     Breakfast 2 hard boiled eggs (discard 1 egg yolk)    whole wheat English Muffin with 1 tsp. low sugar jelly   Lunch   cup leftover black bean soup topped with 1-2 Tbsp. low fat cheese  2-3 light [place] Krisp crackers   Supplement Approved protein supplement (if needed between meals)   Dinner 3 oz sirloin steak    cup steamed broccoli

## 2021-11-04 ENCOUNTER — VIRTUAL VISIT (OUTPATIENT)
Dept: SURGERY | Facility: CLINIC | Age: 56
End: 2021-11-04
Payer: COMMERCIAL

## 2021-11-04 DIAGNOSIS — Z98.84 BARIATRIC SURGERY STATUS: Primary | ICD-10-CM

## 2021-11-04 DIAGNOSIS — Z71.3 NUTRITIONAL COUNSELING: ICD-10-CM

## 2021-11-04 DIAGNOSIS — E66.01 OBESITY, CLASS III, BMI 40-49.9 (MORBID OBESITY) (H): ICD-10-CM

## 2021-11-04 PROCEDURE — 97803 MED NUTRITION INDIV SUBSEQ: CPT | Mod: TEL | Performed by: DIETITIAN, REGISTERED

## 2021-11-04 NOTE — PROGRESS NOTES
Halina Ibrahim is a 56 year old who is being evaluated via a billable telephone visit.      What phone number would you like to be contacted at? [phone]  How would you like to obtain your AVS? [facility]  Weight Loss Follow-Up Diet Evaluation  Assessment:  Halina is presenting today for a follow up weight management nutrition consultation. Pt has had an initial appointment with Dr. Glasgow  Weight loss medication: topamax.   No new weight today  Initial weight: 296lb  BMI: There is no height or weight on file to calculate BMI.  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 Food and/or nutrient delivery: encouraged patient to stay on track with protein and a balanced diet.   2. Nutrition counseling: support for continued success    Monitoring/Evaluation:    Goals:  1. Would like to try to get back into the habit of eating how she was when  she was at work    Patient to follow up in 1 month(s) with RD      Phone call duration: 30 minutes      NICOLE TORIBIO RD

## 2021-11-04 NOTE — LETTER
11/4/2021         RE: Halina Ibrahim  [address] [address]        Dear Colleague,    Thank you for referring your patient, Halina Ibrahim, to the [facility] SURGERY CLINIC AND BARIATRICS CARE [place]. Please see a copy of my visit note below.    Halina Ibrahim is a 56 year old who is being evaluated via a billable telephone visit.      What phone number would you like to be contacted at? [phone]  How would you like to obtain your AVS? [facility]          Medical  Weight Loss Follow-Up Diet Evaluation  Assessment:  Halina is presenting today for a follow up weight management nutrition consultation. Pt has had an initial appointment with Dr. Glasgow  Weight loss medication: topamax.   No new weight today  Initial weight: 296lb  BMI: There is no height or weight on file to calculate BMI.  Ideal body weight: 59.3 kg (130 lb 11.7 oz)  Adjusted ideal body weight: 85.8 kg (189 lb 3.8 oz)    Recommended Protein Intake: 60-80 grams of protein/day  Patient Active Problem List:  Patient Active Problem List   Diagnosis     Aortic valve disorder     S/P AVR     Restless leg syndrome     Progress on goals from last visit: states she has been having some teeth issues- causing a bit of difficulty with chewing  +protein intake has not been the greatest, INR is a bit off- has been working on getting this back on track- spinach salads  +states she wasn't doing a great job with veggies  Exercise: doing a bit better with water    Nutrition Diagnosis:    Overweight/Obesity (NC 3.3) related to overeating and poor lifestyle habits as evidenced by patient's report of falling into old habits such as evening snacking, large portions, soda intake, inactivity and BMI 45.1  Not ready for diet/lifestyle change (NB 1.3) related to unsupported beliefs/attitudes about food, nutrition and nutrition-related topics as evidenced by patient's subjective statements, inability to meet goals previously  set      Intervention:  1. Food and/or nutrient delivery: encouraged patient to stay on track with protein and a balanced diet.   2. Nutrition counseling: support for continued success    Monitoring/Evaluation:    Goals:  1. Would like to try to get back into the habit of eating how she was when she was at work    Patient to follow up in 1 month(s) with RD      Phone call duration: 30 minutes      NICOLE TORIBIO RD        Again, thank you for allowing me to participate in the care of your patient.        Sincerely,        NICOLE TORIBIO RD

## 2021-11-14 ENCOUNTER — HEALTH MAINTENANCE LETTER (OUTPATIENT)
Age: 56
End: 2021-11-14

## 2021-11-16 DIAGNOSIS — E66.01 OBESITY, CLASS III, BMI 40-49.9 (MORBID OBESITY) (H): Primary | ICD-10-CM

## 2021-11-16 RX ORDER — TOPIRAMATE 50 MG/1
TABLET, FILM COATED ORAL
Qty: 60 TABLET | Refills: 0 | Status: SHIPPED | OUTPATIENT
Start: 2021-11-16 | End: 2021-12-16

## 2021-12-02 ENCOUNTER — VIRTUAL VISIT (OUTPATIENT)
Dept: FAMILY MEDICINE | Facility: CLINIC | Age: 56
End: 2021-12-02
Payer: COMMERCIAL

## 2021-12-02 DIAGNOSIS — U07.1 COVID-19 VIRUS INFECTION: Primary | ICD-10-CM

## 2021-12-02 PROCEDURE — 99203 OFFICE O/P NEW LOW 30 MIN: CPT | Mod: 95 | Performed by: PHYSICIAN ASSISTANT

## 2021-12-02 RX ORDER — MORPHINE SULFATE 15 MG/1
15 TABLET, FILM COATED, EXTENDED RELEASE ORAL EVERY 12 HOURS
COMMUNITY

## 2021-12-02 RX ORDER — CITALOPRAM HYDROBROMIDE 10 MG/1
10 TABLET ORAL DAILY
COMMUNITY

## 2021-12-02 NOTE — PATIENT INSTRUCTIONS
Saloni Meade,    Thank you for allowing [facility] to manage your care.    Your symptoms are due to COVID-19. If you develop worsening/changing symptoms at any time, please go to the emergency department for evaluation.    Drink 8-10 glasses of fluid daily to stay well-hydrated.    For your pain, please use Tylenol 650mg every 4 hours for pain.     Max acetaminophen (Tylenol) 4,000mg/24 hours    If you have any questions or concerns, please feel free to call us at [phone]    Sincerely,    Juan Antonio Donovan PA-C    Did you know?      You can schedule a video visit for follow-up appointments as well as future appointments for certain conditions.  Please see the below link.     [email or web address]    If you have not already done so,  I encourage you to sign up for [employer] ([email or web address]).  This will allow you to review your results, securely communicate with a provider, and schedule virtual visits as well.      Patient Education   Discharge Instructions for COVID-19 Patients  You have--or may have--COVID-19. Please follow the instructions listed below.   If you have a weakened immune system, discuss with your doctor any other actions you need to take.  How can I protect others?  If you have symptoms (fever, cough, body aches or trouble breathing):    Stay home and away from others (self-isolate) until:  ? Your other symptoms have resolved (gotten better). And   ? You've had no fever--and no medicine that reduces fever--for 1 full day (24 hours). And   ? At least 10 days have passed since your symptoms started. (You may need to wait 20 days. Follow the advice of your care team.)  If you don't show symptoms, but testing showed that you have COVID-19:    Stay home and away from others (self-isolate) until at least 10 days have passed since the date of your first positive COVID-19 test.  During this time    Stay in your own room, even for meals. Use your  "own bathroom if you can.    Stay away from others in your home. No hugging, kissing or shaking hands. No visitors.    Don't go to work, school or anywhere else.    Clean \"high touch\" surfaces often (doorknobs, counters, handles). Use household cleaning spray or wipes.    You'll find a full list of  on the EPA website: www.epa.gov/pesticide-registration/list-n-disinfectants-use-against-sars-cov-2.    Cover your mouth and nose with a mask or other face covering to avoid spreading germs.    Wash your hands and face often. Use soap and water.    Caregivers in these groups are at risk for severe illness due to COVID-19:  ? People 65 years and older  ? People who live in a nursing home or long-term care facility  ? People with chronic disease (lung, heart, cancer, diabetes, kidney, liver, immunologic)  ? People who have a weakened immune system, including those who:    Are in cancer treatment    Take medicine that weakens the immune system, such as corticosteroids    Had a bone marrow or organ transplant    Have an immune deficiency    Have poorly controlled HIV or AIDS    Are obese (body mass index of 40 or higher)    Smoke regularly    Caregivers should wear gloves while washing dishes, handling laundry and cleaning bedrooms and bathrooms.    Use caution when washing and drying laundry: Don't shake dirty laundry and use the warmest water setting that you can.    For more tips on managing your health at home, go to www.cdc.gov/coronavirus/2019-ncov/downloads/10Things.pdf.  How can I take care of myself at home?  1. Get lots of rest. Drink extra fluids (unless a doctor has told you not to).  2. Take Tylenol (acetaminophen) for fever or pain. If you have liver or kidney problems, ask your family doctor if it's okay to take Tylenol.   Adults can take either:   ? 650 mg (two 325 mg pills) every 4 to 6 hours, or   ? 1,000 mg (two 500 mg pills) every 8 hours as needed.  ? Note: Don't take more than 3,000 mg in one day. " Acetaminophen is found in many medicines (both prescribed and over-the-counter medicines). Read all labels to be sure you don't take too much.   For children, check the Tylenol bottle for the right dose. The dose is based on the child's age or weight.  3. If you have other health problems (like cancer, heart failure, an organ transplant or severe kidney disease): Call your specialty clinic if you don't feel better in the next 2 days.  4. Know when to call 911. Emergency warning signs include:  ? Trouble breathing or shortness of breath  ? Pain or pressure in the chest that doesn't go away  ? Feeling confused like you haven't felt before, or not being able to wake up  ? Bluish-colored lips or face  5. Your doctor may have prescribed a blood thinner medicine. Follow their instructions.  Where can I get more information?    [facility] - About COVID-19:   [email or web address]    CDC - What to Do If You're Sick: www.cdc.gov/coronavirus/2019-ncov/about/steps-when-sick.html    CDC - Ending Home Isolation: www.cdc.gov/coronavirus/2019-ncov/hcp/disposition-in-home-patients.html    CDC - Caring for Someone: www.cdc.gov/coronavirus/2019-ncov/if-you-are-sick/care-for-someone.html    [facility] - Interim Guidance for Hospital Discharge to Home: www.health.[facility].[place].us/diseases/coronavirus/hcp/hospdischarge.pdf    Below are the COVID-19 hotlines at the [facility] ([facility]). Interpreters are available.  ? For health questions: Call 162-949-4642 or 1-492.997.8666 (7 a.m. to 7 p.m.)  ? For questions about schools and childcare: Call 054-643-9250 or 1-432.486.4557 (7 a.m. to 7 p.m.)    For informational purposes only. Not to replace the advice of your health care provider. Clinically reviewed by Dr. Enmanuel Mcclendon.   Copyright   2020 [employer]. All rights reserved. [employer] 209275 - REV 01/05/21.    If you have been exposed to COVID-19 or test positive for COVID and are within 10 days  of the start of your symptoms, you may be eligible for Monoclonal antibody infusion via the [place] [employer] (MNRAP) system      [email or web address] - Providers, patients or someone helping may complete this screening tool. [facility] offers this lottery process to refer patients for COVID-19 monoclonal antibody therapeutic REGEN-COV (casirivimab and imdevimab) including post-exposure prophylaxis. Referrals are provisional, and final clinical judgment remains with the infusion site to determine eligibility and scheduling.      REGEN-COV is authorized to treat non-hospitalized patients with mild to moderate COVID-19 in adult and pediatric patients, age 12 and older, with positive results of direct SARS-CoV-2 viral testing, and who are at high risk for progression to severe COVID 19, including hospitalization or death. For more detail, refer to the Health Advisory: REGEN-COV Approved for Post-Exposure Prophylactic Use (PDF) and   Fact Sheet for Health Care Providers Emergency Use Authorization of REGEN-COV  (casirivimab and imdevimab) (PDF).      We are scheduling all people age 5 and older for COVID-19 vaccines (patients age 5-17 can only receive the Pfizer vaccine).    We are offering third doses of the Pfizer and Moderna COVID-19 vaccines to moderately and severely immunocompromised patients.     [facility] is offering booster doses of Covid-19 vaccination to anyone age 18 and up who got the Manish and Manish vaccine 2 or more months ago or Moderna COVID-19 vaccine or Pfizer COVID-19 vaccine 6 months or more ago.    If your initial vaccination was Manish & Manish, we recommend getting a Pfizer or Moderna second dose to maximize immunity.  If you received Moderna or Pfizer, you can schedule either Moderna or Pfizer for your booster dose based on convenience or personal preference.  To schedule any COVID-19 vaccination appointment for Moderna or Pfizer, please log in to TakeCare using  this using this link to see when and where we have openings.  Manish & Manish is only offered at our retail pharmacies (and they also offer Moderna and Pfizer) - please call your local [facility] to schedule with them.    If you have technical difficulty using [employer], call [phone], option 1 for assistance.    More information about vaccine effectiveness at reducing spread of disease, hospitalizations, and death as well as vaccine safety and answers to other questions can be found on our website: [email or web address].

## 2021-12-02 NOTE — PROGRESS NOTES
Halina is a 56 year old who is being evaluated via a billable video visit.      How would you like to obtain your AVS? MyChart  If the video visit is dropped, the invitation should be resent by: Text to cell phone: [phone]  Will anyone else be joining your video visit? No    Video Start Time: 2:52 PM    Assessment & Plan   Problem List Items Addressed This Visit     None      Visit Diagnoses     COVID-19 virus infection    -  Primary         Impression is likely mild COVID-19. Appears well and non-toxic and I have low suspicion for impending airway obstruction or respiratory distress.  She will push p.o. fluids, use over-the-counter meds for symptoms, and follow-up with us in 2 weeks if not improving or urgent care/the ER if symptoms worsen/change at any time. Given [facility] web site and she will look at this and fill out herself if she is interested.    DDx and Dx discussed with and explained to the pt to their satisfaction.  All questions were answered at this time. Pt expressed understanding of and agreement with this dx, tx, and plan. No further workup warranted and standard medication warnings given. I have given the patient a list of pertinent indications for re-evaluation. Will go to the Emergency Department if symptoms worsen or new concerning symptoms arise. Patient left the call in no apparent distress.     See Patient Instructions    Return in about 2 weeks (around 12/16/2021) for a recheck of your symptoms if not improving, or call 911/go to an ER anytime if worsening.    CIERRA Gunn  [facility] [place] Meade is a 56 year old who presents for the following health issues     HPI     Home nasal swab that was Pos yesterday per patient. Had one dose of COVID vaccine one month ago.   Concern for COVID-19  About how many days ago did these symptoms start? Started 11/30/21  Is this your first visit for this illness? Yes  In the 14 days before your symptoms  started, have you had close contact with someone with COVID-19 (Coronavirus)? Yes, I have been in contact with someone who has COVID-19/Coronavirus (confirmed by lab test).  Do you have a fever or chills? No  Are you having new or worsening difficulty breathing? No  Do you have new or worsening cough? Yes, I am coughing up mucus.  Have you had any new or unexplained body aches? YES    Have you experienced any of the following NEW symptoms?    Headache: YES    Sore throat: No    Loss of taste or smell: YES    Chest pain: No    Diarrhea: No    Rash: No  What treatments have you tried? Tylenol  Who do you live with? Son, daughter-in-law, sister, 2 grandsons  Are you, or a household member, a healthcare worker or a ? No  Do you live in a nursing home, group home, or shelter? No  Do you have a way to get food/medications if quarantined? Yes     Review of Systems   Constitutional, HEENT, cardiovascular, pulmonary, gi and gu systems are negative, except as otherwise noted.      Objective           Vitals:  No vitals were obtained today due to virtual visit.    Physical Exam   GENERAL: Healthy, alert and no distress  EYES: Eyes grossly normal to inspection.  No discharge or erythema, or obvious scleral/conjunctival abnormalities.  RESP: No audible wheeze, cough, or visible cyanosis.  No visible retractions or increased work of breathing.    SKIN: Visible skin clear. No significant rash, abnormal pigmentation or lesions.  NEURO: Cranial nerves grossly intact.  Mentation and speech appropriate for age.  PSYCH: Mentation appears normal, affect normal/bright, judgement and insight intact, normal speech and appearance well-groomed.     Video-Visit Details    Type of service:  Video Visit    Video End Time:3:16 PM    Originating Location (pt. Location): Home    Distant Location (provider location):  [facility] [place]     Platform used for Video Visit: [employer]

## 2021-12-02 NOTE — LETTER
December 2, 2021      Halina Ibrahim  [address] [address]        To Whom It May Concern:    Halina Ibrahim was seen by our clinic. She reports testing positive for COVID-19 with an at home test and I feel that this is consistent with her symptoms. She may return to remote work on 12/7/21. Please excuse her absence.      Sincerely,        CIERRA Gunn

## 2021-12-03 ENCOUNTER — HOME INFUSION (PRE-WILLOW HOME INFUSION) (OUTPATIENT)
Dept: PHARMACY | Facility: CLINIC | Age: 56
End: 2021-12-03
Payer: COMMERCIAL

## 2021-12-05 ENCOUNTER — HOME INFUSION (PRE-WILLOW HOME INFUSION) (OUTPATIENT)
Dept: PHARMACY | Facility: CLINIC | Age: 56
End: 2021-12-05
Payer: COMMERCIAL

## 2021-12-07 ENCOUNTER — VIRTUAL VISIT (OUTPATIENT)
Dept: SURGERY | Facility: CLINIC | Age: 56
End: 2021-12-07
Payer: COMMERCIAL

## 2021-12-07 DIAGNOSIS — Z71.3 NUTRITIONAL COUNSELING: ICD-10-CM

## 2021-12-07 DIAGNOSIS — Z98.84 BARIATRIC SURGERY STATUS: Primary | ICD-10-CM

## 2021-12-07 DIAGNOSIS — E66.01 OBESITY, CLASS III, BMI 40-49.9 (MORBID OBESITY) (H): ICD-10-CM

## 2021-12-07 PROCEDURE — 97803 MED NUTRITION INDIV SUBSEQ: CPT | Mod: TEL | Performed by: DIETITIAN, REGISTERED

## 2021-12-07 NOTE — PROGRESS NOTES
Halina Ibrahim is a 56 year old who is being evaluated via a billable video visit.      What phone number would you like to be contacted at? [phone]  How would you like to obtain your AVS? [facility]      Medical  Weight Loss Follow-Up Diet Evaluation  Assessment:  Halina is presenting today for a follow up weight management nutrition consultation. Pt has had an initial appointment with Dr. Glasgow  Weight loss medication: topamax.  Pt feels weight is likely stable- no new weight  BMI: There is no height or weight on file to calculate BMI.  Ideal body weight: 59.3 kg (130 lb 11.7 oz)  Adjusted ideal body weight: 85.8 kg (189 lb 3.8 oz)    Recommended Protein Intake: 60-80 grams of protein/day  Patient Active Problem List:  Patient Active Problem List   Diagnosis     Aortic valve disorder     S/P AVR     Restless leg syndrome     Progress on goals from last visit: had COVID last week- feeling a bit better now  +appetite is down since being sick- really focusing on eating protein    Dietary Recall:  Eating sporadically- italian wedding soup, hamburger, etc.  Beverages: drinking a lot more water lately    Nutrition Diagnosis:  Overweight/Obesity (NC 3.3) related to overeating and poor lifestyle habits as evidenced by patient's report of falling into old habits such as evening snacking, large portions, soda intake, inactivity  Not ready for diet/lifestyle change (NB 1.3) related to unsupported beliefs/attitudes about food, nutrition and nutrition-related topics as evidenced by patient's subjective statements, inability to meet goals previously set      Intervention:  1. Food and/or nutrient delivery: encouraged patient to work on eating three meals per day and keep hydration up as she recovers    Monitoring/Evaluation:    Goals:  1. Get back into old eating routine    Patient to follow up in  1 month(s) with DAVID        Phone call duration: 15 minutes    NICOLE TORIBIO RD

## 2021-12-07 NOTE — LETTER
12/7/2021         RE: Halina Ibrahim  [address] [address]        Dear Colleague,    Thank you for referring your patient, Halina Ibrahim, to the [facility] SURGERY CLINIC AND BARIATRICS CARE [place]. Please see a copy of my visit note below.    Halina Ibrahim is a 56 year old who is being evaluated via a billable video visit.      What phone number would you like to be contacted at? [phone]  How would you like to obtain your AVS? [facility]      Medical  Weight Loss Follow-Up Diet Evaluation  Assessment:  Halina is presenting today for a follow up weight management nutrition consultation. Pt has had an initial appointment with Dr. Glasgow  Weight loss medication: topamax.  Pt feels weight is likely stable- no new weight  BMI: There is no height or weight on file to calculate BMI.  Ideal body weight: 59.3 kg (130 lb 11.7 oz)  Adjusted ideal body weight: 85.8 kg (189 lb 3.8 oz)    Recommended Protein Intake: 60-80 grams of protein/day  Patient Active Problem List:  Patient Active Problem List   Diagnosis     Aortic valve disorder     S/P AVR     Restless leg syndrome     Progress on goals from last visit: had COVID last week- feeling a bit better now  +appetite is down since being sick- really focusing on eating protein    Dietary Recall:  Eating sporadically- italian wedding soup, hamburger, etc.  Beverages: drinking a lot more water lately    Nutrition Diagnosis:  Overweight/Obesity (NC 3.3) related to overeating and poor lifestyle habits as evidenced by patient's report of falling into old habits such as evening snacking, large portions, soda intake, inactivity  Not ready for diet/lifestyle change (NB 1.3) related to unsupported beliefs/attitudes about food, nutrition and nutrition-related topics as evidenced by patient's subjective statements, inability to meet goals previously set      Intervention:  1. Food and/or nutrient delivery: encouraged patient to work on eating three meals  per day and keep hydration up as she recovers    Monitoring/Evaluation:    Goals:  1. Get back into old eating routine    Patient to follow up in  1 month(s) with RD        Phone call duration: 15 minutes    NICOLE TORIBIO RD        Again, thank you for allowing me to participate in the care of your patient.        Sincerely,        NICOLE TORIBIO RD

## 2021-12-07 NOTE — PROGRESS NOTES
S/p RNY in 2004. Labs at [facility]. Orders signed by Dr. Glasgow.        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

## 2022-01-06 ENCOUNTER — VIRTUAL VISIT (OUTPATIENT)
Dept: SURGERY | Facility: CLINIC | Age: 57
End: 2022-01-06
Payer: COMMERCIAL

## 2022-01-06 VITALS — WEIGHT: 270 LBS | BODY MASS INDEX: 43.39 KG/M2 | HEIGHT: 66 IN

## 2022-01-06 DIAGNOSIS — Z98.84 BARIATRIC SURGERY STATUS: Primary | ICD-10-CM

## 2022-01-06 DIAGNOSIS — E66.01 OBESITY, CLASS III, BMI 40-49.9 (MORBID OBESITY) (H): ICD-10-CM

## 2022-01-06 DIAGNOSIS — Z71.3 NUTRITIONAL COUNSELING: ICD-10-CM

## 2022-01-06 PROCEDURE — 97803 MED NUTRITION INDIV SUBSEQ: CPT | Mod: TEL | Performed by: DIETITIAN, REGISTERED

## 2022-01-06 ASSESSMENT — MIFFLIN-ST. JEOR: SCORE: 1831.46

## 2022-01-06 NOTE — LETTER
1/6/2022         RE: Halina Ibrahim  [address] [facility] [address]        Dear Colleague,    Thank you for referring your patient, Halina Ibrahim, to the [facility] SURGERY CLINIC AND BARIATRICS CARE [place]. Please see a copy of my visit note below.    Halina Ibrahim is a 56 year old who is being evaluated via a billable telephone visit.      What phone number would you like to be contacted at? [phone]  How would you like to obtain your AVS? [facility]      Medical  Weight Loss Follow-Up Diet Evaluation  Assessment:  Halina is presenting today for a follow up weight management nutrition consultation. Pt has had an initial appointment with Dr. Glasgow  Weight loss medication: topamax.  Pt's weight is 270 lbs 0 oz  Initial weight: 296lb  Weight change: 26lb down   BMI: Body mass index is 43.58 kg/m .  Ideal body weight: 59.3 kg (130 lb 11.7 oz)  Adjusted ideal body weight: 85.8 kg (189 lb 3.8 oz)    Estimated RMR ([place]-St Jeor equation):   1835 kcals x 1.2 (sedentary) = 2202 kcals (for weight maintenance)  Recommended Protein Intake: 60-80 grams of protein/day  Patient Active Problem List:  Patient Active Problem List   Diagnosis     Aortic valve disorder     S/P AVR     Restless leg syndrome     Progress on goals from last visit: feels like she has gotten her appetite back, feeling a bit tired still  +states getting back to an eating routine has been hard because stores don't have the foods that she likes in stock  +struggles with finding quick meals for dinner    Dietary Recall:  Lunch: sliced gouda and salami  Beverages: water has improved    Nutrition Diagnosis:    Overweight/Obesity (NC 3.3) related to overeating and poor lifestyle habits as evidenced by patient's report of falling into old habits such as evening snacking, large portions, soda intake, inactivity, BMI 43.58  Not ready for diet/lifestyle change (NB 1.3) related to unsupported beliefs/attitudes about food, nutrition and  nutrition-related topics as evidenced by patient's subjective statements, inability to meet goals previously set      Intervention:  1. Food and/or nutrient delivery: discussed quick alternatives to dinner meals- 911 meals with an emphasis on protein  2. Nutrition counseling: goal setting    Monitoring/Evaluation:    Goals:  1. 15-20g protein  2. Continue with consistency with water    Patient to follow up in 1 month(s) with RD      Phone call duration: 20 minutes      NICOLE TORIBIO RD        Again, thank you for allowing me to participate in the care of your patient.        Sincerely,        NICOLE TORIBIO RD

## 2022-01-06 NOTE — PROGRESS NOTES
Halina Ibrahim is a 56 year old who is being evaluated via a billable telephone visit.      What phone number would you like to be contacted at? [phone]  How would you like to obtain your AVS? [facility]  Weight Loss Follow-Up Diet Evaluation  Assessment:  Halina is presenting today for a follow up weight management nutrition consultation. Pt has had an initial appointment with Dr. Glasgow  Weight loss medication: topamax.  Pt's weight is 270 lbs 0 oz  Initial weight: 296lb  Weight change: 26lb down   BMI: Body mass index is 43.58 kg/m .  Ideal body weight: 59.3 kg (130 lb 11.7 oz)  Adjusted ideal body weight: 85.8 kg (189 lb 3.8 oz)    Estimated RMR ([place]-St Jeor equation):   1835 kcals x 1.2 (sedentary) = 2202 kcals (for weight maintenance)  Recommended Protein Intake: 60-80 grams of protein/day  Patient Active Problem List:  Patient Active Problem List   Diagnosis     Aortic valve disorder     S/P AVR     Restless leg syndrome     Progress on goals from last visit: feels like she has gotten her appetite back, feeling a bit tired still  +states getting back to an eating routine has been hard because stores don't have the foods that she likes in stock  +struggles with finding quick meals for dinner    Dietary Recall:  Lunch: sliced gouda and salami  Beverages: water has improved    Nutrition Diagnosis:    Overweight/Obesity (NC 3.3) related to overeating and poor lifestyle habits as evidenced by patient's report of falling into old habits such as evening snacking, large portions, soda intake, inactivity, BMI 43.58  Not ready for diet/lifestyle change (NB 1.3) related to unsupported beliefs/attitudes about food, nutrition and nutrition-related topics as evidenced by patient's subjective statements, inability to meet goals previously set      Intervention:  1. Food and/or nutrient delivery: discussed quick alternatives to dinner meals- 911 meals with an emphasis on protein  2. Nutrition counseling:  goal setting    Monitoring/Evaluation:    Goals:  1. 15-20g protein  2. Continue with consistency with water    Patient to follow up in 1 month(s) with RD      Phone call duration: 20 minutes      NICOLE TORIBIO RD

## 2022-01-09 ENCOUNTER — HEALTH MAINTENANCE LETTER (OUTPATIENT)
Age: 57
End: 2022-01-09

## 2022-02-24 ENCOUNTER — VIRTUAL VISIT (OUTPATIENT)
Dept: SURGERY | Facility: CLINIC | Age: 57
End: 2022-02-24
Payer: COMMERCIAL

## 2022-02-24 VITALS — HEIGHT: 66 IN | WEIGHT: 270 LBS | BODY MASS INDEX: 43.39 KG/M2

## 2022-02-24 DIAGNOSIS — Z98.84 BARIATRIC SURGERY STATUS: Primary | ICD-10-CM

## 2022-02-24 DIAGNOSIS — E66.01 OBESITY, CLASS III, BMI 40-49.9 (MORBID OBESITY) (H): ICD-10-CM

## 2022-02-24 PROCEDURE — 97803 MED NUTRITION INDIV SUBSEQ: CPT | Mod: TEL | Performed by: DIETITIAN, REGISTERED

## 2022-02-24 NOTE — LETTER
2/24/2022         RE: Halina Ibrahim  [address] [facility] [address]        Dear Colleague,    Thank you for referring your patient, Halina Ibrahim, to the [facility] SURGERY CLINIC AND BARIATRICS CARE [place]. Please see a copy of my visit note below.    Halina Ibrahim is a 56 year old who is being evaluated via a billable telephone visit.      What phone number would you like to be contacted at? [phone]  How would you like to obtain your AVS? [facility]      Medical  Weight Loss Follow-Up Diet Evaluation  Assessment:  Halina is presenting today for a follow up weight management nutrition consultation. Pt has had an initial appointment with Dr. Glasgow  Weight loss medication: topamax.   Pt's weight is 270 lbs 0 oz  Initial weight: 296 lb  Weight change: down 26lb- no new weight per patient    BMI: Body mass index is 43.58 kg/m .  Ideal body weight: 59.3 kg (130 lb 11.7 oz)  Adjusted ideal body weight: 84.6 kg (186 lb 7 oz)    Recommended Protein Intake: 60-80 grams of protein/day  Patient Active Problem List:  Patient Active Problem List   Diagnosis     Aortic valve disorder     S/P AVR     Restless leg syndrome     Progress on goals from last visit: states she hasn't been quite on track   +finding things at the grocery store has been hard  +turkey breast chunks for sandwiches, meat cheese and cracker trays  +trying new ready to eat protein foods- enchiladas, meatloaf, etc.    Beverages: drinking a lot more water lately- about 80oz  +2 sodas per day  Nutrition Diagnosis:    Overweight/Obesity (NC 3.3) related to overeating and poor lifestyle habits as evidenced by patient's report of falling into old habits such as evening snacking, large portions, soda intake, inactivity    Not ready for diet/lifestyle change (NB 1.3) related to unsupported beliefs/attitudes about food, nutrition and nutrition-related topics as evidenced by patient's subjective statements, inability to meet goals previously  set      Intervention:  1. Nutrition counseling: support for continued success- patient not wishing to make any new goals at this time    Monitoring/Evaluation:    Goals:  1. Would like to continue focusing on protein     Patient to follow up in 2 month(s) with RD        Phone call duration: 21 minutes      NICOLE TORIBIO RD        Again, thank you for allowing me to participate in the care of your patient.        Sincerely,        NICOLE TORIBIO RD

## 2022-02-24 NOTE — PROGRESS NOTES
Halina Ibrahim is a 56 year old who is being evaluated via a billable telephone visit.      What phone number would you like to be contacted at? [phone]  How would you like to obtain your AVS? [facility]  Weight Loss Follow-Up Diet Evaluation  Assessment:  Halina is presenting today for a follow up weight management nutrition consultation. Pt has had an initial appointment with Dr. Glasgow  Weight loss medication: topamax.   Pt's weight is 270 lbs 0 oz  Initial weight: 296 lb  Weight change: down 26lb- no new weight per patient    BMI: Body mass index is 43.58 kg/m .  Ideal body weight: 59.3 kg (130 lb 11.7 oz)  Adjusted ideal body weight: 84.6 kg (186 lb 7 oz)    Recommended Protein Intake: 60-80 grams of protein/day  Patient Active Problem List:  Patient Active Problem List   Diagnosis     Aortic valve disorder     S/P AVR     Restless leg syndrome     Progress on goals from last visit: states she hasn't been quite on track   +finding things at the grocery store has been hard  +turkey breast chunks for sandwiches, meat cheese and cracker trays  +trying new ready to eat protein foods- enchiladas, meatloaf, etc.    Beverages: drinking a lot more water lately- about 80oz  +2 sodas per day  Nutrition Diagnosis:    Overweight/Obesity (NC 3.3) related to overeating and poor lifestyle habits as evidenced by patient's report of falling into old habits such as evening snacking, large portions, soda intake, inactivity    Not ready for diet/lifestyle change (NB 1.3) related to unsupported beliefs/attitudes about food, nutrition and nutrition-related topics as evidenced by patient's subjective statements, inability to meet goals previously set      Intervention:  1. Nutrition counseling: support for continued success- patient not wishing to make any new goals at this time    Monitoring/Evaluation:    Goals:  1. Would like to continue focusing on protein     Patient to follow up in 2 month(s) with  RD        Phone call duration: 21 minutes      NICOLE TORIBIO RD

## 2022-03-10 NOTE — PROGRESS NOTES
This is a recent snapshot of the patient's [facility] medical record.  For current drug dose and complete information and questions, call [phone] or In Basket pool, fv home infusion ([id number])  CSN Number:  [id number]       184.9

## 2022-03-10 NOTE — PROGRESS NOTES
This is a recent snapshot of the patient's [facility] medical record.  For current drug dose and complete information and questions, call [phone] or In Basket pool, fv home infusion ([id number])  CSN Number:  [id number]

## 2022-03-21 DIAGNOSIS — E66.01 OBESITY, CLASS III, BMI 40-49.9 (MORBID OBESITY) (H): ICD-10-CM

## 2022-03-21 RX ORDER — TOPIRAMATE 50 MG/1
TABLET, FILM COATED ORAL
Qty: 180 TABLET | Refills: 0 | Status: SHIPPED | OUTPATIENT
Start: 2022-03-21 | End: 2022-06-16

## 2022-04-06 NOTE — PROGRESS NOTES
This is a recent snapshot of the patient's [facility] medical record.  For current drug dose and complete information and questions, call [phone] or In Basket pool, fv home infusion ([id number])  CSN Number:  [id number]

## 2022-04-06 NOTE — PROGRESS NOTES
This is a recent snapshot of the patient's [facility] medical record.  For current drug dose and complete information and questions, call [phone] or In Basket pool, fv home infusion ([id number])  CSN Number:  [id number]

## 2022-04-21 ENCOUNTER — VIRTUAL VISIT (OUTPATIENT)
Dept: SURGERY | Facility: CLINIC | Age: 57
End: 2022-04-21
Payer: COMMERCIAL

## 2022-04-21 VITALS — WEIGHT: 271 LBS | BODY MASS INDEX: 43.55 KG/M2 | HEIGHT: 66 IN

## 2022-04-21 DIAGNOSIS — Z98.84 BARIATRIC SURGERY STATUS: Primary | ICD-10-CM

## 2022-04-21 DIAGNOSIS — E66.01 OBESITY, CLASS III, BMI 40-49.9 (MORBID OBESITY) (H): ICD-10-CM

## 2022-04-21 PROCEDURE — 97803 MED NUTRITION INDIV SUBSEQ: CPT | Mod: TEL | Performed by: DIETITIAN, REGISTERED

## 2022-04-21 NOTE — LETTER
4/21/2022         RE: aHlina Ibrahim  [address] [address]        Dear Colleague,    Thank you for referring your patient, Halina Ibrahim, to the [facility] SURGERY CLINIC AND BARIATRICS CARE [place]. Please see a copy of my visit note below.    Halina Ibrahim is a 56 year old who is being evaluated via a billable telephone visit.      What phone number would you like to be contacted at? [phone]  How would you like to obtain your AVS? [facility]      Medical  Weight Loss Follow-Up Diet Evaluation  Assessment:  Halina is presenting today for a follow up weight management nutrition consultation. Pt has had an initial appointment with Dr. Glasgow  Weight loss medication: topamax.   Pt's weight is 271 lbs 0 oz  Initial weight: 296lb  Weight change: down 25lb    BMI: Body mass index is 43.74 kg/m .  Ideal body weight: 59.3 kg (130 lb 11.7 oz)  Adjusted ideal body weight: 84.6 kg (186 lb 7 oz)    Recommended Protein Intake: 60-80 grams of protein/day  Patient Active Problem List:  Patient Active Problem List   Diagnosis     Aortic valve disorder     S/P AVR     Restless leg syndrome     Progress on goals from last visit: States she hasn't changed much of anything. Struggling to find the foods that she wants  +working on getting protein that she needs- turkey sandwiches, slices of cheese  +struggling with appetite at dinner    Dietary Recall:  Dinner:prepared meal from [employer]  Beverages: water intake has not been quite as good lately- states she has been busy  Drinking Pepsi    Nutrition Diagnosis:  Overweight/Obesity (NC 3.3) related to overeating and poor lifestyle habits as evidenced by patient's report of falling into old habits such as evening snacking, large portions, soda intake, inactivity     Not ready for diet/lifestyle change (NB 1.3) related to unsupported beliefs/attitudes about food, nutrition and nutrition-related topics as evidenced by patient's subjective statements, inability  to meet goals previously set    Intervention:  1. Food and/or nutrient delivery: discussed quitting soda- patient prefers to go cold turkey, so we talked about how to manage this  2. Nutrition counseling: support for success    Monitoring/Evaluation:    Goals:  1. Work on eliminating pepsi    Patient to follow up in 1 month(s) with RD      Phone call duration: 20 minutes      NICOLE TORIBIO RD        Again, thank you for allowing me to participate in the care of your patient.        Sincerely,        NICOLE TORIBIO RD

## 2022-04-21 NOTE — PROGRESS NOTES
Halina Ibrahim is a 56 year old who is being evaluated via a billable telephone visit.      What phone number would you like to be contacted at? [phone]  How would you like to obtain your AVS? [facility]      Medical  Weight Loss Follow-Up Diet Evaluation  Assessment:  Halina is presenting today for a follow up weight management nutrition consultation. Pt has had an initial appointment with Dr. Glasgow  Weight loss medication: topamax.   Pt's weight is 271 lbs 0 oz  Initial weight: 296lb  Weight change: down 25lb    BMI: Body mass index is 43.74 kg/m .  Ideal body weight: 59.3 kg (130 lb 11.7 oz)  Adjusted ideal body weight: 84.6 kg (186 lb 7 oz)    Recommended Protein Intake: 60-80 grams of protein/day  Patient Active Problem List:  Patient Active Problem List   Diagnosis     Aortic valve disorder     S/P AVR     Restless leg syndrome     Progress on goals from last visit: States she hasn't changed much of anything. Struggling to find the foods that she wants  +working on getting protein that she needs- turkey sandwiches, slices of cheese  +struggling with appetite at dinner    Dietary Recall:  Dinner:prepared meal from [employer]  Beverages: water intake has not been quite as good lately- states she has been busy  Drinking Pepsi    Nutrition Diagnosis:  Overweight/Obesity (NC 3.3) related to overeating and poor lifestyle habits as evidenced by patient's report of falling into old habits such as evening snacking, large portions, soda intake, inactivity     Not ready for diet/lifestyle change (NB 1.3) related to unsupported beliefs/attitudes about food, nutrition and nutrition-related topics as evidenced by patient's subjective statements, inability to meet goals previously set    Intervention:  1. Food and/or nutrient delivery: discussed quitting soda- patient prefers to go cold turkey, so we talked about how to manage this  2. Nutrition counseling: support for success    Monitoring/Evaluation:    Goals:  1. Work on  eliminating pepsi    Patient to follow up in 1 month(s) with RD      Phone call duration: 20 minutes      NICOLE TORIBIO RD

## 2022-05-26 ENCOUNTER — VIRTUAL VISIT (OUTPATIENT)
Dept: SURGERY | Facility: CLINIC | Age: 57
End: 2022-05-26
Payer: COMMERCIAL

## 2022-05-26 DIAGNOSIS — E66.01 OBESITY, CLASS III, BMI 40-49.9 (MORBID OBESITY) (H): ICD-10-CM

## 2022-05-26 DIAGNOSIS — Z98.84 BARIATRIC SURGERY STATUS: Primary | ICD-10-CM

## 2022-05-26 PROCEDURE — 97803 MED NUTRITION INDIV SUBSEQ: CPT | Mod: TEL | Performed by: DIETITIAN, REGISTERED

## 2022-05-26 NOTE — PROGRESS NOTES
Halina Ibrahim is a 56 year old who is being evaluated via a billable telephone visit.      What phone number would you like to be contacted at? [phone]  How would you like to obtain your AVS? [facility]        Medical  Weight Loss Follow-Up Diet Evaluation  Assessment:  Halina is presenting today for a follow up weight management nutrition consultation. Pt has had an initial appointment with Dr. Glasgow  Weight loss medication: topamax.  No new weight for patient today    Recommended Protein Intake: 60-80 grams of protein/day  Patient Active Problem List:  Patient Active Problem List   Diagnosis     Aortic valve disorder     S/P AVR     Restless leg syndrome     Progress on goals from last visit: states everything is going well, hoping to weigh self next week  +decreasing soda is going ok- drinking 1 coke zero during days she is working  -states she is really tired and low energy- taking all bariatric vitamins    Dietary Recall:  Breakfast: bagel and cream cheese  Lunch: whopper with cheese from [employer]- this is pretty rare  Dinner:gluten free mac and cheese  sometimes not feeling hungry at dinner- might be eating a later lunch  Typical snacks: sunflower seeds  Beverages: 3-4 cans pepsi in the past week and a half  Exercise: activity has increased a little bit    Nutrition Diagnosis:    Overweight/Obesity (NC 3.3) related to overeating and poor lifestyle habits as evidenced by patient's report of falling into old habits such as evening snacking, large portions, soda intake, inactivity     Not ready for diet/lifestyle change (NB 1.3) related to unsupported beliefs/attitudes about food, nutrition and nutrition-related topics as evidenced by patient's subjective statements, inability to meet goals previously set      Intervention:  1. Food and/or nutrient delivery: encouraged patient to maximize energy and make a meal or move when she is most energetic  2. Nutrition counseling: support for  success    Monitoring/Evaluation:    Goals:  1. Eliminate pepsi  2. Consistent three meals per day    Patient to follow up in 4 month(s) with bariatrician and 2 month(s) with RD        Phone call duration: 22 minutes      NICOLE TORIBIO RD

## 2022-05-26 NOTE — LETTER
5/26/2022         RE: Halina Ibrahim  [address] [facility] [address] [facility] [address]        Dear Colleague,    Thank you for referring your patient, Halina Ibrahim, to the [facility] SURGERY CLINIC AND BARIATRICS CARE [place]. Please see a copy of my visit note below.    Halina Ibrahim is a 56 year old who is being evaluated via a billable telephone visit.      What phone number would you like to be contacted at? [phone]  How would you like to obtain your AVS? [facility]        Medical  Weight Loss Follow-Up Diet Evaluation  Assessment:  Halina is presenting today for a follow up weight management nutrition consultation. Pt has had an initial appointment with Dr. Glasgow  Weight loss medication: topamax.  No new weight for patient today    Recommended Protein Intake: 60-80 grams of protein/day  Patient Active Problem List:  Patient Active Problem List   Diagnosis     Aortic valve disorder     S/P AVR     Restless leg syndrome     Progress on goals from last visit: states everything is going well, hoping to weigh self next week  +decreasing soda is going ok- drinking 1 coke zero during days she is working  -states she is really tired and low energy- taking all bariatric vitamins    Dietary Recall:  Breakfast: bagel and cream cheese  Lunch: whopper with cheese from [employer] and fri[employer]- this is pretty rare  Dinner:gluten free mac and cheese  sometimes not feeling hungry at dinner- might be eating a later lunch  Typical snacks: sunflower seeds  Beverages: 3-4 cans pepsi in the past week and a half  Exercise: activity has increased a little bit    Nutrition Diagnosis:    Overweight/Obesity (NC 3.3) related to overeating and poor lifestyle habits as evidenced by patient's report of falling into old habits such as evening snacking, large portions, soda intake, inactivity     Not ready for diet/lifestyle change (NB 1.3) related to unsupported beliefs/attitudes about food, nutrition and nutrition-related topics as  evidenced by patient's subjective statements, inability to meet goals previously set      Intervention:  1. Food and/or nutrient delivery: encouraged patient to maximize energy and make a meal or move when she is most energetic  2. Nutrition counseling: support for success    Monitoring/Evaluation:    Goals:  1. Eliminate pepsi  2. Consistent three meals per day    Patient to follow up in 4 month(s) with bariatrician and 2 month(s) with RD        Phone call duration: 22 minutes      NICOLE TORIBIO RD        Again, thank you for allowing me to participate in the care of your patient.        Sincerely,        NICOLE TORIBIO RD

## 2022-07-14 ENCOUNTER — MYC MEDICAL ADVICE (OUTPATIENT)
Dept: SURGERY | Facility: CLINIC | Age: 57
End: 2022-07-14

## 2022-07-14 DIAGNOSIS — R79.89 ELEVATED FERRITIN: ICD-10-CM

## 2022-07-14 DIAGNOSIS — E21.3 HYPERPARATHYROIDISM (H): Primary | ICD-10-CM

## 2022-07-14 DIAGNOSIS — Z98.84 BARIATRIC SURGERY STATUS: ICD-10-CM

## 2022-07-14 DIAGNOSIS — K91.2 POSTOPERATIVE MALABSORPTION: ICD-10-CM

## 2022-07-14 DIAGNOSIS — R79.89 LOW VITAMIN D LEVEL: ICD-10-CM

## 2022-07-21 ENCOUNTER — VIRTUAL VISIT (OUTPATIENT)
Dept: SURGERY | Facility: CLINIC | Age: 57
End: 2022-07-21
Payer: COMMERCIAL

## 2022-07-21 VITALS — WEIGHT: 271.6 LBS | BODY MASS INDEX: 43.65 KG/M2 | HEIGHT: 66 IN

## 2022-07-21 DIAGNOSIS — E66.01 OBESITY, CLASS III, BMI 40-49.9 (MORBID OBESITY) (H): Primary | ICD-10-CM

## 2022-07-21 DIAGNOSIS — Z98.84 BARIATRIC SURGERY STATUS: ICD-10-CM

## 2022-07-21 PROCEDURE — 97803 MED NUTRITION INDIV SUBSEQ: CPT | Mod: TEL | Performed by: DIETITIAN, REGISTERED

## 2022-07-21 NOTE — LETTER
7/21/2022         RE: Halina Ibrahim  [address] [address]        Dear Colleague,    Thank you for referring your patient, Halina Ibrahim, to the [facility] SURGERY CLINIC AND BARIATRICS CARE [place]. Please see a copy of my visit note below.    Halina Ibrahim is a 56 year old who is being evaluated via a billable telephone visit.      What phone number would you like to be contacted at? [phone]  How would you like to obtain your AVS? [facility]      Medical  Weight Loss Follow-Up Diet Evaluation  Assessment:  Halina is presenting today for a follow up weight management nutrition consultation. Pt has had an initial appointment with Dr. Glasgow  Weight loss medication: topamax.  Pt's weight is 271 lbs 9.6 oz  Initial weight: 296 lb  Weight change: down 25lb    BMI: Body mass index is 43.84 kg/m .  Patient weight not recorded  Recommended Protein Intake: 60-80 grams of protein/day  Patient Active Problem List:  Patient Active Problem List   Diagnosis     Aortic valve disorder     S/P AVR     Restless leg syndrome     Progress on goals from last visit: feel like not much has changed- eating as much protein as she can, adding in variety  +would like to try to continue to lose weight but is dealing with some sleep issues- made adjustments on CPAP  Beverages: still working on decreasing soda  Nutrition Diagnosis:    Overweight/Obesity (NC 3.3) related to overeating and poor lifestyle habits as evidenced by patient's report of falling into old habits such as evening snacking, large portions, soda intake, inactivity     Not ready for diet/lifestyle change (NB 1.3) related to unsupported beliefs/attitudes about food, nutrition and nutrition-related topics as evidenced by patient's subjective statements, inability to meet goals previously set    Intervention:  1. Food and/or nutrient delivery: consistency with diet, work on getting a regular bedtime  2. Nutrition counseling: support for  continued success    Monitoring/Evaluation:    Goals:  1. Maintain habits while working on sleep adjustments      Patient to follow up in 2 month(s) with bariatrician and 1 month(s) with RD        Phone call duration: 25 minutes    NICOLE TORIBIO RD        Again, thank you for allowing me to participate in the care of your patient.        Sincerely,        NICOLE TORIBIO RD

## 2022-07-21 NOTE — PROGRESS NOTES
Halina Ibrahim is a 56 year old who is being evaluated via a billable telephone visit.      What phone number would you like to be contacted at? [phone]  How would you like to obtain your AVS? [facility]      Medical  Weight Loss Follow-Up Diet Evaluation  Assessment:  Halina is presenting today for a follow up weight management nutrition consultation. Pt has had an initial appointment with Dr. Glasgow  Weight loss medication: topamax.  Pt's weight is 271 lbs 9.6 oz  Initial weight: 296 lb  Weight change: down 25lb    BMI: Body mass index is 43.84 kg/m .  Patient weight not recorded  Recommended Protein Intake: 60-80 grams of protein/day  Patient Active Problem List:  Patient Active Problem List   Diagnosis     Aortic valve disorder     S/P AVR     Restless leg syndrome     Progress on goals from last visit: feel like not much has changed- eating as much protein as she can, adding in variety  +would like to try to continue to lose weight but is dealing with some sleep issues- made adjustments on CPAP  Beverages: still working on decreasing soda  Nutrition Diagnosis:    Overweight/Obesity (NC 3.3) related to overeating and poor lifestyle habits as evidenced by patient's report of falling into old habits such as evening snacking, large portions, soda intake, inactivity     Not ready for diet/lifestyle change (NB 1.3) related to unsupported beliefs/attitudes about food, nutrition and nutrition-related topics as evidenced by patient's subjective statements, inability to meet goals previously set    Intervention:  1. Food and/or nutrient delivery: consistency with diet, work on getting a regular bedtime  2. Nutrition counseling: support for continued success    Monitoring/Evaluation:    Goals:  1. Maintain habits while working on sleep adjustments      Patient to follow up in 2 month(s) with bariatrician and 1 month(s) with RD        Phone call duration: 25 minutes    NICOLE TORIBIO RD

## 2022-08-25 ENCOUNTER — VIRTUAL VISIT (OUTPATIENT)
Dept: SURGERY | Facility: CLINIC | Age: 57
End: 2022-08-25
Payer: COMMERCIAL

## 2022-08-25 DIAGNOSIS — Z98.84 BARIATRIC SURGERY STATUS: ICD-10-CM

## 2022-08-25 DIAGNOSIS — E66.01 OBESITY, CLASS III, BMI 40-49.9 (MORBID OBESITY) (H): Primary | ICD-10-CM

## 2022-08-25 PROCEDURE — 97803 MED NUTRITION INDIV SUBSEQ: CPT | Mod: TEL | Performed by: DIETITIAN, REGISTERED

## 2022-08-25 NOTE — LETTER
8/25/2022         RE: Halina Ibrahim  [address] [address]        Dear Colleague,    Thank you for referring your patient, Halina Ibrahim, to the [facility] SURGERY CLINIC AND BARIATRICS CARE [place]. Please see a copy of my visit note below.    Halina Ibrahim is a 56 year old who is being evaluated via a billable telephone visit.      What phone number would you like to be contacted at? [phone]  How would you like to obtain your AVS? [facility]    Medical  Weight Loss Follow-Up Diet Evaluation  Assessment:  Halina is presenting today for a follow up weight management nutrition consultation. Pt has had an initial appointment with Dr. Glasgow   Weight loss medication: topamax.   Pt's weight is 271.5lb- no new weight per patient  Initial weight: 296lb  Weight change: down 25lb    BMI: 43.84  Ideal body weight: 59.3 kg (130 lb 11.7 oz)  Adjusted ideal body weight: 84.9 kg (187 lb 1.3 oz)    Estimated RMR ([place]-St Jeor equation):   1840 kcals x 1.2 (sedentary) = 2208 kcals (for weight maintenance)  Recommended Protein Intake: 60-80 grams of protein/day  Patient Active Problem List:  Patient Active Problem List   Diagnosis     Aortic valve disorder     S/P AVR     Restless leg syndrome     Progress on goals from last visit: states she is still working on sleep, not quite getting to bed when she would like to   -struggles with eating dinner- states she is not always hungry    Dietary Recall:  Breakfast: bagel   Lunch:12p- turkey sandwich  Dinner:5-6p- 1/2 calzone with pork sausage  Nutrition Diagnosis:  Overweight/Obesity (NC 3.3) related to overeating and poor lifestyle habits as evidenced by patient's report of falling into old habits such as evening snacking, large portions, soda intake, inactivity     Not ready for diet/lifestyle change (NB 1.3) related to unsupported beliefs/attitudes about food, nutrition and nutrition-related topics as evidenced by patient's subjective statements,  inability to meet goals previously set      Intervention:  1. Food and/or nutrient delivery: discussed continuing to focus on protein with each meal, portion sizes, eating a few bites of something even when not hungry in the evening  2. Nutrition counseling: support for continued success    Monitoring/Evaluation:    Goals:  1. Work toward a consistent bedtime    Patient to follow up in 1 month(s) with bariatrician and 2 month(s) with RD        Phone call duration: 25 minutes    NICOLE TORIBIO RD        Again, thank you for allowing me to participate in the care of your patient.        Sincerely,        NICOLE TORIBIO RD

## 2022-08-25 NOTE — PROGRESS NOTES
Halina Ibrahim is a 56 year old who is being evaluated via a billable telephone visit.      What phone number would you like to be contacted at? [phone]  How would you like to obtain your AVS? [facility]    Medical  Weight Loss Follow-Up Diet Evaluation  Assessment:  Halina is presenting today for a follow up weight management nutrition consultation. Pt has had an initial appointment with Dr. Glasgow   Weight loss medication: topamax.   Pt's weight is 271.5lb- no new weight per patient  Initial weight: 296lb  Weight change: down 25lb    BMI: 43.84  Ideal body weight: 59.3 kg (130 lb 11.7 oz)  Adjusted ideal body weight: 84.9 kg (187 lb 1.3 oz)    Estimated RMR ([place]-St Jeor equation):   1840 kcals x 1.2 (sedentary) = 2208 kcals (for weight maintenance)  Recommended Protein Intake: 60-80 grams of protein/day  Patient Active Problem List:  Patient Active Problem List   Diagnosis     Aortic valve disorder     S/P AVR     Restless leg syndrome     Progress on goals from last visit: states she is still working on sleep, not quite getting to bed when she would like to   -struggles with eating dinner- states she is not always hungry    Dietary Recall:  Breakfast: bagel   Lunch:12p- turkey sandwich  Dinner:5-6p- 1/2 calzone with pork sausage  Nutrition Diagnosis:  Overweight/Obesity (NC 3.3) related to overeating and poor lifestyle habits as evidenced by patient's report of falling into old habits such as evening snacking, large portions, soda intake, inactivity     Not ready for diet/lifestyle change (NB 1.3) related to unsupported beliefs/attitudes about food, nutrition and nutrition-related topics as evidenced by patient's subjective statements, inability to meet goals previously set      Intervention:  1. Food and/or nutrient delivery: discussed continuing to focus on protein with each meal, portion sizes, eating a few bites of something even when not hungry in the evening  2. Nutrition counseling: support for  continued success    Monitoring/Evaluation:    Goals:  1. Work toward a consistent bedtime    Patient to follow up in 1 month(s) with bariatrician and 2 month(s) with RD        Phone call duration: 25 minutes    NICOLE TORIBIO RD

## 2022-08-30 ENCOUNTER — LAB (OUTPATIENT)
Dept: LAB | Facility: CLINIC | Age: 57
End: 2022-08-30
Payer: COMMERCIAL

## 2022-08-30 DIAGNOSIS — Z98.84 BARIATRIC SURGERY STATUS: ICD-10-CM

## 2022-08-30 DIAGNOSIS — E21.3 HYPERPARATHYROIDISM (H): ICD-10-CM

## 2022-08-30 DIAGNOSIS — R79.89 ELEVATED FERRITIN: ICD-10-CM

## 2022-08-30 DIAGNOSIS — R79.89 LOW VITAMIN D LEVEL: ICD-10-CM

## 2022-08-30 DIAGNOSIS — K91.2 POSTOPERATIVE MALABSORPTION: ICD-10-CM

## 2022-08-30 LAB
ALBUMIN SERPL BCG-MCNC: 3.9 G/DL (ref 3.5–5.2)
ALP SERPL-CCNC: 100 U/L (ref 35–104)
ALT SERPL W P-5'-P-CCNC: 32 U/L (ref 10–35)
ANION GAP SERPL CALCULATED.3IONS-SCNC: 6 MMOL/L (ref 7–15)
AST SERPL W P-5'-P-CCNC: 41 U/L (ref 10–35)
BILIRUB SERPL-MCNC: 0.3 MG/DL
BUN SERPL-MCNC: 12.8 MG/DL (ref 6–20)
CALCIUM SERPL-MCNC: 9 MG/DL (ref 8.6–10)
CHLORIDE SERPL-SCNC: 109 MMOL/L (ref 98–107)
CHOLEST SERPL-MCNC: 185 MG/DL
CREAT SERPL-MCNC: 1.05 MG/DL (ref 0.51–0.95)
DEPRECATED HCO3 PLAS-SCNC: 27 MMOL/L (ref 22–29)
ERYTHROCYTE [DISTWIDTH] IN BLOOD BY AUTOMATED COUNT: 14.2 % (ref 10–15)
FERRITIN SERPL-MCNC: 376 NG/ML (ref 11–328)
FOLATE SERPL-MCNC: 39.9 NG/ML (ref 4.6–34.8)
GFR SERPL CREATININE-BSD FRML MDRD: 62 ML/MIN/1.73M2
GLUCOSE SERPL-MCNC: 90 MG/DL (ref 70–99)
HBA1C MFR BLD: 5.5 % (ref 0–5.6)
HCT VFR BLD AUTO: 40.2 % (ref 35–47)
HDLC SERPL-MCNC: 47 MG/DL
HGB BLD-MCNC: 12.7 G/DL (ref 11.7–15.7)
LDLC SERPL CALC-MCNC: 104 MG/DL
MCH RBC QN AUTO: 27.5 PG (ref 26.5–33)
MCHC RBC AUTO-ENTMCNC: 31.6 G/DL (ref 31.5–36.5)
MCV RBC AUTO: 87 FL (ref 78–100)
NONHDLC SERPL-MCNC: 138 MG/DL
PLATELET # BLD AUTO: 215 10E3/UL (ref 150–450)
POTASSIUM SERPL-SCNC: 4.3 MMOL/L (ref 3.4–5.3)
PROT SERPL-MCNC: 6.5 G/DL (ref 6.4–8.3)
PTH-INTACT SERPL-MCNC: 43 PG/ML (ref 15–65)
RBC # BLD AUTO: 4.62 10E6/UL (ref 3.8–5.2)
SODIUM SERPL-SCNC: 142 MMOL/L (ref 136–145)
TRIGL SERPL-MCNC: 172 MG/DL
VIT B12 SERPL-MCNC: 1035 PG/ML (ref 232–1245)
WBC # BLD AUTO: 7.2 10E3/UL (ref 4–11)

## 2022-08-30 PROCEDURE — 82607 VITAMIN B-12: CPT

## 2022-08-30 PROCEDURE — 99000 SPECIMEN HANDLING OFFICE-LAB: CPT

## 2022-08-30 PROCEDURE — 80053 COMPREHEN METABOLIC PANEL: CPT

## 2022-08-30 PROCEDURE — 84425 ASSAY OF VITAMIN B-1: CPT | Mod: 90

## 2022-08-30 PROCEDURE — 80061 LIPID PANEL: CPT

## 2022-08-30 PROCEDURE — 84590 ASSAY OF VITAMIN A: CPT | Mod: 90

## 2022-08-30 PROCEDURE — 83036 HEMOGLOBIN GLYCOSYLATED A1C: CPT

## 2022-08-30 PROCEDURE — 85027 COMPLETE CBC AUTOMATED: CPT

## 2022-08-30 PROCEDURE — 83970 ASSAY OF PARATHORMONE: CPT

## 2022-08-30 PROCEDURE — 82306 VITAMIN D 25 HYDROXY: CPT

## 2022-08-30 PROCEDURE — 82728 ASSAY OF FERRITIN: CPT

## 2022-08-30 PROCEDURE — 82746 ASSAY OF FOLIC ACID SERUM: CPT

## 2022-08-30 PROCEDURE — 84630 ASSAY OF ZINC: CPT | Mod: 90

## 2022-08-30 PROCEDURE — 36415 COLL VENOUS BLD VENIPUNCTURE: CPT

## 2022-09-01 LAB — ZINC SERPL-MCNC: 87.9 UG/DL

## 2022-09-02 LAB
ANNOTATION COMMENT IMP: NORMAL
RETINYL PALMITATE SERPL-MCNC: 0.03 MG/L
VIT A SERPL-MCNC: 0.57 MG/L
VIT B1 PYROPHOSHATE BLD-SCNC: 205 NMOL/L

## 2022-09-10 LAB
DEPRECATED CALCIDIOL+CALCIFEROL SERPL-MC: <101 UG/L (ref 20–75)
VITAMIN D2 SERPL-MCNC: <5 UG/L
VITAMIN D3 SERPL-MCNC: 96 UG/L

## 2022-09-13 DIAGNOSIS — E66.01 OBESITY, CLASS III, BMI 40-49.9 (MORBID OBESITY) (H): ICD-10-CM

## 2022-09-13 RX ORDER — TOPIRAMATE 50 MG/1
TABLET, FILM COATED ORAL
Qty: 180 TABLET | Refills: 0 | Status: SHIPPED | OUTPATIENT
Start: 2022-09-13 | End: 2022-09-21

## 2022-09-20 NOTE — PROGRESS NOTES
Bariatric Care Clinic Follow Up Visit for Previous Bariatric Surgery   Date of visit: 9/26/2022  Physician: DURAN Glasgow MD, MD  Primary Care is Lu Carrillo  Halina Ibrahim   56 year old  female    Date of Surgery: 9/14/2004  Initial Weight: 296#    Today's Weight:   Wt Readings from Last 1 Encounters:   09/26/22 122.9 kg (271 lb)     Body mass index is 43.74 kg/m .       Assessment and Plan   Assessment: Halina is a 56 year old year old female who is 18 years s/p RNY gastric bypass with Dr. Macario. They have had a durable weight loss of 25 lbs since surgery.  Overall compliance with the [facility] Bariatric Surgery Program has been excellent.      Plan:    1. Postoperative malabsorption  Patient is taking all vitamins as directed.  Recent routine postoperative labs were reviewed. Iron saturation was added as her ferritin was elevated. Vitamin D level was also elevated. . We will plan to stay off vitamin D and stop additional iron. We will recheck labs in November. She was reminded to slow down, chew foods thoroughly, and to avoid liquids within 30 minutes of solids. Written information was given. She was congratulated on her success thus far.    2. Morbid obesity (H)  Patient was congratulated on her success thus far. Healthy habits to assist with further weight loss were discussed. She will start doing some exercise. She will continue the topamax.    3. History of migraine headaches  This may improve with healthy habits and weight loss.        Total time spent on the date of this encounter doing: chart review, review of test results, patient visit, physical exam, education, counseling, developing plan of care and documenting = 43 minutes.      Bariatric Surgery Review   Interim History/LifeChanges: patient is taking topamax and she thinks it is helping to control her appetite. She denies side effects.     Patient Concerns: She was surprised her vitamin D level was high    GERD no    Medication  changes: no recent    Vitamin Intake:   Multivitamin   2 per day, and additional iron   Vitamin D  stopped in August, was taking 20,000 international unit(s) per day   Calcium  citrate   Vit. B-12    B complex     Habits:            Alcohol Intake  none   NSAID Use  yes   Caffeine Use  pepsi 0, 1 can per day   Exercise  none, motivation   CPAP Use:  na   Birth Control  menopause   Tobacco Use     no                                      LABS: reviewed      LABS:  Lab Results   Component Value Date    WBC 7.2 08/30/2022    HGB 12.7 08/30/2022    HCT 40.2 08/30/2022    MCV 87 08/30/2022     08/30/2022      No components found for: [id number] Lab Results   Component Value Date    HGBA1C 5.6 09/06/2019      Lab Results   Component Value Date    CHOL 185 08/30/2022    No components found for: PTH      No components found for: FERRITIN   Lab Results   Component Value Date    HDL 47 (L) 08/30/2022      No components found for: [id number] No components found for: 53230   No components found for: LDLCALC No results found for: TSH No components found for: FOLATE   Lab Results   Component Value Date    TRIG 172 (H) 08/30/2022    Lab Results   Component Value Date    ALT 32 08/30/2022    AST 41 (H) 08/30/2022    ALKPHOS 100 08/30/2022    No results found for: TESTOSTERONE     No results found for: CHOLHDL No components found for: 7597   @[facility](vitamin a: 1)@             Patient Profile   Social History     Social History Narrative     Not on file        Past Medical History   Past Medical History:   Diagnosis Date     Aortic regurgitation      Aortic valve replaced      Degenerative disc disease      Dyslipidemia      Hashimoto's disease      History of Sabrina-en-Y gastric bypass 9/14/2004    Dr. Macario Highest weight 296#     Hypertension      Low back pain      Metabolic syndrome      Mitral valve replaced      Morbid obesity (H)      Morbid obesity with BMI of 40.0-44.9, adult (H)      Other and unspecified  "postsurgical nonabsorption      S/P gastric bypass      S/P MVR (mitral valve replacement)      Stroke (H)      Stroke (H)     from mass near mitral valve     Patient Active Problem List   Diagnosis     Aortic valve disorder     S/P AVR     Restless leg syndrome     [unfilled]    Past Surgical History  She has a past surgical history that includes s/p mitral valve replacement[; s/p gastric bypass[; Redo sternotomy replace valve aortic (3/14/2013); tubal ligation; other surgical history; Revision Sabrina-En-Y; aortic valve replacement; and Replace valve mitral.     Examination   Ht 1.676 m (5' 6\")   Wt 122.9 kg (271 lb)   BMI 43.74 kg/m      General: alert and oriented in NAD         Counseling:   We reviewed the important post op bariatric recommendations:  -eating 3 meals daily  -eating protein first, getting >60gm protein daily  -eating slowly, chewing food well  -avoiding/limiting calorie containing beverages  -drinking water 15-30 minutes before or after meals  -choosing wheat, not white with breads, crackers, pastas, madelyn, bagels, tortillas, rice  -limiting restaurant or cafeteria eating to twice a week or less    We discussed the importance of restorative sleep and stress management in maintaining a healthy weight.  We discussed the National Weight Control Registry healthy weight maintenance strategies and ways to optimize metabolism.  We discussed the importance of physical activity including cardiovascular and strength training in maintaining a healthier weight.  We discussed the importance of life-long vitamin supplementation and life-long  follow-up.    Halina was reminded that, to avoid marginal ulcers she should avoid tobacco at all, alcohol in excess, caffeine in excess, and NSAIDS (unless indicated for cardioprotection or othewise and opposed by a PPI).    DURAN Glasgow MD  [facility] Bariatric clinic  9/20/2022  2:42 PM            No images are attached to the encounter.    "

## 2022-09-21 DIAGNOSIS — E66.01 OBESITY, CLASS III, BMI 40-49.9 (MORBID OBESITY) (H): ICD-10-CM

## 2022-09-21 RX ORDER — TOPIRAMATE 50 MG/1
TABLET, FILM COATED ORAL
Qty: 180 TABLET | Refills: 0 | Status: SHIPPED | OUTPATIENT
Start: 2022-09-21 | End: 2022-12-19

## 2022-09-26 ENCOUNTER — VIRTUAL VISIT (OUTPATIENT)
Dept: SURGERY | Facility: CLINIC | Age: 57
End: 2022-09-26
Payer: COMMERCIAL

## 2022-09-26 VITALS — BODY MASS INDEX: 43.55 KG/M2 | WEIGHT: 271 LBS | HEIGHT: 66 IN

## 2022-09-26 DIAGNOSIS — Z86.69 HISTORY OF MIGRAINE HEADACHES: ICD-10-CM

## 2022-09-26 DIAGNOSIS — E66.01 MORBID OBESITY (H): ICD-10-CM

## 2022-09-26 DIAGNOSIS — K91.2 POSTOPERATIVE MALABSORPTION: Primary | ICD-10-CM

## 2022-09-26 PROCEDURE — 99215 OFFICE O/P EST HI 40 MIN: CPT | Mod: TEL | Performed by: FAMILY MEDICINE

## 2022-09-26 RX ORDER — LEVOTHYROXINE SODIUM 150 UG/1
150 TABLET ORAL
COMMUNITY
Start: 2022-09-09 | End: 2023-06-26

## 2022-09-26 RX ORDER — GABAPENTIN 100 MG/1
CAPSULE ORAL
COMMUNITY
Start: 2021-11-05

## 2022-09-26 RX ORDER — LOSARTAN POTASSIUM 100 MG/1
100 TABLET ORAL
COMMUNITY
Start: 2022-09-09

## 2022-09-26 NOTE — PROGRESS NOTES
Halina Ibrahim is 56 year old  female who presents for a billable video visit today.- switched to telephone due to technical difficulties    How would you like to obtain your AVS? MyChart  If dropped from the video visit, the video invitation should be resent by: Text to cell phone: [phone]  Will anyone else be joining your video visit? No      Telephone Time: 28 minutes    Are there any specific questions or needs that you would like addressed at your visit today? No      Originating Location (pt. Location): Home    Distant Location (provider location):  [facility] SURGERY CLINIC AND BARIATRICS CARE [place]

## 2022-09-26 NOTE — LETTER
9/26/2022         RE: Halina Ibrahim  [address] [facility] [address]        Dear Colleague,    Thank you for referring your patient, Halina Ibrahim, to the [facility] SURGERY CLINIC AND BARIATRICS CARE [place]. Please see a copy of my visit note below.          Bariatric Care Clinic Follow Up Visit for Previous Bariatric Surgery   Date of visit: 9/26/2022  Physician: DURAN Glasgow MD, MD  Primary Care is Lu Carrillo  Halina Ibrahim   56 year old  female    Date of Surgery: 9/14/2004  Initial Weight: 296#    Today's Weight:   Wt Readings from Last 1 Encounters:   09/26/22 122.9 kg (271 lb)     Body mass index is 43.74 kg/m .       Assessment and Plan   Assessment: Halina is a 56 year old year old female who is 18 years s/p RNY gastric bypass with Dr. Macario. They have had a durable weight loss of 25 lbs since surgery.  Overall compliance with the [facility] Bariatric Surgery Program has been excellent.      Plan:    1. Postoperative malabsorption  Patient is taking all vitamins as directed.  Recent routine postoperative labs were reviewed. Iron saturation was added as her ferritin was elevated. Vitamin D level was also elevated. . We will plan to stay off vitamin D and stop additional iron. We will recheck labs in November. She was reminded to slow down, chew foods thoroughly, and to avoid liquids within 30 minutes of solids. Written information was given. She was congratulated on her success thus far.    2. Morbid obesity (H)  Patient was congratulated on her success thus far. Healthy habits to assist with further weight loss were discussed. She will start doing some exercise. She will continue the topamax.    3. History of migraine headaches  This may improve with healthy habits and weight loss.        Total time spent on the date of this encounter doing: chart review, review of test results, patient visit, physical exam, education, counseling, developing plan of care and documenting =  43 minutes.      Bariatric Surgery Review   Interim History/LifeChanges: patient is taking topamax and she thinks it is helping to control her appetite. She denies side effects.     Patient Concerns: She was surprised her vitamin D level was high    GERD no    Medication changes: no recent    Vitamin Intake:   Multivitamin   2 per day, and additional iron   Vitamin D  stopped in August, was taking 20,000 international unit(s) per day   Calcium  citrate   Vit. B-12    B complex     Habits:            Alcohol Intake  none   NSAID Use  yes   Caffeine Use  pepsi 0, 1 can per day   Exercise  none, motivation   CPAP Use:  na   Birth Control  menopause   Tobacco Use     no                                      LABS: reviewed      LABS:  Lab Results   Component Value Date    WBC 7.2 08/30/2022    HGB 12.7 08/30/2022    HCT 40.2 08/30/2022    MCV 87 08/30/2022     08/30/2022      No components found for: [id number] Lab Results   Component Value Date    HGBA1C 5.6 09/06/2019      Lab Results   Component Value Date    CHOL 185 08/30/2022    No components found for: PTH      No components found for: FERRITIN   Lab Results   Component Value Date    HDL 47 (L) 08/30/2022      No components found for: [id number] No components found for: 90453   No components found for: LDLCALC No results found for: TSH No components found for: FOLATE   Lab Results   Component Value Date    TRIG 172 (H) 08/30/2022    Lab Results   Component Value Date    ALT 32 08/30/2022    AST 41 (H) 08/30/2022    ALKPHOS 100 08/30/2022    No results found for: TESTOSTERONE     No results found for: CHOLHDL No components found for: 7597   @[facility](vitamin a: 1)@             Patient Profile   Social History     Social History Narrative     Not on file        Past Medical History   Past Medical History:   Diagnosis Date     Aortic regurgitation      Aortic valve replaced      Degenerative disc disease      Dyslipidemia      Hashimoto's disease       "History of Sabrina-en-Y gastric bypass 9/14/2004    Dr. Macario Highest weight 296#     Hypertension      Low back pain      Metabolic syndrome      Mitral valve replaced      Morbid obesity (H)      Morbid obesity with BMI of 40.0-44.9, adult (H)      Other and unspecified postsurgical nonabsorption      S/P gastric bypass      S/P MVR (mitral valve replacement)      Stroke (H)      Stroke (H)     from mass near mitral valve     Patient Active Problem List   Diagnosis     Aortic valve disorder     S/P AVR     Restless leg syndrome     [unfilled]    Past Surgical History  She has a past surgical history that includes s/p mitral valve replacement[; s/p gastric bypass[; Redo sternotomy replace valve aortic (3/14/2013); tubal ligation; other surgical history; Revision Sabrina-En-Y; aortic valve replacement; and Replace valve mitral.     Examination   Ht 1.676 m (5' 6\")   Wt 122.9 kg (271 lb)   BMI 43.74 kg/m      General: alert and oriented in NAD         Counseling:   We reviewed the important post op bariatric recommendations:  -eating 3 meals daily  -eating protein first, getting >60gm protein daily  -eating slowly, chewing food well  -avoiding/limiting calorie containing beverages  -drinking water 15-30 minutes before or after meals  -choosing wheat, not white with breads, crackers, pastas, madelyn, bagels, tortillas, rice  -limiting restaurant or cafeteria eating to twice a week or less    We discussed the importance of restorative sleep and stress management in maintaining a healthy weight.  We discussed the National Weight Control Registry healthy weight maintenance strategies and ways to optimize metabolism.  We discussed the importance of physical activity including cardiovascular and strength training in maintaining a healthier weight.  We discussed the importance of life-long vitamin supplementation and life-long  follow-up.    Halina was reminded that, to avoid marginal ulcers she should avoid tobacco at " all, alcohol in excess, caffeine in excess, and NSAIDS (unless indicated for cardioprotection or othewise and opposed by a PPI).    DURAN Glasgow MD  [facility] Bariatric clinic  9/20/2022  2:42 PM            No images are attached to the encounter.      Halina Ibrahim is 56 year old  female who presents for a billable video visit today.- switched to telephone due to technical difficulties    How would you like to obtain your AVS? MyChart  If dropped from the video visit, the video invitation should be resent by: Text to cell phone: [phone]  Will anyone else be joining your video visit? No      Telephone Time: 28 minutes    Are there any specific questions or needs that you would like addressed at your visit today? No      Originating Location (pt. Location): Home    Distant Location (provider location):  [facility] SURGERY CLINIC AND BARIATRICS CARE [place]         Again, thank you for allowing me to participate in the care of your patient.        Sincerely,        DURAN Glasgow MD

## 2022-10-26 ENCOUNTER — VIRTUAL VISIT (OUTPATIENT)
Dept: SURGERY | Facility: CLINIC | Age: 57
End: 2022-10-26
Payer: COMMERCIAL

## 2022-10-26 DIAGNOSIS — Z98.84 BARIATRIC SURGERY STATUS: ICD-10-CM

## 2022-10-26 DIAGNOSIS — E66.01 OBESITY, CLASS III, BMI 40-49.9 (MORBID OBESITY) (H): ICD-10-CM

## 2022-10-26 DIAGNOSIS — Z71.3 NUTRITIONAL COUNSELING: ICD-10-CM

## 2022-10-26 DIAGNOSIS — K91.2 POSTOPERATIVE MALABSORPTION: Primary | ICD-10-CM

## 2022-10-26 PROCEDURE — 97803 MED NUTRITION INDIV SUBSEQ: CPT | Mod: TEL | Performed by: DIETITIAN, REGISTERED

## 2022-10-26 NOTE — LETTER
10/26/2022         RE: Halina Ibrahim  [address] [facility] [address]        Dear Colleague,    Thank you for referring your patient, Halina Ibrahim, to the [facility] SURGERY CLINIC AND BARIATRICS CARE [place]. Please see a copy of my visit note below.    Halina Ibrahim is a 57 year old who is being evaluated via a billable telephone/video visit.      What phone number would you like to be contacted at? [phone]  How would you like to obtain your AVS? [facility]      Medical  Weight Loss Follow-Up Diet Evaluation  Assessment:  Halina is presenting today for a follow up weight management nutrition consultation. Pt has had an initial appointment with Dr. Glasgow.  Weight loss medication: Topamax.   Pt's weight is 271 lbs (last recorded weight)-no new weight  Initial weight: 296 lbs  Weight change: 25 lbs (down)     No flowsheet data found.  BMI: There is no height or weight on file to calculate BMI.  Ideal body weight: 59.3 kg (130 lb 11.7 oz)  Adjusted ideal body weight: 84.7 kg (186 lb 13.4 oz)    Estimated RMR ([place]-St Jeor equation):   1836 kcals x 1.3 (light active) = 2387 kcals (for weight maintenance)     Recommended Protein Intake: 60-80 grams of protein/day  Patient Active Problem List:  Patient Active Problem List   Diagnosis     Aortic valve disorder     S/P AVR     Restless leg syndrome       Progress on goals from last visit: Establish Bedtime-hasn't been going very well due to being sick (sinus infection), hoping to get back on track with things.    Dietary Recall:  Breakfast: bagel with cream cheese or fruit   Lunch:varies-turkey with swiss cheese sandwich or 2 corn dogs or mac and cheese  Dinner:varies-similar to lunch or pre made meals from the grocery store or madelyn pocket with meat and cheese or [employer] chicken wings or steak or boneless pork chop with mashed potatoes  Typical snacks: not typically-on occasion Balanced Breaks or chips   Beverages: Pepsi or Coke Zero (would  like to eventually work on eliminating/reducing once she gets the sleep situation figured out), water  Exercise: no set regimen; gardening in the summer months, up and moving during the day     Nutrition Diagnosis:    Overweight/Obesity (NC 3.3) related to overeating and poor lifestyle habits as evidenced by patient's subjective statements and BMI 43.74 kg/m2.     Intervention:  1. Food and/or nutrient delivery: balanced meals, adequate protein   2. Nutrition education: none-provided support and encouragement     Monitoring/Evaluation:    Goals:  1. Work towards a consistent bedtime.   2. Work on reducing/eliminating pop once bedtime is consistent.    Patient to follow up in 2 month(s) with bariatrician and 3 month(s) with RD        Phone call duration: 21 minutes          Distant Location (provider location):  Off-site      Krystle Ballard RD            Again, thank you for allowing me to participate in the care of your patient.        Sincerely,        Krystle Ballard RD

## 2022-10-26 NOTE — PROGRESS NOTES
Halina Ibrahim is a 57 year old who is being evaluated via a billable telephone/video visit.      What phone number would you like to be contacted at? [phone]  How would you like to obtain your AVS? [facility]      Medical  Weight Loss Follow-Up Diet Evaluation  Assessment:  Halina is presenting today for a follow up weight management nutrition consultation. Pt has had an initial appointment with Dr. Glasgow.  Weight loss medication: Topamax.   Pt's weight is 271 lbs (last recorded weight)-no new weight  Initial weight: 296 lbs  Weight change: 25 lbs (down)     No flowsheet data found.  BMI: There is no height or weight on file to calculate BMI.  Ideal body weight: 59.3 kg (130 lb 11.7 oz)  Adjusted ideal body weight: 84.7 kg (186 lb 13.4 oz)    Estimated RMR ([place]-St Jeor equation):   1836 kcals x 1.3 (light active) = 2387 kcals (for weight maintenance)     Recommended Protein Intake: 60-80 grams of protein/day  Patient Active Problem List:  Patient Active Problem List   Diagnosis     Aortic valve disorder     S/P AVR     Restless leg syndrome       Progress on goals from last visit: Establish Bedtime-hasn't been going very well due to being sick (sinus infection), hoping to get back on track with things.    Dietary Recall:  Breakfast: bagel with cream cheese or fruit   Lunch:varies-turkey with swiss cheese sandwich or 2 corn dogs or mac and cheese  Dinner:varies-similar to lunch or pre made meals from the grocery store or madelyn pocket with meat and cheese or Honey BBQ chicken wings or steak or boneless pork chop with mashed potatoes  Typical snacks: not typically-on occasion Balanced Breaks or chips   Beverages: Pepsi or Coke Zero (would like to eventually work on eliminating/reducing once she gets the sleep situation figured out), water  Exercise: no set regimen; gardening in the summer months, up and moving during the day     Nutrition Diagnosis:    Overweight/Obesity (NC 3.3) related to overeating and poor  lifestyle habits as evidenced by patient's subjective statements and BMI 43.74 kg/m2.     Intervention:  1. Food and/or nutrient delivery: balanced meals, adequate protein   2. Nutrition education: none-provided support and encouragement     Monitoring/Evaluation:    Goals:  1. Work towards a consistent bedtime.   2. Work on reducing/eliminating pop once bedtime is consistent.    Patient to follow up in 2 month(s) with bariatrician and 3 month(s) with RD        Phone call duration: 21 minutes          Distant Location (provider location):  Off-site      Krystle Ballard RD

## 2022-11-21 ENCOUNTER — HEALTH MAINTENANCE LETTER (OUTPATIENT)
Age: 57
End: 2022-11-21

## 2022-11-25 ENCOUNTER — LAB (OUTPATIENT)
Dept: LAB | Facility: CLINIC | Age: 57
End: 2022-11-25
Payer: COMMERCIAL

## 2022-11-25 DIAGNOSIS — K91.2 POSTOPERATIVE MALABSORPTION: ICD-10-CM

## 2022-11-25 LAB
ALBUMIN SERPL BCG-MCNC: 4 G/DL (ref 3.5–5.2)
ALP SERPL-CCNC: 94 U/L (ref 35–104)
ALT SERPL W P-5'-P-CCNC: 31 U/L (ref 10–35)
ANION GAP SERPL CALCULATED.3IONS-SCNC: 8 MMOL/L (ref 7–15)
AST SERPL W P-5'-P-CCNC: 40 U/L (ref 10–35)
BILIRUB SERPL-MCNC: 0.2 MG/DL
BUN SERPL-MCNC: 14.3 MG/DL (ref 6–20)
CALCIUM SERPL-MCNC: 9.3 MG/DL (ref 8.6–10)
CHLORIDE SERPL-SCNC: 111 MMOL/L (ref 98–107)
CREAT SERPL-MCNC: 0.99 MG/DL (ref 0.51–0.95)
DEPRECATED HCO3 PLAS-SCNC: 27 MMOL/L (ref 22–29)
GFR SERPL CREATININE-BSD FRML MDRD: 66 ML/MIN/1.73M2
GLUCOSE SERPL-MCNC: 92 MG/DL (ref 70–99)
IRON BINDING CAPACITY (ROCHE): 255 UG/DL (ref 240–430)
IRON SATN MFR SERPL: 36 % (ref 15–46)
IRON SERPL-MCNC: 93 UG/DL (ref 37–145)
POTASSIUM SERPL-SCNC: 4.6 MMOL/L (ref 3.4–5.3)
PROT SERPL-MCNC: 6.6 G/DL (ref 6.4–8.3)
SODIUM SERPL-SCNC: 146 MMOL/L (ref 136–145)

## 2022-11-25 PROCEDURE — 82306 VITAMIN D 25 HYDROXY: CPT

## 2022-11-25 PROCEDURE — 83550 IRON BINDING TEST: CPT

## 2022-11-25 PROCEDURE — 80053 COMPREHEN METABOLIC PANEL: CPT

## 2022-11-25 PROCEDURE — 83540 ASSAY OF IRON: CPT

## 2022-11-25 PROCEDURE — 36415 COLL VENOUS BLD VENIPUNCTURE: CPT

## 2022-11-27 LAB — DEPRECATED CALCIDIOL+CALCIFEROL SERPL-MC: 71 UG/L (ref 20–75)

## 2022-12-11 NOTE — PROGRESS NOTES
Bariatric Care Clinic Non Surgical Follow up Visit   Date of visit: 12/19/2022  Physician: DURAN Glasgow MD, MD  Primary Care is Lu Carrillo  Halina Ibrahim   57 year old  female     Initial Weight: 296# prior to RNY on 9/14/2004  Today's Weight:   Wt Readings from Last 1 Encounters:   12/19/22 119.5 kg (263 lb 6.4 oz)     Body mass index is 42.51 kg/m .           Assessment and Plan   Assessment: Halina is a 57 year old year old female who presents for medical weight management.      Plan:    1. Morbid obesity (H)  Patient was congratulated on her success thus far. Healthy habits to assist with further weight loss were discussed. She will continue the topamax. We discussed the patient's co-morbid conditions including migraine headaches. These likely will improve with healthy habits and weight loss.    2. History of migraine headaches  This may improve with healthy habits and weight loss.      Follow up in 3 months with myself and in 1 month with the dietician           INTERIM HISTORY  Patient is taking topamax and thinks that it is helping to control her appetite.    Goals set with dietician 10/26/22:  1. Work towards a consistent bedtime. Not met  2. Work on reducing/eliminating pop once bedtime is consistent. Not met    DIETARY HISTORY  Meals Per Day: 3  Eating Protein First?: sometimes  Food Diary: B:bagel with cream cheese L:turkey and cheese sandwich or chicken chuy wrap or Honey BBQ chicken nuggets D:protein and starch  Snacks Per Day: decreased  Fluid Intake: water 56 oz per day, pepsi 0 1 can per day  Portion Control: improved sometimes  Calorie Containing Beverages: sugared pepsi, up to 2 cans per day  Typical Protein Food Choices: chicken nuggets, turkey, cheese, cream cheese  Choosing Whole Grains: usually  Meals at Restaurant per week:rare    Positive Changes Since Last Visit: eating less overall, decreased sugared soda  Struggling With: vegetable intake (she says she never has eaten them),  "sister's mental health, taking care of her sister's dog    Knowledgeable in Reading Food Labels: not doing it  Getting Adequate Protein: usually  Sleeping 7-8 hours/day : yes      PHYSICAL ACTIVITY PATTERNS:  Walks around the yard with the dog, shoveling    REVIEW OF SYSTEMS  GENERAL/CONSTITUTIONAL:  Fatigue: yes  CARDIOVASCULAR:  History of heart disease: yes  PULMONARY:  Dyspnea on exertion: yes  PSYCHIATRIC:  Moods: stable  MUSCULOSKELETAL/RHEUMATOLOGIC  Arthralgias: yes  Myalgias: yes  ENDOCRINE:  Monitoring Blood Sugars: no  Sugars Well Controlled: na  No personal or family history of medullary thyroid cancer not discussed  :  Birth control: menopause       Patient Profile   Social History     Social History Narrative     Not on file        Past Medical History   Past Medical History:   Diagnosis Date     Aortic regurgitation      Aortic valve replaced      Degenerative disc disease      Dyslipidemia      Hashimoto's disease      History of Sabrina-en-Y gastric bypass 9/14/2004    Dr. Macario Highest weight 296#     Hypertension      Low back pain      Metabolic syndrome      Mitral valve replaced      Morbid obesity (H)      Morbid obesity with BMI of 40.0-44.9, adult (H)      Other and unspecified postsurgical nonabsorption      S/P gastric bypass      S/P MVR (mitral valve replacement)      Stroke (H)      Stroke (H)     from mass near mitral valve     Patient Active Problem List   Diagnosis     Aortic valve disorder     S/P AVR     Restless leg syndrome       Past Surgical History  She has a past surgical history that includes s/p mitral valve replacement[; s/p gastric bypass[; Redo sternotomy replace valve aortic (3/14/2013); tubal ligation; other surgical history; Revision Sabrina-En-Y; aortic valve replacement; and Replace valve mitral.     Examination   Ht 1.676 m (5' 6\")   Wt 119.5 kg (263 lb 6.4 oz)   BMI 42.51 kg/m    General:  Alert and ambulatory, NAD  Psych: Sounds stable         Counseling:   We " reviewed the important post op bariatric recommendations:  -eating 3 meals daily  -eating protein first, getting >60gm protein daily  -eating slowly, chewing food well  -avoiding/limiting calorie containing beverages  -limiting starchy vegetables and carbohydrates, choosing wheat, not white with breads,   crackers, pastas, madelyn, bagels, tortillas, rice  -limiting restaurant or cafeteria eating to twice a week or less    We discussed the importance of restorative sleep and stress management in maintaining a healthy weight.  We discussed the National Weight Control Registry healthy weight maintenance strategies and ways to optimize metabolism.  We discussed the importance of physical activity including cardiovascular and strength training in maintaining a healthier weight.    Total time spent on the date of this encounter doing: chart review, review of test results, patient visit, physical exam, education, counseling, developing plan of care and documenting = 36 minutes.     Halina Ibrahim is 57 year old  female who presents for a billable video visit today.    How would you like to obtain your AVS? MyChart  If dropped from the video visit, the video invitation should be resent by: Text to cell phone: [phone]  Will anyone else be joining your video visit? No      Video Start Time: 3:15 PM - switched to telephone due to technical difficulties  Are there any specific questions or needs that you would like addressed at your visit today? Wants to talk about test results      TelephonevVisit Details    Type of service:  Telephone Visit    Platform used for Video Visit: [email or web address]    Total telephone time: 24 mintues         Distant Location (provider location): Off-site    Distant Location (provider location):  [facility] SWAPNA Glasgow MD  [facility]

## 2022-12-19 ENCOUNTER — VIRTUAL VISIT (OUTPATIENT)
Dept: SURGERY | Facility: CLINIC | Age: 57
End: 2022-12-19
Payer: COMMERCIAL

## 2022-12-19 VITALS — BODY MASS INDEX: 42.33 KG/M2 | HEIGHT: 66 IN | WEIGHT: 263.4 LBS

## 2022-12-19 DIAGNOSIS — K91.2 POSTOPERATIVE MALABSORPTION: ICD-10-CM

## 2022-12-19 DIAGNOSIS — E66.01 MORBID OBESITY (H): Primary | ICD-10-CM

## 2022-12-19 DIAGNOSIS — E66.01 OBESITY, CLASS III, BMI 40-49.9 (MORBID OBESITY) (H): ICD-10-CM

## 2022-12-19 DIAGNOSIS — Z86.69 HISTORY OF MIGRAINE HEADACHES: ICD-10-CM

## 2022-12-19 PROCEDURE — 99214 OFFICE O/P EST MOD 30 MIN: CPT | Mod: 95 | Performed by: FAMILY MEDICINE

## 2022-12-19 RX ORDER — TOPIRAMATE 50 MG/1
TABLET, FILM COATED ORAL
Qty: 180 TABLET | Refills: 0 | Status: SHIPPED | OUTPATIENT
Start: 2022-12-19 | End: 2023-03-20

## 2022-12-19 NOTE — LETTER
12/19/2022         RE: Halina Ibrahim  [address] [place] [address]        Dear Colleague,    Thank you for referring your patient, Halina Ibrahim, to the [facility] [place]. Please see a copy of my visit note below.    Bariatric Care Clinic Non Surgical Follow up Visit   Date of visit: 12/19/2022  Physician: DURAN Glasgow MD, MD  Primary Care is Lu Carrillo  Halina Ibrahim   57 year old  female     Initial Weight: 296# prior to RNY on 9/14/2004  Today's Weight:   Wt Readings from Last 1 Encounters:   12/19/22 119.5 kg (263 lb 6.4 oz)     Body mass index is 42.51 kg/m .           Assessment and Plan   Assessment: Halina is a 57 year old year old female who presents for medical weight management.      Plan:    1. Morbid obesity (H)  Patient was congratulated on her success thus far. Healthy habits to assist with further weight loss were discussed. She will continue the topamax. We discussed the patient's co-morbid conditions including migraine headaches. These likely will improve with healthy habits and weight loss.    2. History of migraine headaches  This may improve with healthy habits and weight loss.      Follow up in 3 months with myself and in 1 month with the dietician           INTERIM HISTORY  Patient is taking topamax and thinks that it is helping to control her appetite.    Goals set with dietician 10/26/22:  1. Work towards a consistent bedtime. Not met  2. Work on reducing/eliminating pop once bedtime is consistent. Not met    DIETARY HISTORY  Meals Per Day: 3  Eating Protein First?: sometimes  Food Diary: B:bagel with cream cheese L:turkey and cheese sandwich or chicken chuy wrap or Honey BBQ chicken nuggets D:protein and starch  Snacks Per Day: decreased  Fluid Intake: water 56 oz per day, pepsi 0 1 can per day  Portion Control: improved sometimes  Calorie Containing Beverages: sugared pepsi, up to 2 cans per day  Typical Protein Food  Choices: chicken nuggets, turkey, cheese, cream cheese  Choosing Whole Grains: usually  Meals at Restaurant per week:rare    Positive Changes Since Last Visit: eating less overall, decreased sugared soda  Struggling With: vegetable intake (she says she never has eaten them), sister's mental health, taking care of her sister's dog    Knowledgeable in Reading Food Labels: not doing it  Getting Adequate Protein: usually  Sleeping 7-8 hours/day : yes      PHYSICAL ACTIVITY PATTERNS:  Walks around the yard with the dog, shoveling    REVIEW OF SYSTEMS  GENERAL/CONSTITUTIONAL:  Fatigue: yes  CARDIOVASCULAR:  History of heart disease: yes  PULMONARY:  Dyspnea on exertion: yes  PSYCHIATRIC:  Moods: stable  MUSCULOSKELETAL/RHEUMATOLOGIC  Arthralgias: yes  Myalgias: yes  ENDOCRINE:  Monitoring Blood Sugars: no  Sugars Well Controlled: na  No personal or family history of medullary thyroid cancer not discussed  :  Birth control: menopause       Patient Profile   Social History     Social History Narrative     Not on file        Past Medical History   Past Medical History:   Diagnosis Date     Aortic regurgitation      Aortic valve replaced      Degenerative disc disease      Dyslipidemia      Hashimoto's disease      History of Sabrina-en-Y gastric bypass 9/14/2004    Dr. Macario Highest weight 296#     Hypertension      Low back pain      Metabolic syndrome      Mitral valve replaced      Morbid obesity (H)      Morbid obesity with BMI of 40.0-44.9, adult (H)      Other and unspecified postsurgical nonabsorption      S/P gastric bypass      S/P MVR (mitral valve replacement)      Stroke (H)      Stroke (H)     from mass near mitral valve     Patient Active Problem List   Diagnosis     Aortic valve disorder     S/P AVR     Restless leg syndrome       Past Surgical History  She has a past surgical history that includes s/p mitral valve replacement[; s/p gastric bypass[; Redo sternotomy replace valve aortic (3/14/2013); tubal  "ligation; other surgical history; Revision Sabrina-En-Y; aortic valve replacement; and Replace valve mitral.     Examination   Ht 1.676 m (5' 6\")   Wt 119.5 kg (263 lb 6.4 oz)   BMI 42.51 kg/m    General:  Alert and ambulatory, NAD  Psych: Sounds stable         Counseling:   We reviewed the important post op bariatric recommendations:  -eating 3 meals daily  -eating protein first, getting >60gm protein daily  -eating slowly, chewing food well  -avoiding/limiting calorie containing beverages  -limiting starchy vegetables and carbohydrates, choosing wheat, not white with breads,   crackers, pastas, madelyn, bagels, tortillas, rice  -limiting restaurant or cafeteria eating to twice a week or less    We discussed the importance of restorative sleep and stress management in maintaining a healthy weight.  We discussed the National Weight Control Registry healthy weight maintenance strategies and ways to optimize metabolism.  We discussed the importance of physical activity including cardiovascular and strength training in maintaining a healthier weight.    Total time spent on the date of this encounter doing: chart review, review of test results, patient visit, physical exam, education, counseling, developing plan of care and documenting = 36 minutes.     Halina Ibrahim is 57 year old  female who presents for a billable video visit today.    How would you like to obtain your AVS? MyChart  If dropped from the video visit, the video invitation should be resent by: Text to cell phone: [phone]  Will anyone else be joining your video visit? No      Video Start Time: 3:15 PM - switched to telephone due to technical difficulties  Are there any specific questions or needs that you would like addressed at your visit today? Wants to talk about test results      TelephonevVisit Details    Type of service:  Telephone Visit    [facility]    Total telephone time: 24 mintues         Distant Location " (provider location): Off-site    Distant Location (provider location):  [facility] [place]       DURAN Glasgow MD  [facility] [place] [facility]             Again, thank you for allowing me to participate in the care of your patient.        Sincerely,        DURAN Glasgow MD

## 2023-01-04 ENCOUNTER — VIRTUAL VISIT (OUTPATIENT)
Dept: SURGERY | Facility: CLINIC | Age: 58
End: 2023-01-04
Payer: COMMERCIAL

## 2023-01-04 DIAGNOSIS — Z71.3 NUTRITIONAL COUNSELING: ICD-10-CM

## 2023-01-04 DIAGNOSIS — K91.2 POSTOPERATIVE MALABSORPTION: ICD-10-CM

## 2023-01-04 DIAGNOSIS — E66.01 OBESITY, CLASS III, BMI 40-49.9 (MORBID OBESITY) (H): ICD-10-CM

## 2023-01-04 DIAGNOSIS — Z98.84 BARIATRIC SURGERY STATUS: Primary | ICD-10-CM

## 2023-01-04 PROCEDURE — 97803 MED NUTRITION INDIV SUBSEQ: CPT | Mod: TEL | Performed by: DIETITIAN, REGISTERED

## 2023-01-04 NOTE — LETTER
1/4/2023         RE: Halina Ibrahim  [address] [facility] [place] [address]        Dear Colleague,    Thank you for referring your patient, Halina Ibrahim, to the [facility] [place]. Please see a copy of my visit note below.    Halina Ibrahim is a 57 year old who is being evaluated via a billable telephone visit.      What phone number would you like to be contacted at? [phone]  How would you like to obtain your AVS? [facility]  Weight Loss Follow-Up Diet Evaluation  Assessment:  Halina is presenting today for a follow up weight management nutrition consultation. Pt has had an initial appointment with Dr. Glasgow.   Weight loss medication: Topamax.   Pt's weight is 263.4 lbs   Initial weight: 296 lbs   Weight change: 32.6 lbs (down)    No flowsheet data found.  BMI: There is no height or weight on file to calculate BMI.  Ideal body weight: 59.3 kg (130 lb 11.7 oz)  Adjusted ideal body weight: 83.4 kg (183 lb 12.8 oz)    Estimated RMR ([place]-St Jeor equation):   1801 kcals x 1.3 (light active) = 2341 kcals (for weight maintenance)     Recommended Protein Intake: 60-80 grams of protein/day  Patient Active Problem List:  Patient Active Problem List   Diagnosis     Aortic valve disorder     S/P AVR     Restless leg syndrome     Morbid obesity (H)     Progress on goals from last visit: Hasn't had much success with establishing a bedtime due to helping her sister out and taking care of her sister's dogs.  Patient reports that she continues to focus on protein at meals, however could do better in terms of being more consistent with adequate protein at each meal.  Patient reports that she does struggle with vegetable into, noting that she just cooks for herself and is usually looking for something easy/less complex.    Beverages: water, experimenting with different teas to help reduce/eliminate the pop   Exercise: shoveling more so these past few  days    Nutrition Diagnosis:    Overweight/Obesity (NC 3.3) related to overeating and poor lifestyle habits as evidenced by patient's subjective statements and BMI 42.51 kg/m2.       Intervention:  1. Food and/or nutrient delivery: balanced meals, adequate protein   2. Nutrition education: discussed vegetable consumption  3. Nutrition counseling: provided support and encouragement    Monitoring/Evaluation:    Goals:  1. Work towards a consistent bedtime.    2. Work on consistency of adequate protein throughout the day.     Patient to follow up in 2 month(s) with bariatrician and 3 month(s) with RD        Phone call duration: 23 minutes      Originating Location (pt. Location): Home        Distant Location (provider location):  Off-site      Krystle Ballard RD            Again, thank you for allowing me to participate in the care of your patient.        Sincerely,        Krystle Ballard RD

## 2023-01-04 NOTE — PROGRESS NOTES
Halina Ibrahim is a 57 year old who is being evaluated via a billable telephone visit.      What phone number would you like to be contacted at? [phone]  How would you like to obtain your AVS? [facility]  Weight Loss Follow-Up Diet Evaluation  Assessment:  Halina is presenting today for a follow up weight management nutrition consultation. Pt has had an initial appointment with Dr. Glasgow.   Weight loss medication: Topamax.   Pt's weight is 263.4 lbs   Initial weight: 296 lbs   Weight change: 32.6 lbs (down)    No flowsheet data found.  BMI: There is no height or weight on file to calculate BMI.  Ideal body weight: 59.3 kg (130 lb 11.7 oz)  Adjusted ideal body weight: 83.4 kg (183 lb 12.8 oz)    Estimated RMR ([place]-St Jeor equation):   1801 kcals x 1.3 (light active) = 2341 kcals (for weight maintenance)     Recommended Protein Intake: 60-80 grams of protein/day  Patient Active Problem List:  Patient Active Problem List   Diagnosis     Aortic valve disorder     S/P AVR     Restless leg syndrome     Morbid obesity (H)     Progress on goals from last visit: Hasn't had much success with establishing a bedtime due to helping her sister out and taking care of her sister's dogs.  Patient reports that she continues to focus on protein at meals, however could do better in terms of being more consistent with adequate protein at each meal.  Patient reports that she does struggle with vegetable into, noting that she just cooks for herself and is usually looking for something easy/less complex.    Beverages: water, experimenting with different teas to help reduce/eliminate the pop   Exercise: shoveling more so these past few days    Nutrition Diagnosis:    Overweight/Obesity (NC 3.3) related to overeating and poor lifestyle habits as evidenced by patient's subjective statements and BMI 42.51 kg/m2.       Intervention:  1. Food and/or nutrient delivery: balanced meals, adequate protein   2. Nutrition  education: discussed vegetable consumption  3. Nutrition counseling: provided support and encouragement    Monitoring/Evaluation:    Goals:  1. Work towards a consistent bedtime.    2. Work on consistency of adequate protein throughout the day.     Patient to follow up in 2 month(s) with bariatrician and 3 month(s) with RD        Phone call duration: 23 minutes      Originating Location (pt. Location): Home        Distant Location (provider location):  Off-site      Krystle Ballard RD

## 2023-03-14 NOTE — PROGRESS NOTES
Bariatric Care Clinic Non Surgical Follow up Visit   Date of visit: 3/20/2023  Physician: DURAN Glasgow MD, MD  Primary Care is Lu Carrillo  Halina Ibrahim   57 year old  female     Initial Weight: 296# prior to RNY on 9/14/2004    Today's Weight:   Wt Readings from Last 1 Encounters:   03/20/23 119.3 kg (263 lb)     Body mass index is 42.45 kg/m .           Assessment and Plan   Assessment: Halina is a 57 year old year old female who presents for medical weight management.      Plan:    1. Morbid obesity (H)  Patient was congratulated on her success thus far. Healthy habits to assist with further weight loss were discussed. She will check out the [employer] exercise videos. She will continue the topamax. I suggested she try to replace some of her starch with vegetables. She does like a few vegetables but was hesitant to commit to this. We discussed the patient's co-morbid conditions including migraine headaches. These likely will improve with healthy habits and weight loss.    2. History of migraine headaches  This may improve with healthy habits and weight loss.    3. Postoperative malabsorption  Patient is taking all vitamins as directed.  Recent routine postoperative labs were reviewed. She was reminded to slow down, chew foods thoroughly, and to avoid liquids within 30 minutes of solids. Written information was given. She is going to start taking 10,000 international unit(s) of vitamin D again.    Follow up in 3 months with myself           INTERIM HISTORY  Patient is taking topamax and feels that it is helping to control her hunger and cravings. She denies side effects.    DIETARY HISTORY  Meals Per Day: 3  Eating Protein First?: not always  Food Diary: B:bagel and cream cheese, plans to start eating protein oatmeal L:chicken wrap or Reginaldo's club chicken nuggets or sausage calzone or turkey sandwich D:pot pies  Snacks Per Day: not discussed  Typical Snack: not discussed  Fluid Intake: 64 oz  Portion Control:  sometimes  Calorie Containing Beverages: pepsi (1 1/2 cans)  Typical Protein Food Choices: chicken  Choosing Whole Grains: somtimes  Meals at Restaurant per week:picks up things at the grocery store often    Positive Changes Since Last Visit: cut down on soda, working on getting adequate protein  Struggling With: vegetable intake    Knowledgeable in Reading Food Labels: working on it  Getting Adequate Protein: working on it  Sleeping 7-8 hours/day : working on it, trying to go to bed earlier  Stress management not discussed    PHYSICAL ACTIVITY PATTERNS:  Some walking around the house    REVIEW OF SYSTEMS  GENERAL/CONSTITUTIONAL:  Fatigue: sometimes  HEENT:   glaucoma: no  CARDIOVASCULAR:  History of heart disease: yes  PSYCHIATRIC:  Moods: stable  ENDOCRINE:  Monitoring Blood Sugars: no  Sugars Well Controlled: na  No personal or family history of medullary thyroid cancer not discussed  :  Birth control: menopause       Patient Profile   Social History     Social History Narrative     Not on file        Past Medical History   Past Medical History:   Diagnosis Date     Aortic regurgitation      Aortic valve replaced      Degenerative disc disease      Dyslipidemia      Hashimoto's disease      History of Sabrina-en-Y gastric bypass 9/14/2004    Dr. Macario Highest weight 296#     Hypertension      Low back pain      Metabolic syndrome      Mitral valve replaced      Morbid obesity (H)      Morbid obesity with BMI of 40.0-44.9, adult (H)      Other and unspecified postsurgical nonabsorption      S/P gastric bypass      S/P MVR (mitral valve replacement)      Stroke (H)      Stroke (H)     from mass near mitral valve     Patient Active Problem List   Diagnosis     Aortic valve disorder     S/P AVR     Restless leg syndrome     Morbid obesity (H)       Past Surgical History  She has a past surgical history that includes s/p mitral valve replacement[; s/p gastric bypass[; Redo sternotomy replace valve aortic (3/14/2013);  tubal ligation; other surgical history; Revision Sabrina-En-Y; aortic valve replacement; and Replace valve mitral.     Examination   Wt 119.3 kg (263 lb)   BMI 42.45 kg/m      Wt Readings from Last 4 Encounters:   03/20/23 119.3 kg (263 lb)   12/19/22 119.5 kg (263 lb 6.4 oz)   09/26/22 122.9 kg (271 lb)   07/21/22 123.2 kg (271 lb 9.6 oz)     GENERAL: Healthy, alert and no distress  EYES: Eyes grossly normal to inspection.  No discharge or erythema, or obvious scleral/conjunctival abnormalities.  RESP: No audible wheeze, cough, or visible cyanosis.  No visible retractions or increased work of breathing.    SKIN: Visible skin clear. No significant rash, abnormal pigmentation or lesions.  NEURO: Cranial nerves grossly intact.  Mentation and speech appropriate for age.  PSYCH: Mentation appears normal, affect normal/bright, judgement and insight intact, normal speech and appearance well-groomed.     Counseling:   We reviewed the important post op bariatric recommendations:  -eating 3 meals daily  -eating protein first, getting >60gm protein daily  -eating slowly, chewing food well  -avoiding/limiting calorie containing beverages  -limiting starchy vegetables and carbohydrates, choosing wheat, not white with breads,   crackers, pastas, madelyn, bagels, tortillas, rice  -limiting restaurant or cafeteria eating to twice a week or less    We discussed the importance of restorative sleep and stress management in maintaining a healthy weight.  We discussed the National Weight Control Registry healthy weight maintenance strategies and ways to optimize metabolism.  We discussed the importance of physical activity including cardiovascular and strength training in maintaining a healthier weight.    Total time spent on the date of this encounter doing: chart review, review of test results, patient visit, physical exam, education, counseling, developing plan of care and documenting = 49 minutes.         DURAN Glasgow MD  [employer]  [facility]    Halina Ibrahim is 57 year old  female who presents for a billable video visit today.    How would you like to obtain your AVS? MyChart  If dropped from the video visit, the video invitation should be resent by: Send to e-mail at: [email or web address]  Will anyone else be joining your video visit? No      Video Start Time: 1:15 pm    Are there any specific questions or needs that you would like addressed at your visit today?     Weight management. Pt isnt sure if she needs a refill or not.     Félix Calles CMA  [facility]  Surgery [facility]  [place] [address]  Office: [phone]  Fax: [phone]              Video-Visit Details    Type of service:  Video Visit    Platform used for Video Visit: airpim    Video End Time (time video stopped): 1:48 PM    Originating Location (pt. Location): Home    Distant Location (provider location):  Off-site    Distant Location (provider location):  [facility] CL[facility] AND BARIATRICS CARE [place]

## 2023-03-20 ENCOUNTER — VIRTUAL VISIT (OUTPATIENT)
Dept: SURGERY | Facility: CLINIC | Age: 58
End: 2023-03-20
Payer: COMMERCIAL

## 2023-03-20 VITALS — BODY MASS INDEX: 42.45 KG/M2 | WEIGHT: 263 LBS

## 2023-03-20 DIAGNOSIS — E66.01 OBESITY, CLASS III, BMI 40-49.9 (MORBID OBESITY) (H): ICD-10-CM

## 2023-03-20 DIAGNOSIS — K91.2 POSTOPERATIVE MALABSORPTION: ICD-10-CM

## 2023-03-20 DIAGNOSIS — Z86.69 HISTORY OF MIGRAINE HEADACHES: ICD-10-CM

## 2023-03-20 DIAGNOSIS — E66.01 MORBID OBESITY (H): Primary | ICD-10-CM

## 2023-03-20 PROCEDURE — 99215 OFFICE O/P EST HI 40 MIN: CPT | Mod: VID | Performed by: FAMILY MEDICINE

## 2023-03-20 RX ORDER — TOPIRAMATE 50 MG/1
TABLET, FILM COATED ORAL
Qty: 30 TABLET | Refills: 2 | Status: SHIPPED | OUTPATIENT
Start: 2023-03-20 | End: 2023-06-26

## 2023-03-20 NOTE — PATIENT INSTRUCTIONS
Here are the [employer] exercise sites:    Yoga-https://www.[employer].com/user/yogawithadriene    Body strength activities, dance, high intensity interval training- https://www.[employer].com/user/popsugartvfit    Strength training- https://www.[employer].com/user/KozakSportsPerform    Walking- https://www.[employer].com/user/walkathomemedia    Sit and Be Fit- https://www.[employer]/channel/[email or web address]_g    Low impact cardio- Ronit Fung- https://www.[employer]/channel/MJxlZWpEtpnEjjoP9bhcjvmK    Cardio Yeceniaangela Paniagua- https://www.[employer]/channel/[email or web address]    30 Min low impact cardio- https://www.[employer].com/watch?v=gC_L9qAHVJ8    Body Project- https://www.[employer]/channel/SXTev5S90-JkXrFViYhrI1NT               [facility] Bariatric Care  Nutritional Guidelines  Gastric Bypass 18 Months Post Op and Beyond    General Guidelines and Helpful Hints:  Eat 3 meals per day + protein supplement(s). No snacks between meals.  Do not skip meals.  This can cause overeating at the next meal and will prevent adequate protein and nutritional intake.  Aim for 60-80 grams of protein per day.  Always eat your protein first. This assists with optimal nutrition and helps you stay full longer.  Depending on your portion size, you may need to drink approved protein supplement between meals to achieve protein goals. Follow recommendations of your Dietitian.   Eat your protein first, and then follow with fiber.   It is not necessary to count your fiber, but 15-20 grams per day is recommended.    Add fiber by including fruits, vegetables, whole grains, and beans.   Portions should remain about 1 cup per meal. Use measuring cups to be accurate.  Continue to use saucer/salad plates, infant/toddler silverware to keep portion sizes small and take small bites.  Eat S-L-O-W-L-Y to make each meal last 20-30 minutes. Always stop eating when satisfied.  Continue to use caution with foods containing skins, peels or  membranes. Chew well!  Aim for 64 oz. of calorie-free fluids daily.  Continue to avoid caffeine and carbonation. If you choose to drink alcohol, do so in moderation.   Remember to avoid drinking during meals, 15-30 minutes before and 30 minutes after.  Exercise is brady for continued weight loss and weight maintenance. Aim for 30-60 minutes of physical activity most days of the week. Include cardiovascular and strength training.  If having trouble tolerating meat, try using a crock-pot, tinfoil tent, steamer or other moist cooking method to create tender meats. Add broth or low-fat gravy to help meat stay moist.   Avoid high sugar and high fat foods to prevent dumping syndrome.  Check nutrition labels for less than 10 grams of sugar and less than 10 grams of fat per serving.  Continue Taking Vitamins/Minerals:  2011-4013 mcg of Sublingual B-12 daily  1 Multivitamin with Iron twice daily (chewable or swallow tabs)  500-600 mg Calcium Citrate twice daily (chewable or swallow tabs)  5000 IU Vitamin D3 daily  An additional iron tablet for menstruating females  You may also need an additional thiamine or B Complex tablet    Sample Grocery List    Protein:  Fat free Greek or light yogurt (less than 10 grams sugar)  Fat free or low-fat cottage cheese  String cheese or reduced fat cheese slices  Tuna, salmon, crab, egg, or chicken salad made with light or fat free mayonnaise  Egg or Egg Substitute  Lean/extra lean turkey, beef, bison, venison (ground, sirloin, round, flank)  Pork loin or tenderloin (grilled, baked, broiled)  Fish such as salmon, tuna, trout, tilapia, etc. (grilled, baked, broiled)  Tender cuts of lean (skinless) turkey or chicken  Lean deli meats: turkey, lean ham, chicken, lean roast beef  Beans such as kidney, garbanzo, black, clinton, or low-fat/fat free refried beans  Peanut butter (natural preferred). Limit to 1 Tbsp. per day.  Low-fat meatloaf (made with lean ground beef or turkey)  Sloppy [place] made with  low-sugar ketchup and lean ground beef or turkey  Soy or vegetable protein (i.e. vegan crumbles, soy/veggie burger, tofu)  Hummus    Vegetables:  Fresh: cooked or raw (as tolerated)  Frozen vegetables  Canned vegetables (low sodium or no salt added, rinse before cooking/eating)  (Ok to have skins/peels/membranes/seeds - just chew well)    Fruits:  Fresh fruit  Frozen fruit (no sugar added)  Canned fruit (packed in its own juice, NOT syrup)  (Ok to have skins/peels/membranes/seeds - just chew well)    Starch:  Unsweetened whole-grain hot cereal (or high fiber cold cereal, dry)  Toasted whole wheat bread or [place] Thins  Whole grain crackers  Baked /boiled/mashed potato/sweet potato  Cooked whole grain pasta, brown rice, or other cooked whole grains  Starchy vegetables: corn, peas, winter squash    Protein Supplement:   Ready to drink protein shake with:  15-30 grams protein per serving  Less than 10 grams total carbohydrate per serving   Protein powder mixed with:   Skim or 1% milk  Low fat or fat free Lactaid milk, plain or no sugar added soymilk  Water     Fats: (use in moderation)  1 teaspoon of soft tub margarine  1 teaspoon olive oil, canola oil, or peanut oil  1 tablespoon of low-fat deluca or salad dressing     Sample Menu for 18+ months after Gastric Bypass    You do NOT need to eat/drink the full portion sizes listed below  Always stop when you are satisfied    Breakfast   cup 1% cottage cheese     cup mixed berries   Lunch 2 oz lean roast beef on   [place] Thin with 1 tsp. light deluca    small tomato, chopped, mixed with 1 tsp. light vinaigrette dressing   Supplement Approved protein supplement (if needed between meals)   Dinner 2 oz grilled salmon    cup salad greens with 1 tsp. light salad dressing and 1 tsp. ground flax seed    cup quinoa or brown rice     Breakfast   cup egg substitute with   cup sautéed chopped vegetables  2 light [place] Krisp crackers   Lunch Tuna Melt:   cup tuna mixed with 1 tsp. light  deluca over   [place] Thin. Top with 2-3 slices cucumber and 1 oz slice of low fat cheese   Supplement 1 cup skim milk (if needed between meals)   Dinner 3 oz  grilled, broiled, or baked seasoned skinless chicken breast    cup asparagus     Breakfast   cup plain oatmeal made with skim or 1% milk with 1 Tbsp. flavored/unflavored protein powder added  1 mozzarella string cheese   Lunch 2 oz deli turkey breast  1/3 cup salad with 1 tsp. light salad dressing, 1/8 of a whole avocado and 1 Tbsp. sunflower seeds   Dinner 3 oz. pork loin made in a crock pot, seasoned with a spice rub    cup cooked carrots   Supplement Approved protein supplement (if needed between meals)     Breakfast 1 cup breakfast casserole made with egg substitute, turkey sausage,  and steamed, chopped bell peppers   Supplement  1 cup light Greek yogurt (if needed between meals)   Lunch 2 oz. teriyaki turkey    cup mashed sweet potato with 1-2 spritzes of spray butter (like [place])    cup fresh pineapple   Dinner 3 oz low fat meatloaf    cup roasted garlic zucchini     Breakfast   cup leftover breakfast casserole    cup no sugar added applesauce with 1 Tbsp. unflavored protein powder and a sprinkle of cinnamon    Lunch 3 oz shrimp with 1-2 Tbsp. low-sugar cocktail sauce for dipping    c. whole wheat pasta drizzled with   tsp. olive oil   Supplement 1 cup skim/1% milk with scoop of protein powder (if needed between meals)   Dinner Grilled, seasoned kebob with 2 oz lean beef and   cup vegetables     Breakfast Breakfast pizza:   [place] Thin spread with 1 Tbsp. low sugar spaghetti sauce,   cup shredded low fat cheese, melted and 1 slice of [place]     cup fresh fruit mixed with chopped almonds   Lunch   cup black bean soup  4-5 whole grain crackers   Dinner 3 oz  tilapia with lemon pepper seasoning    cup stewed tomatoes   Supplement 1 string cheese (if needed between meals)     Breakfast 2 hard boiled eggs (discard 1 egg yolk)    whole wheat  English Muffin with 1 tsp. low sugar jelly   Lunch   cup leftover black bean soup topped with 1-2 Tbsp. low fat cheese  2-3 light [place] Krisp crackers   Supplement Approved protein supplement (if needed between meals)   Dinner 3 oz sirloin steak    cup steamed broccoli

## 2023-03-20 NOTE — LETTER
3/20/2023         RE: Halina Ibrahim  [address] [place] [address]        Dear Colleague,    Thank you for referring your patient, Halina Ibrahim, to the [facility] [place]. Please see a copy of my visit note below.    Bariatric Care Clinic Non Surgical Follow up Visit   Date of visit: 3/20/2023  Physician: DURAN Glasgow MD, MD  Primary Care is Lu Carrillo  Halina Ibrahim   57 year old  female     Initial Weight: 296# prior to RNY on 9/14/2004    Today's Weight:   Wt Readings from Last 1 Encounters:   03/20/23 119.3 kg (263 lb)     Body mass index is 42.45 kg/m .           Assessment and Plan   Assessment: Halina is a 57 year old year old female who presents for medical weight management.      Plan:    1. Morbid obesity (H)  Patient was congratulated on her success thus far. Healthy habits to assist with further weight loss were discussed. She will check out the [employer] exercise videos. She will continue the topamax. I suggested she try to replace some of her starch with vegetables. She does like a few vegetables but was hesitant to commit to this. We discussed the patient's co-morbid conditions including migraine headaches. These likely will improve with healthy habits and weight loss.    2. History of migraine headaches  This may improve with healthy habits and weight loss.    3. Postoperative malabsorption  Patient is taking all vitamins as directed.  Recent routine postoperative labs were reviewed. She was reminded to slow down, chew foods thoroughly, and to avoid liquids within 30 minutes of solids. Written information was given. She is going to start taking 10,000 international unit(s) of vitamin D again.    Follow up in 3 months with myself           INTERIM HISTORY  Patient is taking topamax and feels that it is helping to control her hunger and cravings. She denies side effects.    DIETARY HISTORY  Meals Per Day: 3  Eating Protein First?: not  always  Food Diary: B:bagel and cream cheese, plans to start eating protein oatmeal L:chicken wrap or Reginaldo's club chicken nuggets or sausage calzone or turkey sandwich D:pot pies  Snacks Per Day: not discussed  Typical Snack: not discussed  Fluid Intake: 64 oz  Portion Control: sometimes  Calorie Containing Beverages: pepsi (1 1/2 cans)  Typical Protein Food Choices: chicken  Choosing Whole Grains: somtimes  Meals at Restaurant per week:picks up things at the grocery store often    Positive Changes Since Last Visit: cut down on soda, working on getting adequate protein  Struggling With: vegetable intake    Knowledgeable in Reading Food Labels: working on it  Getting Adequate Protein: working on it  Sleeping 7-8 hours/day : working on it, trying to go to bed earlier  Stress management not discussed    PHYSICAL ACTIVITY PATTERNS:  Some walking around the house    REVIEW OF SYSTEMS  GENERAL/CONSTITUTIONAL:  Fatigue: sometimes  HEENT:   glaucoma: no  CARDIOVASCULAR:  History of heart disease: yes  PSYCHIATRIC:  Moods: stable  ENDOCRINE:  Monitoring Blood Sugars: no  Sugars Well Controlled: na  No personal or family history of medullary thyroid cancer not discussed  :  Birth control: menopause       Patient Profile   Social History     Social History Narrative     Not on file        Past Medical History   Past Medical History:   Diagnosis Date     Aortic regurgitation      Aortic valve replaced      Degenerative disc disease      Dyslipidemia      Hashimoto's disease      History of Sabrina-en-Y gastric bypass 9/14/2004    Dr. Macario Highest weight 296#     Hypertension      Low back pain      Metabolic syndrome      Mitral valve replaced      Morbid obesity (H)      Morbid obesity with BMI of 40.0-44.9, adult (H)      Other and unspecified postsurgical nonabsorption      S/P gastric bypass      S/P MVR (mitral valve replacement)      Stroke (H)      Stroke (H)     from mass near mitral valve     Patient Active Problem List    Diagnosis     Aortic valve disorder     S/P AVR     Restless leg syndrome     Morbid obesity (H)       Past Surgical History  She has a past surgical history that includes s/p mitral valve replacement[; s/p gastric bypass[; Redo sternotomy replace valve aortic (3/14/2013); tubal ligation; other surgical history; Revision Sabrina-En-Y; aortic valve replacement; and Replace valve mitral.     Examination   Wt 119.3 kg (263 lb)   BMI 42.45 kg/m      Wt Readings from Last 4 Encounters:   03/20/23 119.3 kg (263 lb)   12/19/22 119.5 kg (263 lb 6.4 oz)   09/26/22 122.9 kg (271 lb)   07/21/22 123.2 kg (271 lb 9.6 oz)     GENERAL: Healthy, alert and no distress  EYES: Eyes grossly normal to inspection.  No discharge or erythema, or obvious scleral/conjunctival abnormalities.  RESP: No audible wheeze, cough, or visible cyanosis.  No visible retractions or increased work of breathing.    SKIN: Visible skin clear. No significant rash, abnormal pigmentation or lesions.  NEURO: Cranial nerves grossly intact.  Mentation and speech appropriate for age.  PSYCH: Mentation appears normal, affect normal/bright, judgement and insight intact, normal speech and appearance well-groomed.     Counseling:   We reviewed the important post op bariatric recommendations:  -eating 3 meals daily  -eating protein first, getting >60gm protein daily  -eating slowly, chewing food well  -avoiding/limiting calorie containing beverages  -limiting starchy vegetables and carbohydrates, choosing wheat, not white with breads,   crackers, pastas, madelyn, bagels, tortillas, rice  -limiting restaurant or cafeteria eating to twice a week or less    We discussed the importance of restorative sleep and stress management in maintaining a healthy weight.  We discussed the National Weight Control Registry healthy weight maintenance strategies and ways to optimize metabolism.  We discussed the importance of physical activity including cardiovascular and strength training  in maintaining a healthier weight.    Total time spent on the date of this encounter doing: chart review, review of test results, patient visit, physical exam, education, counseling, developing plan of care and documenting = 49 minutes.         DURAN Glasgow MD  [facility]    Halina Ibrahim is 57 year old  female who presents for a billable video visit today.    How would you like to obtain your AVS? MyChart  If dropped from the video visit, the video invitation should be resent by: Send to e-mail at: [email or web address]  Will anyone else be joining your video visit? No  {If patient encounters technical issues they should call [phone] :[id number]}    Video Start Time: 1:15 pm    Are there any specific questions or needs that you would like addressed at your visit today?     Weight management. Pt isnt sure if she needs a refill or not.     Félix Calles CMA  [facility]  Surgery [facility]  [place] [address]  Office: [phone]  Fax: [phone]          Provider Notes: {***}      Video-Visit Details    Type of service:  Video Visit    Platform used for Video Visit: Sian's Plan    Video End Time (time video stopped): {video visit now:596134}    Originating Location (pt. Location): Home    {PROVIDER LOCATION On-site should be selected for visits conducted from your clinic location or adjoining [facility] hospital, academic office, or other nearby [facility] building. Off-site should be selected for all other provider locations, including home:[id number]}    Distant Location (provider location):  Off-site    Distant Location (provider location):  [facility]GERY CLINIC [facility] BAR[facility]ATRICS CARE [place]            Again, thank you for allowing me to participate in the care of your patient.        Sincerely,        DURAN Glasgow MD

## 2023-04-16 ENCOUNTER — HEALTH MAINTENANCE LETTER (OUTPATIENT)
Age: 58
End: 2023-04-16

## 2023-04-26 ENCOUNTER — VIRTUAL VISIT (OUTPATIENT)
Dept: SURGERY | Facility: CLINIC | Age: 58
End: 2023-04-26
Payer: COMMERCIAL

## 2023-04-26 DIAGNOSIS — Z71.3 NUTRITIONAL COUNSELING: ICD-10-CM

## 2023-04-26 DIAGNOSIS — Z98.84 BARIATRIC SURGERY STATUS: Primary | ICD-10-CM

## 2023-04-26 DIAGNOSIS — K91.2 POSTOPERATIVE MALABSORPTION: ICD-10-CM

## 2023-04-26 DIAGNOSIS — E66.01 OBESITY, CLASS III, BMI 40-49.9 (MORBID OBESITY) (H): ICD-10-CM

## 2023-04-26 PROCEDURE — 97803 MED NUTRITION INDIV SUBSEQ: CPT | Mod: 93 | Performed by: DIETITIAN, REGISTERED

## 2023-04-26 NOTE — LETTER
4/26/2023         RE: Halina Ibrahim  [address] [facility] [address]        Dear Colleague,    Thank you for referring your patient, Halina Ibrahim, to the [facility] [place]. Please see a copy of my visit note below.    Halina Ibrahim is a 57 year old who is being evaluated via a billable telephone/video visit.      What phone number would you like to be contacted at? [phone]  How would you like to obtain your AVS? [facility]    Medical  Weight Loss Follow-Up Diet Evaluation  Assessment:  Halina is presenting today for a follow up weight management nutrition consultation.  This patient has had an initial appointment and was referred by Dr. Glasgow for MNT as treatment for Obesity.     Weight loss medication: Topamax.   Pt's weight is 263 lbs (last documented weight)   Initial weight: 296 lbs   Weight change: 33 lbs (down)         View : No data to display.              BMI: There is no height or weight on file to calculate BMI.  Ideal body weight: 59.3 kg (130 lb 11.7 oz)  Adjusted ideal body weight: 83.3 kg (183 lb 10.2 oz)    Estimated RMR ([place]-St Jeor equation):   1801 kcals x 1.3 (light active) = 2341 kcals (for weight maintenance)     Recommended Protein Intake: 60-80 grams of protein/day  Patient Active Problem List:  Patient Active Problem List   Diagnosis     Aortic valve disorder     S/P AVR     Restless leg syndrome     Morbid obesity (H)       Progress on goals from last visit: Feels that things are going OK, noting that she is falling short in regards to protein intake.  This tends to occur more often at lunch or supper, noting that she has been more busy lately with work, thus tends to be skipped.  Patient reports that she has not been consistent in regards to establishing a bedtime, noting that she tends to find other things to do.  Has been trying to incorporate more vegetables into her meals these days.       Nutrition  Diagnosis:    Overweight/Obesity (NC 3.3) related to overeating and poor lifestyle habits as evidenced by patient's subjective statements and BMI of 42.51 kg/m2.    Intervention:  1. Food and/or nutrient delivery: balanced meals, adequate protein  2. Nutrition education: none   3. Nutrition counseling: provided support and encouragement    Monitoring/Evaluation:    Goals:  1. Be consistent with a scheduled bedtime.    2. Work on being consistent with eating 3 meals/day, focusing on adequate protein at each meal.     Patient to follow up in 2 month(s) with bariatrician and 3 month(s) with RD        Phone call duration: 18 minutes      Originating Location (pt. Location): Home        Distant Location (provider location):  Off-site      Krystle Ballard RD            Again, thank you for allowing me to participate in the care of your patient.        Sincerely,        Krystle Ballard RD

## 2023-04-26 NOTE — PROGRESS NOTES
Halina Ibrahim is a 57 year old who is being evaluated via a billable telephone/video visit.      What phone number would you like to be contacted at? [phone]  How would you like to obtain your AVS? [facility]  Weight Loss Follow-Up Diet Evaluation  Assessment:  Halina is presenting today for a follow up weight management nutrition consultation.  This patient has had an initial appointment and was referred by Dr. Glasgow for MNT as treatment for Obesity.     Weight loss medication: Topamax.   Pt's weight is 263 lbs (last documented weight)   Initial weight: 296 lbs   Weight change: 33 lbs (down)         View : No data to display.              BMI: There is no height or weight on file to calculate BMI.  Ideal body weight: 59.3 kg (130 lb 11.7 oz)  Adjusted ideal body weight: 83.3 kg (183 lb 10.2 oz)    Estimated RMR ([place]-St Jeor equation):   1801 kcals x 1.3 (light active) = 2341 kcals (for weight maintenance)     Recommended Protein Intake: 60-80 grams of protein/day  Patient Active Problem List:  Patient Active Problem List   Diagnosis     Aortic valve disorder     S/P AVR     Restless leg syndrome     Morbid obesity (H)       Progress on goals from last visit: Feels that things are going OK, noting that she is falling short in regards to protein intake.  This tends to occur more often at lunch or supper, noting that she has been more busy lately with work, thus tends to be skipped.  Patient reports that she has not been consistent in regards to establishing a bedtime, noting that she tends to find other things to do.  Has been trying to incorporate more vegetables into her meals these days.       Nutrition Diagnosis:    Overweight/Obesity (NC 3.3) related to overeating and poor lifestyle habits as evidenced by patient's subjective statements and BMI of 42.51 kg/m2.    Intervention:  1. Food and/or nutrient delivery: balanced meals, adequate protein  2. Nutrition education: none   3. Nutrition  counseling: provided support and encouragement    Monitoring/Evaluation:    Goals:  1. Be consistent with a scheduled bedtime.    2. Work on being consistent with eating 3 meals/day, focusing on adequate protein at each meal.     Patient to follow up in 2 month(s) with bariatrician and 3 month(s) with RD        Phone call duration: 18 minutes      Originating Location (pt. Location): Home        Distant Location (provider location):  Off-site      Krystle Ballard RD

## 2023-05-15 NOTE — PATIENT INSTRUCTIONS - HE
Health system Bariatric Care  Nutritional Guidelines  Gastric Bypass 18 Months Post Op and Beyond    General Guidelines and Helpful Hints:    Eat 3 meals per day + protein supplement(s). No snacks between meals.  o Do not skip meals.  This can cause overeating at the next meal and will prevent adequate protein and nutritional intake.    Aim for 60-80 grams of protein per day.  o Always eat your protein first. This assists with optimal nutrition and helps you stay full longer.  o Depending on your portion size, you may need to drink approved protein supplement between meals to achieve protein goals. Follow recommendations of your Dietitian.     Eat your protein first, and then follow with fiber.   o It is not necessary to count your fiber, but 15-20 grams per day is recommended.    o Add fiber by including fruits, vegetables, whole grains, and beans.     Portions should remain about 1 cup per meal. Use measuring cups to be accurate.    Continue to use saucer/salad plates, infant/toddler silverware to keep portion sizes small and take small bites.    Eat S-L-O-W-L-Y to make each meal last 20-30 minutes. Always stop eating when satisfied.    Continue to use caution with foods containing skins, peels or membranes. Chew well!    Aim for 64 oz. of calorie-free fluids daily.  o Continue to avoid caffeine and carbonation. If you choose to drink alcohol, do so in moderation.   o Remember to avoid drinking during meals, 15-30 minutes before and 30 minutes after.    Exercise is brady for continued weight loss and weight maintenance. Aim for 30-60 minutes of physical activity most days of the week. Include cardiovascular and strength training.    If having trouble tolerating meat, try using a crock-pot, tinfoil tent, steamer or other moist cooking method to create tender meats. Add broth or low-fat gravy to help meat stay moist.     Avoid high sugar and high fat foods to prevent dumping syndrome.  o Check nutrition labels for less  than 10 grams of sugar and less than 10 grams of fat per serving.    Continue Taking Vitamins/Minerals:  o 7863-5763 mcg of Sublingual B-12 daily  o 1 Multivitamin with Iron twice daily (chewable or swallow tabs)  o 500-600 mg Calcium Citrate twice daily (chewable or swallow tabs)  o 5000 IU Vitamin D3 daily    Sample Grocery List    Protein:    Fat free Greek or light yogurt (less than 10 grams sugar)    Fat free or low-fat cottage cheese    String cheese or reduced fat cheese slices    Tuna, salmon, crab, egg, or chicken salad made with light or fat free mayonnaise    Egg or Egg Substitute    Lean/extra lean turkey, beef, bison, venison (ground, sirloin, round, flank)    Pork loin or tenderloin (grilled, baked, broiled)    Fish such as salmon, tuna, trout, tilapia, etc. (grilled, baked, broiled)    Tender cuts of lean (skinless) turkey or chicken    Lean deli meats: turkey, lean ham, chicken, lean roast beef    Beans such as kidney, garbanzo, black, [place], or low-fat/fat free refried beans    Peanut butter (natural preferred). Limit to 1 Tbsp. per day.    Low-fat meatloaf (made with lean ground beef or turkey)    Sloppy Joes made with low-sugar ketchup and lean ground beef or turkey    Soy or vegetable protein (i.e. vegan crumbles, soy/veggie burger, tofu)    Hummus    Vegetables:    Fresh: cooked or raw (as tolerated)    Frozen vegetables    Canned vegetables (low sodium or no salt added, rinse before cooking/eating)    (Ok to have skins/peels/membranes/seeds - just chew well)    Fruits:    Fresh fruit    Frozen fruit (no sugar added)    Canned fruit (packed in its own juice, NOT syrup)    (Ok to have skins/peels/membranes/seeds - just chew well)    Starch:    Unsweetened whole-grain hot cereal (or high fiber cold cereal, dry)    Toasted whole wheat bread or [place] Thins    Whole grain crackers    Baked /boiled/mashed potato/sweet potato    Cooked whole grain pasta, brown rice, or other cooked whole  grains    Starchy vegetables: corn, peas, winter squash    Protein Supplement:     Ready to drink protein shake with:  o 15-30 grams protein per serving  o Less than 10 grams total carbohydrate per serving     Protein powder mixed with:  o  Skim or 1% milk  o Low fat or fat free Lactaid milk, plain or no sugar added soymilk  o Water     Fats: (use in moderation)    1 teaspoon of soft tub margarine    1 teaspoon olive oil, canola oil, or peanut oil    1 tablespoon of low-fat deluca or salad dressing     Sample Menu for 18+ months after Gastric Bypass    You do NOT need to eat/drink the full portion sizes listed below  Always stop when you are satisfied    Breakfast   cup 1% cottage cheese     cup mixed berries   Lunch 2 oz lean roast beef on   [place] Thin with 1 tsp. light deluca    small tomato, chopped, mixed with 1 tsp. light vinaigrette dressing   Supplement Approved protein supplement (if needed between meals)   Dinner 2 oz grilled salmon    cup salad greens with 1 tsp. light salad dressing and 1 tsp. ground flax seed    cup quinoa or brown rice     Breakfast   cup egg substitute with   cup sautéed chopped vegetables  2 light [place] Krisp crackers   Lunch Tuna Melt:   cup tuna mixed with 1 tsp. light deluca over   [place] Thin. Top with 2-3 slices cucumber and 1 oz slice of low fat cheese   Supplement 1 cup skim milk (if needed between meals)   Dinner 3 oz  grilled, broiled, or baked seasoned skinless chicken breast    cup asparagus     Breakfast   cup plain oatmeal made with skim or 1% milk with 1 Tbsp. flavored/unflavored protein powder added  1 mozzarella string cheese   Lunch 2 oz deli turkey breast  1/3 cup salad with 1 tsp. light salad dressing, 1/8 of a whole avocado and 1 Tbsp. sunflower seeds   Dinner 3 oz. pork loin made in a crock pot, seasoned with a spice rub    cup cooked carrots   Supplement Approved protein supplement (if needed between meals)     Breakfast 1 cup breakfast casserole made with egg  substitute, turkey sausage,  and steamed, chopped bell peppers   Supplement  1 cup light Greek yogurt (if needed between meals)   Lunch 2 oz. teriyaki turkey    cup mashed sweet potato with 1-2 spritzes of spray butter (like [place])    cup fresh pineapple   Dinner 3 oz low fat meatloaf    cup roasted garlic zucchini     Breakfast   cup leftover breakfast casserole    cup no sugar added applesauce with 1 Tbsp. unflavored protein powder and a sprinkle of cinnamon    Lunch 3 oz shrimp with 1-2 Tbsp. low-sugar cocktail sauce for dipping    c. whole wheat pasta drizzled with   tsp. olive oil   Supplement 1 cup skim/1% milk with scoop of protein powder (if needed between meals)   Dinner Grilled, seasoned kebob with 2 oz lean beef and   cup vegetables     Breakfast Breakfast pizza:   [place] Thin spread with 1 Tbsp. low sugar spaghetti sauce,   cup shredded low fat cheese, melted and 1 slice of [place]     cup fresh fruit mixed with chopped almonds   Lunch   cup black bean soup  4-5 whole grain crackers   Dinner 3 oz  tilapia with lemon pepper seasoning    cup stewed tomatoes   Supplement 1 string cheese (if needed between meals)     Breakfast 2 hard boiled eggs (discard 1 egg yolk)    whole wheat English Muffin with 1 tsp. low sugar jelly   Lunch   cup leftover black bean soup topped with 1-2 Tbsp. low fat cheese  2-3 light [place] Krisp crackers   Supplement Approved protein supplement (if needed between meals)   Dinner 3 oz sirloin steak    cup steamed broccoli        15-May-2023

## 2023-06-13 NOTE — PROGRESS NOTES
Bariatric Care Clinic Non Surgical Follow up Visit   Date of visit: 6/26/2023  Physician: DURAN Glasgow MD, MD  Primary Care is Lu Carrillo  Halina Ibrahim   57 year old  female     Initial Weight: 296# prior to RNY on 9/14/2004  Today's Weight:   Wt Readings from Last 1 Encounters:   06/26/23 122 kg (269 lb)     Body mass index is 43.42 kg/m .           Assessment and Plan   Assessment: Halina is a 57 year old year old female who presents for medical weight management.      Plan:    1. Obesity, Class III, BMI 40-49.9 (morbid obesity) (H)  Patient was congratulated on her success thus far. Healthy habits to assist with further weight loss were discussed. She will work on trying new vegetables and increase her intake. She will try saxenda. If it is not covered she will restart the topamax. We discussed the patient's co-morbid conditions including migraine headaches and hypertension. These likely will improve with healthy habits and weight loss.    2. History of migraine headaches  This may improve with healthy habits and weight loss.    Follow up in 3 months for routine annual post op visit           INTERIM HISTORY  Patient ran out of topamax for appetite about 3 weeks ago. Since she stopped it she has noticed increased cravings for sweets. She thinks her portions are still under control.     DIETARY HISTORY  Meals Per Day: 3  Eating Protein First?: 3  Food Diary: B:egg and cheese omlette or hard boiled eggs L:chicken nuggets ([employer]) or turkey and cheese sandwich or chicken wrap D:chicken or burger or [place] breakfast  bowl  Snacks Per Day: increased recently  Typical Snack: craving sweets lately  Fluid Intake: 64 oz  Portion Control: working on it  Calorie Containing Beverages: rare  Choosing Whole Grains: sometimes      Positive Changes Since Last Visit: working on portion control  Struggling With: eating vegetables    Knowledgeable in Reading Food Labels: not sure  Getting Adequate Protein: most  "of the time    PHYSICAL ACTIVITY PATTERNS:  Gardening    REVIEW OF SYSTEMS  GENERAL/CONSTITUTIONAL:  Fatigue: no  HEENT:   glaucoma: no  CARDIOVASCULAR:  History of heart disease: yes  PULMONARY:  Dyspnea on exertion: sometimes  GI:  Pancreatitis: no  NEUROLOGIC:  Paresthesias: no  PSYCHIATRIC:  Moods: stable  MUSCULOSKELETAL/RHEUMATOLOGIC  Arthralgias: no  Myalgias: yes  ENDOCRINE:  Monitoring Blood Sugars: no  Sugars Well Controlled: no  No personal or family history of medullary thyroid cancer no  :  Birth control: menopause       Patient Profile   Social History     Social History Narrative     Not on file        Past Medical History   Past Medical History:   Diagnosis Date     Aortic regurgitation      Aortic valve replaced      Degenerative disc disease      Dyslipidemia      Hashimoto's disease      History of Sabrina-en-Y gastric bypass 9/14/2004    Dr. Macario Highest weight 296#     Hypertension      Low back pain      Metabolic syndrome      Mitral valve replaced      Morbid obesity (H)      Morbid obesity with BMI of 40.0-44.9, adult (H)      Other and unspecified postsurgical nonabsorption      S/P gastric bypass      S/P MVR (mitral valve replacement)      Stroke (H)      Stroke (H)     from mass near mitral valve     Patient Active Problem List   Diagnosis     Aortic valve disorder     S/P AVR     Restless leg syndrome     Morbid obesity (H)       Past Surgical History  She has a past surgical history that includes s/p mitral valve replacement[; s/p gastric bypass[; Redo sternotomy replace valve aortic (3/14/2013); tubal ligation; other surgical history; Revision Sabrina-En-Y; aortic valve replacement; and Replace valve mitral.     Examination   Ht 1.676 m (5' 6\")   Wt 122 kg (269 lb)   BMI 43.42 kg/m    Wt Readings from Last 4 Encounters:   06/26/23 122 kg (269 lb)   03/20/23 119.3 kg (263 lb)   12/19/22 119.5 kg (263 lb 6.4 oz)   09/26/22 122.9 kg (271 lb)     GENERAL: Healthy, alert and no " distress  EYES: Eyes grossly normal to inspection.  No discharge or erythema, or obvious scleral/conjunctival abnormalities.  RESP: No audible wheeze, cough, or visible cyanosis.  No visible retractions or increased work of breathing.    SKIN: Visible skin clear. No significant rash, abnormal pigmentation or lesions.  NEURO: Cranial nerves grossly intact.  Mentation and speech appropriate for age.  PSYCH: Mentation appears normal, affect normal/bright, judgement and insight intact, normal speech and appearance well-groomed.       Counseling:   We reviewed the important post op bariatric recommendations:  -eating 3 meals daily  -eating protein first, getting >60gm protein daily  -eating slowly, chewing food well  -avoiding/limiting calorie containing beverages  -limiting starchy vegetables and carbohydrates, choosing wheat, not white with breads,   crackers, pastas, madelyn, bagels, tortillas, rice  -limiting restaurant or cafeteria eating to twice a week or less    We discussed the importance of restorative sleep and stress management in maintaining a healthy weight.  We discussed the National Weight Control Registry healthy weight maintenance strategies and ways to optimize metabolism.  We discussed the importance of physical activity including cardiovascular and strength training in maintaining a healthier weight.    Total time spent on the date of this encounter doing: chart review, review of test results, patient visit, physical exam, education, counseling, developing plan of care and documenting = 54 minutes.         DURAN Glasgow MD  [facility]

## 2023-06-26 ENCOUNTER — VIRTUAL VISIT (OUTPATIENT)
Dept: SURGERY | Facility: CLINIC | Age: 58
End: 2023-06-26
Payer: COMMERCIAL

## 2023-06-26 VITALS — BODY MASS INDEX: 43.23 KG/M2 | HEIGHT: 66 IN | WEIGHT: 269 LBS

## 2023-06-26 DIAGNOSIS — R10.13 EPIGASTRIC PAIN: ICD-10-CM

## 2023-06-26 DIAGNOSIS — Z86.69 HISTORY OF MIGRAINE HEADACHES: ICD-10-CM

## 2023-06-26 DIAGNOSIS — E66.01 OBESITY, CLASS III, BMI 40-49.9 (MORBID OBESITY) (H): Primary | ICD-10-CM

## 2023-06-26 PROCEDURE — 99215 OFFICE O/P EST HI 40 MIN: CPT | Mod: VID | Performed by: FAMILY MEDICINE

## 2023-06-26 RX ORDER — TOPIRAMATE 50 MG/1
50 TABLET, FILM COATED ORAL 2 TIMES DAILY
COMMUNITY
End: 2023-09-26

## 2023-06-26 RX ORDER — LEVOTHYROXINE SODIUM 175 UG/1
TABLET ORAL
COMMUNITY
Start: 2023-06-25

## 2023-06-26 NOTE — PROGRESS NOTES
Halina Ibrahim is 57 year old  female who presents for a billable video visit today.    How would you like to obtain your AVS? MyChart  If dropped from the video visit, the video invitation should be resent by: Text to cell phone: [phone]  Will anyone else be joining your video visit? No      Video Start Time: 1:13 PM    Are there any specific questions or needs that you would like addressed at your visit today? Topamax refill? And lab orders for annual visit in sept       Video-Visit Details    Type of service:  Video Visit    Platform used for Video Visit: thrdPlace    Video End Time (time video stopped): 1:51 PM    Originating Location (pt. Location): Home    Distant Location (provider location):  On-site    Distant Location (provider location):  [facility]RICS [facility] [place]

## 2023-06-26 NOTE — LETTER
6/26/2023         RE: Halina Ibrahim  [address] [facility] [address]        Dear Colleague,    Thank you for referring your patient, Halina Ibrahim, to the [facility] AND BARIATRICS CARE [place]. Please see a copy of my visit note below.    Bariatric Care Clinic Non Surgical Follow up Visit   Date of visit: 6/26/2023  Physician: DURAN Glasgow MD, MD  Primary Care is Lu Carrillo  Halina Ibrahim   57 year old  female     Initial Weight: 296# prior to RNY on 9/14/2004  Today's Weight:   Wt Readings from Last 1 Encounters:   06/26/23 122 kg (269 lb)     Body mass index is 43.42 kg/m .           Assessment and Plan   Assessment: Halina is a 57 year old year old female who presents for medical weight management.      Plan:    1. Obesity, Class III, BMI 40-49.9 (morbid obesity) (H)  Patient was congratulated on her success thus far. Healthy habits to assist with further weight loss were discussed. She will work on trying new vegetables and increase her intake. She will try saxenda. If it is not covered she will restart the topamax. We discussed the patient's co-morbid conditions including migraine headaches and hypertension. These likely will improve with healthy habits and weight loss.    2. History of migraine headaches  This may improve with healthy habits and weight loss.    Follow up in 3 months for routine annual post op visit           INTERIM HISTORY  Patient ran out of topamax for appetite about 3 weeks ago. Since she stopped it she has noticed increased cravings for sweets. She thinks her portions are still under control.     DIETARY HISTORY  Meals Per Day: 3  Eating Protein First?: 3  Food Diary: B:egg and cheese omlette or hard boiled eggs L:chicken nuggets ([employer]) or turkey and cheese sandwich or chicken wrap D:chicken or burger or [place] breakfast  bowl  Snacks Per Day: increased recently  Typical Snack: craving sweets lately  Fluid Intake: 64 oz  Portion  Control: working on it  Calorie Containing Beverages: rare  Choosing Whole Grains: sometimes      Positive Changes Since Last Visit: working on portion control  Struggling With: eating vegetables    Knowledgeable in Reading Food Labels: not sure  Getting Adequate Protein: most of the time    PHYSICAL ACTIVITY PATTERNS:  Gardening    REVIEW OF SYSTEMS  GENERAL/CONSTITUTIONAL:  Fatigue: no  HEENT:   glaucoma: no  CARDIOVASCULAR:  History of heart disease: yes  PULMONARY:  Dyspnea on exertion: sometimes  GI:  Pancreatitis: no  NEUROLOGIC:  Paresthesias: no  PSYCHIATRIC:  Moods: stable  MUSCULOSKELETAL/RHEUMATOLOGIC  Arthralgias: no  Myalgias: yes  ENDOCRINE:  Monitoring Blood Sugars: no  Sugars Well Controlled: no  No personal or family history of medullary thyroid cancer no  :  Birth control: menopause       Patient Profile   Social History     Social History Narrative     Not on file        Past Medical History   Past Medical History:   Diagnosis Date     Aortic regurgitation      Aortic valve replaced      Degenerative disc disease      Dyslipidemia      Hashimoto's disease      History of Sabrina-en-Y gastric bypass 9/14/2004    Dr. Macario Highest weight 296#     Hypertension      Low back pain      Metabolic syndrome      Mitral valve replaced      Morbid obesity (H)      Morbid obesity with BMI of 40.0-44.9, adult (H)      Other and unspecified postsurgical nonabsorption      S/P gastric bypass      S/P MVR (mitral valve replacement)      Stroke (H)      Stroke (H)     from mass near mitral valve     Patient Active Problem List   Diagnosis     Aortic valve disorder     S/P AVR     Restless leg syndrome     Morbid obesity (H)       Past Surgical History  She has a past surgical history that includes s/p mitral valve replacement[; s/p gastric bypass[; Redo sternotomy replace valve aortic (3/14/2013); tubal ligation; other surgical history; Revision Sabrina-En-Y; aortic valve replacement; and Replace valve mitral.  "    Examination   Ht 1.676 m (5' 6\")   Wt 122 kg (269 lb)   BMI 43.42 kg/m    Wt Readings from Last 4 Encounters:   06/26/23 122 kg (269 lb)   03/20/23 119.3 kg (263 lb)   12/19/22 119.5 kg (263 lb 6.4 oz)   09/26/22 122.9 kg (271 lb)     GENERAL: Healthy, alert and no distress  EYES: Eyes grossly normal to inspection.  No discharge or erythema, or obvious scleral/conjunctival abnormalities.  RESP: No audible wheeze, cough, or visible cyanosis.  No visible retractions or increased work of breathing.    SKIN: Visible skin clear. No significant rash, abnormal pigmentation or lesions.  NEURO: Cranial nerves grossly intact.  Mentation and speech appropriate for age.  PSYCH: Mentation appears normal, affect normal/bright, judgement and insight intact, normal speech and appearance well-groomed.       Counseling:   We reviewed the important post op bariatric recommendations:  -eating 3 meals daily  -eating protein first, getting >60gm protein daily  -eating slowly, chewing food well  -avoiding/limiting calorie containing beverages  -limiting starchy vegetables and carbohydrates, choosing wheat, not white with breads,   crackers, pastas, madelyn, bagels, tortillas, rice  -limiting restaurant or cafeteria eating to twice a week or less    We discussed the importance of restorative sleep and stress management in maintaining a healthy weight.  We discussed the National Weight Control Registry healthy weight maintenance strategies and ways to optimize metabolism.  We discussed the importance of physical activity including cardiovascular and strength training in maintaining a healthier weight.    Total time spent on the date of this encounter doing: chart review, review of test results, patient visit, physical exam, education, counseling, developing plan of care and documenting = 54 minutes.         DURAN Glasgow MD  [facility]             Halina Ibrahim is 57 year old  female who presents for a " billable video visit today.    How would you like to obtain your AVS? MyChart  If dropped from the video visit, the video invitation should be resent by: Text to cell phone: [phone]  Will anyone else be joining your video visit? No      Video Start Time: 1:13 PM    Are there any specific questions or needs that you would like addressed at your visit today? Topamax refill? And lab orders for annual visit in sept       Video-Visit Details    Type of service:  Video Visit    Platform used for Video Visit: Mirego    Video End Time (time video stopped): 1:51 PM    Originating Location (pt. Location): Home    Distant Location (provider location):  On-site    Distant Location (provider location):  [facility] SURGERY [facility] AND BARIATRICS CARE [place]       Again, thank you for allowing me to participate in the care of your patient.        Sincerely,        DURAN Glasgow MD

## 2023-06-26 NOTE — PATIENT INSTRUCTIONS

## 2023-07-19 ENCOUNTER — VIRTUAL VISIT (OUTPATIENT)
Dept: SURGERY | Facility: CLINIC | Age: 58
End: 2023-07-19
Payer: COMMERCIAL

## 2023-07-19 DIAGNOSIS — E66.01 OBESITY, CLASS III, BMI 40-49.9 (MORBID OBESITY) (H): ICD-10-CM

## 2023-07-19 DIAGNOSIS — Z71.3 NUTRITIONAL COUNSELING: ICD-10-CM

## 2023-07-19 DIAGNOSIS — K91.2 POSTOPERATIVE MALABSORPTION: ICD-10-CM

## 2023-07-19 DIAGNOSIS — Z98.84 BARIATRIC SURGERY STATUS: Primary | ICD-10-CM

## 2023-07-19 PROCEDURE — 97803 MED NUTRITION INDIV SUBSEQ: CPT | Mod: 93 | Performed by: DIETITIAN, REGISTERED

## 2023-07-19 NOTE — LETTER
7/19/2023         RE: Halina Ibrahim  [address] [facility] [address]        Dear Colleague,    Thank you for referring your patient, Halina Ibrahim, to the [facility] SURGERY CLINIC AND BARIATRICS CARE [place]. Please see a copy of my visit note below.    Halina Ibrahim is a 57 year old who is being evaluated via a billable telephone visit.      What phone number would you like to be contacted at? [phone]  How would you like to obtain your AVS? [facility]  Weight Loss Follow-Up Diet Evaluation  Assessment:  Halina is presenting today for a follow up weight management nutrition consultation.  This patient has had an initial appointment and was referred by Dr. Glasgow for MNT as treatment for Obesity.  Patient has a history of RNY GB on 9/14/2004.     Weight loss medication: Saxenda-switched over ~ 1 month ago. Has noted sudden decrease in the appetite/intake. Has noticed nausea and almost like a heartburn feeling, where she wants to throw up. Has started to take Omeprazole to help along with TUMS to help with relief.    Pt's weight is 269 lbs   Initial weight: 296 lbs  Weight change: 27 lbs          No data to display              BMI: There is no height or weight on file to calculate BMI.  Ideal body weight: 59.3 kg (130 lb 11.7 oz)  Adjusted ideal body weight: 84.4 kg (186 lb 0.6 oz)    Estimated RMR ([place]-St Jeor equation):   1826 kcals x 1.3 (light active) = 2374 kcals (for weight maintenance)     Recommended Protein Intake: 60-80 grams of protein/day  Patient Active Problem List:  Patient Active Problem List   Diagnosis     Aortic valve disorder     S/P AVR     Restless leg syndrome     Morbid obesity (H)       Progress on goals from last visit: has noticed that since starting the medication, her portion sizes at meals have significantly decreased since starting the medication. Continues to take all of her vitamins including MVI, Calcium, Vitamin D, and Vitamin B12.     Dietary  Recall:  Breakfast: bagel (less than a 1/2), hard boiled eggs (2) or cheese omelette   Lunch:egg and cheese omelette or chicken quesadilla or 2 hard boiled eggs or chicken caesar wrap or chicken bites with BBQ sauce   Dinner:similar to lunch or chicken breast with Italian Seasoning or steak or pork loin or breakfast bowl   Typical snacks: not a lot-occasional ice cream (here or there)  Beverages: water   Exercise: no set regimen-some gardening     Nutrition Diagnosis:    Obesity (NC 3.3) related to overeating and poor lifestyle habits as evidenced by patient's subjective statements and BMI 43.5 kg/m2.     Intervention:  1. Food and/or nutrient delivery: balanced meals, adequate protein  2. Nutrition education: none   3. Nutrition counseling: provided support and encouragement    Monitoring/Evaluation:    Goals:  1. Continue to focus on protein first at each meal, consuming 60-80 grams of protein/day.     Patient to follow up in 2 month(s) with bariatrician and 3 month(s) with RD        Phone call duration: 27 minutes      Originating Location (pt. Location): Home        Distant Location (provider location):  Off-site    Krystle Ballard RD            Again, thank you for allowing me to participate in the care of your patient.        Sincerely,        Krystle Ballard RD

## 2023-07-19 NOTE — PROGRESS NOTES
Halina Ibrahim is a 57 year old who is being evaluated via a billable telephone visit.      What phone number would you like to be contacted at? [phone]  How would you like to obtain your AVS? [facility]    Medical  Weight Loss Follow-Up Diet Evaluation  Assessment:  Halina is presenting today for a follow up weight management nutrition consultation.  This patient has had an initial appointment and was referred by Dr. Glasgow for MNT as treatment for Obesity.  Patient has a history of RNY GB on 9/14/2004.     Weight loss medication: Saxenda-switched over ~ 1 month ago. Has noted sudden decrease in the appetite/intake. Has noticed nausea and almost like a heartburn feeling, where she wants to throw up. Has started to take Omeprazole to help along with TUMS to help with relief.    Pt's weight is 269 lbs   Initial weight: 296 lbs  Weight change: 27 lbs          No data to display              BMI: There is no height or weight on file to calculate BMI.  Ideal body weight: 59.3 kg (130 lb 11.7 oz)  Adjusted ideal body weight: 84.4 kg (186 lb 0.6 oz)    Estimated RMR ([place]-St Jeor equation):   1826 kcals x 1.3 (light active) = 2374 kcals (for weight maintenance)     Recommended Protein Intake: 60-80 grams of protein/day  Patient Active Problem List:  Patient Active Problem List   Diagnosis     Aortic valve disorder     S/P AVR     Restless leg syndrome     Morbid obesity (H)       Progress on goals from last visit: has noticed that since starting the medication, her portion sizes at meals have significantly decreased since starting the medication. Continues to take all of her vitamins including MVI, Calcium, Vitamin D, and Vitamin B12.     Dietary Recall:  Breakfast: bagel (less than a 1/2), hard boiled eggs (2) or cheese omelette   Lunch:egg and cheese omelette or chicken quesadilla or 2 hard boiled eggs or chicken caesar wrap or chicken bites with BBQ sauce   Dinner:similar to lunch or chicken breast with Italian  Seasoning or steak or pork loin or breakfast bowl   Typical snacks: not a lot-occasional ice cream (here or there)  Beverages: water   Exercise: no set regimen-some gardening     Nutrition Diagnosis:    Obesity (NC 3.3) related to overeating and poor lifestyle habits as evidenced by patient's subjective statements and BMI 43.5 kg/m2.     Intervention:  1. Food and/or nutrient delivery: balanced meals, adequate protein  2. Nutrition education: none   3. Nutrition counseling: provided support and encouragement    Monitoring/Evaluation:    Goals:  1. Continue to focus on protein first at each meal, consuming 60-80 grams of protein/day.     Patient to follow up in 2 month(s) with bariatrician and 3 month(s) with RD        Phone call duration: 27 minutes      Originating Location (pt. Location): Home        Distant Location (provider location):  Off-site    Krystle Ballard RD

## 2023-07-27 DIAGNOSIS — R10.13 EPIGASTRIC PAIN: ICD-10-CM

## 2023-07-28 ENCOUNTER — MYC MEDICAL ADVICE (OUTPATIENT)
Dept: SURGERY | Facility: CLINIC | Age: 58
End: 2023-07-28
Payer: COMMERCIAL

## 2023-08-01 VITALS — HEIGHT: 66 IN | BODY MASS INDEX: 42.33 KG/M2 | WEIGHT: 263.4 LBS

## 2023-08-01 NOTE — TELEPHONE ENCOUNTER
Weight added to flowsheets.    Vivian Steiner RN, CBN  [facility]  P [phone]  F [phone]

## 2023-08-08 ENCOUNTER — TELEPHONE (OUTPATIENT)
Dept: SURGERY | Facility: CLINIC | Age: 58
End: 2023-08-08
Payer: COMMERCIAL

## 2023-08-08 ENCOUNTER — MYC MEDICAL ADVICE (OUTPATIENT)
Dept: SURGERY | Facility: CLINIC | Age: 58
End: 2023-08-08
Payer: COMMERCIAL

## 2023-08-08 DIAGNOSIS — K90.9 INTESTINAL MALABSORPTION, UNSPECIFIED TYPE: ICD-10-CM

## 2023-08-08 DIAGNOSIS — Z98.84 S/P GASTRIC BYPASS: ICD-10-CM

## 2023-08-08 DIAGNOSIS — K91.2 POSTOPERATIVE MALABSORPTION: Primary | ICD-10-CM

## 2023-08-08 NOTE — TELEPHONE ENCOUNTER
Orders placed and message sent to pt.    Vivian Steiner RN, CBN  [facility]  P [phone]  F [phone]

## 2023-08-08 NOTE — TELEPHONE ENCOUNTER
19 yrs post op lab orders placed for patient in preparation for appointment with  in September. Pt has lab appt scheduled for 9/6/2023.    Vivian Steiner RN, CBN  [facility]  P [phone]  F [phone]

## 2023-09-12 ENCOUNTER — LAB (OUTPATIENT)
Dept: LAB | Facility: CLINIC | Age: 58
End: 2023-09-12
Payer: COMMERCIAL

## 2023-09-12 ENCOUNTER — TELEPHONE (OUTPATIENT)
Dept: SURGERY | Facility: CLINIC | Age: 58
End: 2023-09-12

## 2023-09-12 DIAGNOSIS — R79.89 ELEVATED FERRITIN: ICD-10-CM

## 2023-09-12 DIAGNOSIS — R79.89 ELEVATED LFTS: ICD-10-CM

## 2023-09-12 DIAGNOSIS — K90.9 INTESTINAL MALABSORPTION, UNSPECIFIED TYPE: ICD-10-CM

## 2023-09-12 DIAGNOSIS — K91.2 POSTOPERATIVE MALABSORPTION: ICD-10-CM

## 2023-09-12 DIAGNOSIS — R79.89 ELEVATED LFTS: Primary | ICD-10-CM

## 2023-09-12 DIAGNOSIS — Z98.84 S/P GASTRIC BYPASS: ICD-10-CM

## 2023-09-12 LAB
ALBUMIN SERPL BCG-MCNC: 3.7 G/DL (ref 3.5–5.2)
ALP SERPL-CCNC: 327 U/L (ref 35–104)
ALT SERPL W P-5'-P-CCNC: 690 U/L (ref 0–50)
ANION GAP SERPL CALCULATED.3IONS-SCNC: 9 MMOL/L (ref 7–15)
AST SERPL W P-5'-P-CCNC: 486 U/L (ref 0–45)
BILIRUB SERPL-MCNC: 0.3 MG/DL
BUN SERPL-MCNC: 12.2 MG/DL (ref 6–20)
CALCIUM SERPL-MCNC: 8.8 MG/DL (ref 8.6–10)
CHLORIDE SERPL-SCNC: 105 MMOL/L (ref 98–107)
CHOLEST SERPL-MCNC: 177 MG/DL
CREAT SERPL-MCNC: 0.91 MG/DL (ref 0.51–0.95)
DEPRECATED CALCIDIOL+CALCIFEROL SERPL-MC: 62 UG/L (ref 20–75)
DEPRECATED HCO3 PLAS-SCNC: 27 MMOL/L (ref 22–29)
EGFRCR SERPLBLD CKD-EPI 2021: 73 ML/MIN/1.73M2
ERYTHROCYTE [DISTWIDTH] IN BLOOD BY AUTOMATED COUNT: 13.9 % (ref 10–15)
FERRITIN SERPL-MCNC: 1085 NG/ML (ref 11–328)
FOLATE SERPL-MCNC: >40 NG/ML (ref 4.6–34.8)
GLUCOSE SERPL-MCNC: 98 MG/DL (ref 70–99)
HBA1C MFR BLD: 5.5 % (ref 0–5.6)
HCT VFR BLD AUTO: 39.1 % (ref 35–47)
HDLC SERPL-MCNC: 63 MG/DL
HGB BLD-MCNC: 12.8 G/DL (ref 11.7–15.7)
LDLC SERPL CALC-MCNC: 91 MG/DL
MCH RBC QN AUTO: 28.1 PG (ref 26.5–33)
MCHC RBC AUTO-ENTMCNC: 32.7 G/DL (ref 31.5–36.5)
MCV RBC AUTO: 86 FL (ref 78–100)
NONHDLC SERPL-MCNC: 114 MG/DL
PLATELET # BLD AUTO: 236 10E3/UL (ref 150–450)
POTASSIUM SERPL-SCNC: 4.3 MMOL/L (ref 3.4–5.3)
PROT SERPL-MCNC: 6.5 G/DL (ref 6.4–8.3)
PTH-INTACT SERPL-MCNC: 51 PG/ML (ref 15–65)
RBC # BLD AUTO: 4.56 10E6/UL (ref 3.8–5.2)
SODIUM SERPL-SCNC: 141 MMOL/L (ref 136–145)
TRIGL SERPL-MCNC: 116 MG/DL
VIT B12 SERPL-MCNC: 1928 PG/ML (ref 232–1245)
WBC # BLD AUTO: 6.1 10E3/UL (ref 4–11)

## 2023-09-12 PROCEDURE — 82306 VITAMIN D 25 HYDROXY: CPT

## 2023-09-12 PROCEDURE — 83540 ASSAY OF IRON: CPT

## 2023-09-12 PROCEDURE — 83550 IRON BINDING TEST: CPT

## 2023-09-12 PROCEDURE — 84630 ASSAY OF ZINC: CPT | Mod: 90

## 2023-09-12 PROCEDURE — 82728 ASSAY OF FERRITIN: CPT

## 2023-09-12 PROCEDURE — 82977 ASSAY OF GGT: CPT

## 2023-09-12 PROCEDURE — 87340 HEPATITIS B SURFACE AG IA: CPT

## 2023-09-12 PROCEDURE — 85027 COMPLETE CBC AUTOMATED: CPT

## 2023-09-12 PROCEDURE — 86803 HEPATITIS C AB TEST: CPT

## 2023-09-12 PROCEDURE — 82746 ASSAY OF FOLIC ACID SERUM: CPT

## 2023-09-12 PROCEDURE — 80053 COMPREHEN METABOLIC PANEL: CPT

## 2023-09-12 PROCEDURE — 84590 ASSAY OF VITAMIN A: CPT | Mod: 90

## 2023-09-12 PROCEDURE — 84425 ASSAY OF VITAMIN B-1: CPT | Mod: 90

## 2023-09-12 PROCEDURE — 86706 HEP B SURFACE ANTIBODY: CPT

## 2023-09-12 PROCEDURE — 83036 HEMOGLOBIN GLYCOSYLATED A1C: CPT

## 2023-09-12 PROCEDURE — 83970 ASSAY OF PARATHORMONE: CPT

## 2023-09-12 PROCEDURE — 36415 COLL VENOUS BLD VENIPUNCTURE: CPT

## 2023-09-12 PROCEDURE — 99000 SPECIMEN HANDLING OFFICE-LAB: CPT

## 2023-09-12 PROCEDURE — 86704 HEP B CORE ANTIBODY TOTAL: CPT

## 2023-09-12 PROCEDURE — 82607 VITAMIN B-12: CPT

## 2023-09-12 PROCEDURE — 80061 LIPID PANEL: CPT

## 2023-09-12 NOTE — TELEPHONE ENCOUNTER
----- Message from SHANNAN Glasgow MD sent at 9/12/2023  2:22 PM CDT -----  Regarding: RE: Critical lab  Will you please try to call her and see if she has symptoms? Abdominal pain? Fatigue? Jaundice?  ----- Message -----  From: Vivian Steiner RN  Sent: 9/12/2023   2:06 PM CDT  To: SHANNAN Glasgow MD  Subject: Critical lab                                     Got a call from the lab about Halina's ALT which is 690.    Vivian Steiner RN, CBN  [facility]  P [phone]  F [phone]

## 2023-09-12 NOTE — TELEPHONE ENCOUNTER
Pt called back to discuss. She does not have abdominal pain or jaundice but is tired. She does not take a large amount of Tylenol on a regular basis (takes it prn) and does not drink alcohol. No herbal supplements and the only vitamins she takes are the ones prescribed by us. Of note, her ferritin level is also very night and this is something that was also noted last year although it's much higher this year.  She says that  had her stop taking her iron for a bit (she can't remember how long) but she has since then been taking it as recommended. I let her know that  put orders in to check some labs for Hepatitis antibodies and they will be added on to the blood she had drawn this morning so she doesn't need to go in again. I asked her to call with any questions or concerns and she verbalized understanding.    Vivian Steiner RN, CBN  [facility]  P [phone]  F [phone]

## 2023-09-12 NOTE — TELEPHONE ENCOUNTER
Left pt a message asking her to call back to discuss her abnormal labs.     Vivian Steiner RN, CBN  [facility]  P [phone]  F [phone]

## 2023-09-13 ENCOUNTER — TELEPHONE (OUTPATIENT)
Dept: SURGERY | Facility: CLINIC | Age: 58
End: 2023-09-13
Payer: COMMERCIAL

## 2023-09-13 DIAGNOSIS — R79.89 ELEVATED LFTS: Primary | ICD-10-CM

## 2023-09-13 DIAGNOSIS — R79.89 ELEVATED FERRITIN: ICD-10-CM

## 2023-09-13 DIAGNOSIS — R74.01 ELEVATED AST (SGOT): ICD-10-CM

## 2023-09-13 DIAGNOSIS — Z98.84 BARIATRIC SURGERY STATUS: ICD-10-CM

## 2023-09-13 DIAGNOSIS — R74.01 ELEVATED ALT MEASUREMENT: ICD-10-CM

## 2023-09-13 DIAGNOSIS — R79.89 ELEVATED FERRITIN: Primary | ICD-10-CM

## 2023-09-13 PROBLEM — Z90.49 HISTORY OF CHOLECYSTECTOMY: Status: ACTIVE | Noted: 2023-09-13

## 2023-09-13 LAB
GGT SERPL-CCNC: 651 U/L (ref 5–36)
HBV CORE AB SERPL QL IA: NONREACTIVE
HBV SURFACE AB SERPL IA-ACNC: 0.69 M[IU]/ML
HBV SURFACE AB SERPL IA-ACNC: NONREACTIVE M[IU]/ML
HBV SURFACE AG SERPL QL IA: NONREACTIVE
HCV AB SERPL QL IA: NONREACTIVE
IRON BINDING CAPACITY (ROCHE): 286 UG/DL (ref 240–430)
IRON SATN MFR SERPL: 36 % (ref 15–46)
IRON SERPL-MCNC: 104 UG/DL (ref 37–145)

## 2023-09-13 NOTE — TELEPHONE ENCOUNTER
LM with pt to coordinate a US abdomen per .   Provided my direct line for pt to call me back, otherwise I also informed her that the order is in so radiology should at some point reach out to schedule.  wants this scan done within the next week.

## 2023-09-13 NOTE — PROGRESS NOTES
[facility] Referral orders faxed to [facility] at [phone].  Cristal Galeana RN

## 2023-09-13 NOTE — TELEPHONE ENCOUNTER
I got in touch with the pt, scheduled US to be done on 09/15- instructions/prep given to pt.   Also made a lab appt on 09/20.     Pt had no further questions.

## 2023-09-14 LAB — ZINC SERPL-MCNC: 80.4 UG/DL

## 2023-09-15 ENCOUNTER — HOSPITAL ENCOUNTER (OUTPATIENT)
Dept: ULTRASOUND IMAGING | Facility: HOSPITAL | Age: 58
Discharge: HOME | End: 2023-09-15
Attending: FAMILY MEDICINE | Admitting: FAMILY MEDICINE
Payer: COMMERCIAL

## 2023-09-15 DIAGNOSIS — R74.01 ELEVATED AST (SGOT): ICD-10-CM

## 2023-09-15 DIAGNOSIS — R74.01 ELEVATED ALT MEASUREMENT: ICD-10-CM

## 2023-09-15 LAB
ANNOTATION COMMENT IMP: NORMAL
RETINYL PALMITATE SERPL-MCNC: <0.02 MG/L
VIT A SERPL-MCNC: 0.61 MG/L
VIT B1 PYROPHOSHATE BLD-SCNC: 216 NMOL/L

## 2023-09-15 PROCEDURE — 76700 US EXAM ABDOM COMPLETE: CPT

## 2023-09-19 NOTE — RESULT ENCOUNTER NOTE
Discussed with Dr. Gian REECE GI on 9/13/23. He recommended abdominal US. Recheck labs in 1 week. They will be contacting her to set up appointment.

## 2023-09-20 ENCOUNTER — LAB (OUTPATIENT)
Dept: LAB | Facility: CLINIC | Age: 58
End: 2023-09-20
Payer: COMMERCIAL

## 2023-09-20 DIAGNOSIS — R79.89 ELEVATED FERRITIN: ICD-10-CM

## 2023-09-20 DIAGNOSIS — R79.89 ELEVATED LFTS: ICD-10-CM

## 2023-09-20 LAB
ALBUMIN SERPL BCG-MCNC: 3.8 G/DL (ref 3.5–5.2)
ALP SERPL-CCNC: 262 U/L (ref 35–104)
ALT SERPL W P-5'-P-CCNC: 794 U/L (ref 0–50)
ANION GAP SERPL CALCULATED.3IONS-SCNC: 12 MMOL/L (ref 7–15)
AST SERPL W P-5'-P-CCNC: 1132 U/L (ref 0–45)
BILIRUB SERPL-MCNC: 0.4 MG/DL
BUN SERPL-MCNC: 13 MG/DL (ref 6–20)
CALCIUM SERPL-MCNC: 8.9 MG/DL (ref 8.6–10)
CHLORIDE SERPL-SCNC: 106 MMOL/L (ref 98–107)
CREAT SERPL-MCNC: 0.89 MG/DL (ref 0.51–0.95)
DEPRECATED HCO3 PLAS-SCNC: 25 MMOL/L (ref 22–29)
EGFRCR SERPLBLD CKD-EPI 2021: 75 ML/MIN/1.73M2
FERRITIN SERPL-MCNC: 3408 NG/ML (ref 11–328)
GLUCOSE SERPL-MCNC: 106 MG/DL (ref 70–99)
INR PPP: 3.14 (ref 0.85–1.15)
POTASSIUM SERPL-SCNC: 4.3 MMOL/L (ref 3.4–5.3)
PROT SERPL-MCNC: 6.7 G/DL (ref 6.4–8.3)
SODIUM SERPL-SCNC: 143 MMOL/L (ref 136–145)

## 2023-09-20 PROCEDURE — 85610 PROTHROMBIN TIME: CPT

## 2023-09-20 PROCEDURE — 80053 COMPREHEN METABOLIC PANEL: CPT

## 2023-09-20 PROCEDURE — 82728 ASSAY OF FERRITIN: CPT

## 2023-09-20 PROCEDURE — 36415 COLL VENOUS BLD VENIPUNCTURE: CPT

## 2023-09-20 PROCEDURE — 83516 IMMUNOASSAY NONANTIBODY: CPT | Mod: 90

## 2023-09-20 PROCEDURE — 99000 SPECIMEN HANDLING OFFICE-LAB: CPT

## 2023-09-20 PROCEDURE — 86381 MITOCHONDRIAL ANTIBODY EACH: CPT

## 2023-09-20 PROCEDURE — 86038 ANTINUCLEAR ANTIBODIES: CPT

## 2023-09-21 DIAGNOSIS — R79.89 ABNORMAL LFTS: Primary | ICD-10-CM

## 2023-09-21 LAB — ANA SER QL IF: NEGATIVE

## 2023-09-21 NOTE — RESULT ENCOUNTER NOTE
Discussed further elevation of LFTs and ferritin with patient. She has an appointment with GI mid October. She is tired but otherwise feeling well. I would like her to get an Abdominal CT and will place an order. Can you help expedite this or find someone who can?

## 2023-09-22 LAB — MITOCHONDRIA M2 IGG SER-ACNC: <1 U/ML

## 2023-09-23 LAB — SMA IGG SER-ACNC: 5 UNITS

## 2023-09-26 ENCOUNTER — VIRTUAL VISIT (OUTPATIENT)
Dept: SURGERY | Facility: CLINIC | Age: 58
End: 2023-09-26
Payer: COMMERCIAL

## 2023-09-26 ENCOUNTER — HOSPITAL ENCOUNTER (OUTPATIENT)
Dept: CT IMAGING | Facility: HOSPITAL | Age: 58
Discharge: HOME | End: 2023-09-26
Attending: FAMILY MEDICINE | Admitting: FAMILY MEDICINE
Payer: COMMERCIAL

## 2023-09-26 DIAGNOSIS — R79.89 ABNORMAL LFTS: ICD-10-CM

## 2023-09-26 DIAGNOSIS — R79.89 ELEVATED LFTS: ICD-10-CM

## 2023-09-26 DIAGNOSIS — E66.01 MORBID OBESITY (H): ICD-10-CM

## 2023-09-26 DIAGNOSIS — K91.2 POSTOPERATIVE MALABSORPTION: Primary | ICD-10-CM

## 2023-09-26 DIAGNOSIS — R79.89 ELEVATED FERRITIN: ICD-10-CM

## 2023-09-26 LAB — RADIOLOGIST FLAGS: ABNORMAL

## 2023-09-26 PROCEDURE — 74177 CT ABD & PELVIS W/CONTRAST: CPT

## 2023-09-26 PROCEDURE — 250N000011 HC RX IP 250 OP 636: Mod: JZ | Performed by: FAMILY MEDICINE

## 2023-09-26 PROCEDURE — 99215 OFFICE O/P EST HI 40 MIN: CPT | Mod: VID | Performed by: FAMILY MEDICINE

## 2023-09-26 RX ORDER — IOPAMIDOL 755 MG/ML
90 INJECTION, SOLUTION INTRAVASCULAR ONCE
Status: COMPLETED | OUTPATIENT
Start: 2023-09-26 | End: 2023-09-26

## 2023-09-26 RX ORDER — CLINDAMYCIN HCL 300 MG
CAPSULE ORAL
COMMUNITY

## 2023-09-26 RX ADMIN — IOPAMIDOL 90 ML: 755 INJECTION, SOLUTION INTRAVENOUS at 07:47

## 2023-09-26 NOTE — PROGRESS NOTES
Halina Ibrahim is 57 year old  female who presents for a billable video visit today.    How would you like to obtain your AVS? MyChart  If dropped from the video visit, the video invitation should be resent by: Text to cell phone: [phone]  Will anyone else be joining your video visit? No      Video Start Time:  12:00 pm          Video-Visit Details    Type of service:  Video Visit    Platform used for Video Visit: [employer]    Video End Time (time video stopped): 12:28 PM    Originating Location (pt. Location): Home      Distant Location (provider location):  On-site    Distant Location (provider location):  [facility] BARIATR[facility]    Bariatric Care Clinic Follow Up Visit for Previous Bariatric Surgery   Date of visit: 9/26/2023  Physician: DURAN Glasgow MD, MD  Primary Care is Lu Carrillo  Halina Ibrahim   57 year old  female    Date of Surgery: 9/14/2004  Initial Weight: 296#  Today's Weight:   Wt Readings from Last 1 Encounters:   08/01/23 119.5 kg (263 lb 6.4 oz)     There is no height or weight on file to calculate BMI.       Assessment and Plan   Assessment: Halina is a 57 year old year old female who is 19 years s/p RNY gastric bypass with Dr. Macario. They have had a durable weight loss of 33 lbs since surgery.  Overall compliance with the [facility] Bariatric Surgery Program has been excellent.      Plan:    1. Postoperative malabsorption  Patient is taking all vitamins as directed.  Recent routine postoperative labs were reviewed with very high AST, ALT, Alk Phos and ferritin. Discussed with hepatology last week. She will be seeing Maame Decker there October 12. Hepatitis serologies negative. US negative. Abdominal CT done today has not yet been read. I will inform her primary MD. In the meantime she will stay off of alcohol, tylenol and will stop her iron supplementation. She was reminded to slow down, chew foods thoroughly, and to avoid liquids  within 30 minutes of solids. Written information was given. She was congratulated on her success thus far.     2. Morbid obesity (H)  She will stay off all appetite suppressants medications at this time. We could consider resuming saxenda or starting wegovy. She could also restart topamax but will wait for now.    3. Elevated LFTs  See above    4. Elevated ferritin  See above.          Total time spent on the date of this encounter doing: chart review, review of test results, patient visit, physical exam, education, counseling, developing plan of care and documenting = 46 minutes.      Bariatric Surgery Review   Interim History/LifeChanges: patient was taking saxenda for appetite and craving control.  She has been unable to get it so she has been off for over one month. She was noticing appetite suppression with it.    Patient Concerns: lab abnormalities, fatigue    GERD Worse when saxenda. She never got higher than 1.8 mg due to this.     Medication changes: stopped saxenda    Vitamin Intake:   Multivitamin   Twice a day, contains no iron, was taking chelated iron 36 mg, 2 twice per day, now taking 1 capsule twice a day   Vitamin D  10,000 international unit(s) once a day   Calcium  Citrate twice a day   Vit. B-12    Super B complex twice a day   Biotin once a day    Habits:            Alcohol Intake  none   NSAID Use  Rare tylenol (2 per month max)   Caffeine Use  Soda (1-2 cans per day)   Exercise  ADLs, yard work   CPAP Use:  yes   Birth Control  menopause   Tobacco Use     none                                      LABS: reviewed, very high ferritin, ALT, AST       LABS:  Lab Results   Component Value Date    WBC 6.1 09/12/2023    HGB 12.8 09/12/2023    HCT 39.1 09/12/2023    MCV 86 09/12/2023     09/12/2023      No components found for: [id number] Lab Results   Component Value Date    HGBA1C 5.6 09/06/2019      Lab Results   Component Value Date    CHOL 177 09/12/2023    No components found for: PTH       No components found for: FERRITIN   Lab Results   Component Value Date    HDL 63 09/12/2023      No components found for: [id number] No components found for: 08934   No components found for: LDLCALC No results found for: TSH No components found for: FOLATE   Lab Results   Component Value Date    TRIG 116 09/12/2023    Lab Results   Component Value Date     (HH) 09/20/2023    AST 1,132 (HH) 09/20/2023     (H) 09/12/2023    ALKPHOS 262 (H) 09/20/2023    No results found for: TESTOSTERONE     No results found for: CHOLHDL No components found for: 7597   @resusfast(vitamin a: 1)@             Patient Profile   Social History     Social History Narrative    Not on file        Past Medical History   Past Medical History:   Diagnosis Date    Aortic regurgitation     Aortic valve replaced     Degenerative disc disease     Dyslipidemia     Hashimoto's disease     History of Sabrina-en-Y gastric bypass 9/14/2004    Dr. Macario Highest weight 296#    Hypertension     Low back pain     Metabolic syndrome     Mitral valve replaced     Morbid obesity (H)     Morbid obesity with BMI of 40.0-44.9, adult (H)     Other and unspecified postsurgical nonabsorption     S/P gastric bypass     S/P MVR (mitral valve replacement)     Stroke (H)     Stroke (H)     from mass near mitral valve     Patient Active Problem List   Diagnosis    Aortic valve disorder    S/P AVR    Restless leg syndrome    Morbid obesity (H)    History of cholecystectomy       Past Surgical History  She has a past surgical history that includes s/p mitral valve replacement[; s/p gastric bypass[; Redo sternotomy replace valve aortic (3/14/2013); tubal ligation; other surgical history; Revision Sabrina-En-Y; aortic valve replacement; and Replace valve mitral.     Examination   There were no vitals taken for this visit.    GENERAL: Healthy, alert and no distress  EYES: Eyes grossly normal to inspection.  No discharge or erythema, or obvious scleral/conjunctival  abnormalities.  RESP: No audible wheeze, cough, or visible cyanosis.  No visible retractions or increased work of breathing.    SKIN: Visible skin clear. No significant rash, abnormal pigmentation or lesions.  NEURO: Cranial nerves grossly intact.  Mentation and speech appropriate for age.  PSYCH: Mentation appears normal, affect normal/bright, judgement and insight intact, normal speech and appearance well-groomed.          Counseling:   We reviewed the important post op bariatric recommendations:  -eating 3 meals daily  -eating protein first, getting >60gm protein daily  -eating slowly, chewing food well  -avoiding/limiting calorie containing beverages  -drinking water 15-30 minutes before or after meals  -choosing wheat, not white with breads, crackers, pastas, madelyn, bagels, tortillas, rice  -limiting restaurant or cafeteria eating to twice a week or less    We discussed the importance of restorative sleep and stress management in maintaining a healthy weight.  We discussed the National Weight Control Registry healthy weight maintenance strategies and ways to optimize metabolism.  We discussed the importance of physical activity including cardiovascular and strength training in maintaining a healthier weight.  We discussed the importance of life-long vitamin supplementation and life-long  follow-up.    Halina was reminded that, to avoid marginal ulcers she should avoid tobacco at all, alcohol in excess, caffeine in excess, and NSAIDS (unless indicated for cardioprotection or othewise and opposed by a PPI).    DURAN Glasgow MD  [facility] Bariatric clinic  9/26/2023  6:36 AM            No images are attached to the encounter.

## 2023-09-26 NOTE — PATIENT INSTRUCTIONS
[facility]  Nutritional Guidelines  Gastric Bypass 18 Months Post Op and Beyond    General Guidelines and Helpful Hints:  Eat 3 meals per day + protein supplement(s). No snacks between meals.  Do not skip meals.  This can cause overeating at the next meal and will prevent adequate protein and nutritional intake.  Aim for 60-80 grams of protein per day.  Always eat your protein first. This assists with optimal nutrition and helps you stay full longer.  Depending on your portion size, you may need to drink approved protein supplement between meals to achieve protein goals. Follow recommendations of your Dietitian.   Eat your protein first, and then follow with fiber.   It is not necessary to count your fiber, but 15-20 grams per day is recommended.    Add fiber by including fruits, vegetables, whole grains, and beans.   Portions should remain about 1 cup per meal. Use measuring cups to be accurate.  Continue to use saucer/salad plates, infant/toddler silverware to keep portion sizes small and take small bites.  Eat S-L-O-W-L-Y to make each meal last 20-30 minutes. Always stop eating when satisfied.  Continue to use caution with foods containing skins, peels or membranes. Chew well!  Aim for 64 oz. of calorie-free fluids daily.  Continue to avoid caffeine and carbonation. If you choose to drink alcohol, do so in moderation.   Remember to avoid drinking during meals, 15-30 minutes before and 30 minutes after.  Exercise is brady for continued weight loss and weight maintenance. Aim for 30-60 minutes of physical activity most days of the week. Include cardiovascular and strength training.  If having trouble tolerating meat, try using a crock-pot, tinfoil tent, steamer or other moist cooking method to create tender meats. Add broth or low-fat gravy to help meat stay moist.   Avoid high sugar and high fat foods to prevent dumping syndrome.  Check nutrition labels for less than 10 grams of sugar and less  than 10 grams of fat per serving.  Continue Taking Vitamins/Minerals:  0604-3758 mcg of Sublingual B-12 daily  1 Multivitamin with Iron twice daily (chewable or swallow tabs)  500-600 mg Calcium Citrate twice daily (chewable or swallow tabs)  5000 IU Vitamin D3 daily  An additional iron tablet for menstruating females  You may also need an additional thiamine or B Complex tablet    Sample Grocery List    Protein:  Fat free Greek or light yogurt (less than 10 grams sugar)  Fat free or low-fat cottage cheese  String cheese or reduced fat cheese slices  Tuna, salmon, crab, egg, or chicken salad made with light or fat free mayonnaise  Egg or Egg Substitute  Lean/extra lean turkey, beef, bison, venison (ground, sirloin, round, flank)  Pork loin or tenderloin (grilled, baked, broiled)  Fish such as salmon, tuna, trout, tilapia, etc. (grilled, baked, broiled)  Tender cuts of lean (skinless) turkey or chicken  Lean deli meats: turkey, lean ham, chicken, lean roast beef  Beans such as kidney, garbanzo, black, [place], or low-fat/fat free refried beans  Peanut butter (natural preferred). Limit to 1 Tbsp. per day.  Low-fat meatloaf (made with lean ground beef or turkey)  Sloppy Joes made with low-sugar ketchup and lean ground beef or turkey  Soy or vegetable protein (i.e. vegan crumbles, soy/veggie burger, tofu)  Hummus    Vegetables:  Fresh: cooked or raw (as tolerated)  Frozen vegetables  Canned vegetables (low sodium or no salt added, rinse before cooking/eating)  (Ok to have skins/peels/membranes/seeds - just chew well)    Fruits:  Fresh fruit  Frozen fruit (no sugar added)  Canned fruit (packed in its own juice, NOT syrup)  (Ok to have skins/peels/membranes/seeds - just chew well)    Starch:  Unsweetened whole-grain hot cereal (or high fiber cold cereal, dry)  Toasted whole wheat bread or [place] Thins  Whole grain crackers  Baked /boiled/mashed potato/sweet potato  Cooked whole grain pasta, brown rice, or other cooked  whole grains  Starchy vegetables: corn, peas, winter squash    Protein Supplement:   Ready to drink protein shake with:  15-30 grams protein per serving  Less than 10 grams total carbohydrate per serving   Protein powder mixed with:   Skim or 1% milk  Low fat or fat free Lactaid milk, plain or no sugar added soymilk  Water     Fats: (use in moderation)  1 teaspoon of soft tub margarine  1 teaspoon olive oil, canola oil, or peanut oil  1 tablespoon of low-fat deluca or salad dressing     Sample Menu for 18+ months after Gastric Bypass    You do NOT need to eat/drink the full portion sizes listed below  Always stop when you are satisfied    Breakfast   cup 1% cottage cheese     cup mixed berries   Lunch 2 oz lean roast beef on   [place] Thin with 1 tsp. light deluca    small tomato, chopped, mixed with 1 tsp. light vinaigrette dressing   Supplement Approved protein supplement (if needed between meals)   Dinner 2 oz grilled salmon    cup salad greens with 1 tsp. light salad dressing and 1 tsp. ground flax seed    cup quinoa or brown rice     Breakfast   cup egg substitute with   cup sautéed chopped vegetables  2 light [place] Krisp crackers   Lunch Tuna Melt:   cup tuna mixed with 1 tsp. light deluca over   [place] Thin. Top with 2-3 slices cucumber and 1 oz slice of low fat cheese   Supplement 1 cup skim milk (if needed between meals)   Dinner 3 oz  grilled, broiled, or baked seasoned skinless chicken breast    cup asparagus     Breakfast   cup plain oatmeal made with skim or 1% milk with 1 Tbsp. flavored/unflavored protein powder added  1 mozzarella string cheese   Lunch 2 oz deli turkey breast  1/3 cup salad with 1 tsp. light salad dressing, 1/8 of a whole avocado and 1 Tbsp. sunflower seeds   Dinner 3 oz. pork loin made in a crock pot, seasoned with a spice rub    cup cooked carrots   Supplement Approved protein supplement (if needed between meals)     Breakfast 1 cup breakfast casserole made with egg substitute, turkey  sausage,  and steamed, chopped bell peppers   Supplement  1 cup light Greek yogurt (if needed between meals)   Lunch 2 oz. teriyaki turkey    cup mashed sweet potato with 1-2 spritzes of spray butter (like [place])    cup fresh pineapple   Dinner 3 oz low fat meatloaf    cup roasted garlic zucchini     Breakfast   cup leftover breakfast casserole    cup no sugar added applesauce with 1 Tbsp. unflavored protein powder and a sprinkle of cinnamon    Lunch 3 oz shrimp with 1-2 Tbsp. low-sugar cocktail sauce for dipping    c. whole wheat pasta drizzled with   tsp. olive oil   Supplement 1 cup skim/1% milk with scoop of protein powder (if needed between meals)   Dinner Grilled, seasoned kebob with 2 oz lean beef and   cup vegetables     Breakfast Breakfast pizza:   [place] Thin spread with 1 Tbsp. low sugar spaghetti sauce,   cup shredded low fat cheese, melted and 1 slice of [place]     cup fresh fruit mixed with chopped almonds   Lunch   cup black bean soup  4-5 whole grain crackers   Dinner 3 oz  tilapia with lemon pepper seasoning    cup stewed tomatoes   Supplement 1 string cheese (if needed between meals)     Breakfast 2 hard boiled eggs (discard 1 egg yolk)    whole wheat English Muffin with 1 tsp. low sugar jelly   Lunch   cup leftover black bean soup topped with 1-2 Tbsp. low fat cheese  2-3 light [place] Krisp crackers   Supplement Approved protein supplement (if needed between meals)   Dinner 3 oz sirloin steak    cup steamed broccoli

## 2023-09-26 NOTE — LETTER
9/26/2023         RE: Halina Ibrahim  [address] [facility] [address]        Dear Colleague,    [facility] you for [facility] your patient, Halina Ibrahim, [facility] Please see a copy of my visit note below.    Halina Ibrahim is 57 year old  female who presents for a billable video visit today.    How would you like to obtain your AVS? MyChart  If dropped from the video visit, the video invitation should be resent by: Text to cell phone: [phone]  Will anyone else be joining your video visit? No      Video Start Time:  12:00 pm          Video-Visit Details    Type of service:  Video Visit    Platform used for Video Visit: [employer]    Video End Time (time video stopped): 12:28 PM    Originating Location (pt. Location): Home      Distant Location (provider location):  On-site    Distant Location (provider location):  [facility] SURGERY [facility]    Bariatric Care Clinic Follow Up Visit for Previous Bariatric Surgery   Date of visit: 9/26/2023  Physician: DURAN Glasgow MD, MD  Primary Care is Lu Carrillo  Halina Ibrahim   57 year old  female    Date of Surgery: 9/14/2004  Initial Weight: 296#  Today's Weight:   Wt Readings from Last 1 Encounters:   08/01/23 119.5 kg (263 lb 6.4 oz)     There is no height or weight on file to calculate BMI.       Assessment and Plan   Assessment: Halina is a 57 year old year old female who is 19 years s/p RNY gastric bypass with Dr. Macario. They have had a durable weight loss of 33 lbs since surgery.  Overall compliance with the [facility] Bariatric Surgery Program has been excellent.      Plan:    1. Postoperative malabsorption  Patient is taking all vitamins as directed.  Recent routine postoperative labs were reviewed with very high AST, ALT, Alk Phos and ferritin. Discussed with hepatology last week. She will be seeing Maame Decker there October 12. Hepatitis  serologies negative. US negative. Abdominal CT done today has not yet been read. I will inform her primary MD. In the meantime she will stay off of alcohol, tylenol and will stop her iron supplementation. She was reminded to slow down, chew foods thoroughly, and to avoid liquids within 30 minutes of solids. Written information was given. She was congratulated on her success thus far.     2. Morbid obesity (H)  She will stay off all appetite suppressants medications at this time. We could consider resuming saxenda or starting wegovy. She could also restart topamax but will wait for now.    3. Elevated LFTs  See above    4. Elevated ferritin  See above.          Total time spent on the date of this encounter doing: chart review, review of test results, patient visit, physical exam, education, counseling, developing plan of care and documenting = 46 minutes.      Bariatric Surgery Review   Interim History/LifeChanges: patient was taking saxenda for appetite and craving control.  She has been unable to get it so she has been off for over one month. She was noticing appetite suppression with it.    Patient Concerns: lab abnormalities, fatigue    GERD Worse when saxenda. She never got higher than 1.8 mg due to this.     Medication changes: stopped saxenda    Vitamin Intake:   Multivitamin   Twice a day, contains no iron, was taking chelated iron 36 mg, 2 twice per day, now taking 1 capsule twice a day   Vitamin D  10,000 international unit(s) once a day   Calcium  Citrate twice a day   Vit. B-12    Super B complex twice a day   Biotin once a day    Habits:            Alcohol Intake  none   NSAID Use  Rare tylenol (2 per month max)   Caffeine Use  Soda (1-2 cans per day)   Exercise  ADLs, yard work   CPAP Use:  yes   Birth Control  menopause   Tobacco Use     none                                      LABS: reviewed, very high ferritin, ALT, AST       LABS:  Lab Results   Component Value Date    WBC 6.1 09/12/2023    HGB  12.8 09/12/2023    HCT 39.1 09/12/2023    MCV 86 09/12/2023     09/12/2023      No components found for: [id number] Lab Results   Component Value Date    HGBA1C 5.6 09/06/2019      Lab Results   Component Value Date    CHOL 177 09/12/2023    No components found for: PTH      No components found for: FERRITIN   Lab Results   Component Value Date    HDL 63 09/12/2023      No components found for: [id number] No components found for: 40073   No components found for: LDLCALC No results found for: TSH No components found for: FOLATE   Lab Results   Component Value Date    TRIG 116 09/12/2023    Lab Results   Component Value Date     (HH) 09/20/2023    AST 1,132 (HH) 09/20/2023     (H) 09/12/2023    ALKPHOS 262 (H) 09/20/2023    No results found for: TESTOSTERONE     No results found for: CHOLHDL No components found for: 7597   @resusfast(vitamin a: 1)@             Patient Profile   Social History     Social History Narrative     Not on file        Past Medical History   Past Medical History:   Diagnosis Date     Aortic regurgitation      Aortic valve replaced      Degenerative disc disease      Dyslipidemia      Hashimoto's disease      History of Sabrina-en-Y gastric bypass 9/14/2004    Dr. Macario Highest weight 296#     Hypertension      Low back pain      Metabolic syndrome      Mitral valve replaced      Morbid obesity (H)      Morbid obesity with BMI of 40.0-44.9, adult (H)      Other and unspecified postsurgical nonabsorption      S/P gastric bypass      S/P MVR (mitral valve replacement)      Stroke (H)      Stroke (H)     from mass near mitral valve     Patient Active Problem List   Diagnosis     Aortic valve disorder     S/P AVR     Restless leg syndrome     Morbid obesity (H)     History of cholecystectomy       Past Surgical History  She has a past surgical history that includes s/p mitral valve replacement[; s/p gastric bypass[; Redo sternotomy replace valve aortic (3/14/2013); tubal  ligation; other surgical history; Revision Sabrina-En-Y; aortic valve replacement; and Replace valve mitral.     Examination   There were no vitals taken for this visit.    GENERAL: Healthy, alert and no distress  EYES: Eyes grossly normal to inspection.  No discharge or erythema, or obvious scleral/conjunctival abnormalities.  RESP: No audible wheeze, cough, or visible cyanosis.  No visible retractions or increased work of breathing.    SKIN: Visible skin clear. No significant rash, abnormal pigmentation or lesions.  NEURO: Cranial nerves grossly intact.  Mentation and speech appropriate for age.  PSYCH: Mentation appears normal, affect normal/bright, judgement and insight intact, normal speech and appearance well-groomed.          Counseling:   We reviewed the important post op bariatric recommendations:  -eating 3 meals daily  -eating protein first, getting >60gm protein daily  -eating slowly, chewing food well  -avoiding/limiting calorie containing beverages  -drinking water 15-30 minutes before or after meals  -choosing wheat, not white with breads, crackers, pastas, madelyn, bagels, tortillas, rice  -limiting restaurant or cafeteria eating to twice a week or less    We discussed the importance of restorative sleep and stress management in maintaining a healthy weight.  We discussed the National Weight Control Registry healthy weight maintenance strategies and ways to optimize metabolism.  We discussed the importance of physical activity including cardiovascular and strength training in maintaining a healthier weight.  We discussed the importance of life-long vitamin supplementation and life-long  follow-up.    Halina was reminded that, to avoid marginal ulcers she should avoid tobacco at all, alcohol in excess, caffeine in excess, and NSAIDS (unless indicated for cardioprotection or othewise and opposed by a PPI).    DURAN Glasgow MD  [facility] Bariatric clinic  9/26/2023  6:36 AM            No images are  attached to the encounter.      Again, thank you for allowing me to participate in the care of your patient.        Sincerely,        DURAN Glasgow MD

## 2023-10-10 ENCOUNTER — MYC MEDICAL ADVICE (OUTPATIENT)
Dept: SURGERY | Facility: CLINIC | Age: 58
End: 2023-10-10
Payer: COMMERCIAL

## 2023-10-10 VITALS — BODY MASS INDEX: 43.23 KG/M2 | HEIGHT: 66 IN | WEIGHT: 269 LBS

## 2023-10-10 NOTE — TELEPHONE ENCOUNTER
Chart updated with current weight.     Vivian Steiner RN, CBN  [facility]  P [phone]  F [phone]

## 2023-10-12 ENCOUNTER — HOSPITAL ENCOUNTER (OUTPATIENT)
Dept: MRI IMAGING | Facility: HOSPITAL | Age: 58
Discharge: HOME | End: 2023-10-12
Attending: FAMILY MEDICINE | Admitting: FAMILY MEDICINE
Payer: COMMERCIAL

## 2023-10-12 DIAGNOSIS — R79.89 ELEVATED LFTS: ICD-10-CM

## 2023-10-12 PROCEDURE — 74181 MRI ABDOMEN W/O CONTRAST: CPT

## 2023-10-18 ENCOUNTER — VIRTUAL VISIT (OUTPATIENT)
Dept: SURGERY | Facility: CLINIC | Age: 58
End: 2023-10-18
Payer: COMMERCIAL

## 2023-10-18 DIAGNOSIS — E66.01 OBESITY, CLASS III, BMI 40-49.9 (MORBID OBESITY) (H): ICD-10-CM

## 2023-10-18 DIAGNOSIS — Z98.84 S/P GASTRIC BYPASS: Primary | ICD-10-CM

## 2023-10-18 DIAGNOSIS — Z71.3 NUTRITIONAL COUNSELING: ICD-10-CM

## 2023-10-18 DIAGNOSIS — K91.2 POSTOPERATIVE MALABSORPTION: ICD-10-CM

## 2023-10-18 PROCEDURE — 97803 MED NUTRITION INDIV SUBSEQ: CPT | Mod: VID | Performed by: DIETITIAN, REGISTERED

## 2023-10-18 NOTE — PROGRESS NOTES
"Halina Ibrahim is a 58 year old who is being evaluated via a billable video visit.      What phone number would you like to be contacted at? [phone]  How would you like to obtain your AVS? MyCralpht    Post-op Surgical Weight Loss Diet Evaluation     Assessment:  Pt presents for  19 years  post-op RD visit, s/p RNY ЕЛЕНА on 9/14/2004. Today we reviewed current eating habits and level of physical activity, and instructed on the changes that are required for successful bariatric outcomes.    Patient Progress: Just found out that she has to have stones removed from her bile duct, awaiting to have surgery scheduled for this. Has struggled with obtaining the Saxenda, noting that she has been waiting for it over the past 2 months.  Patient notes that she had been having stomach issues with it, therefore had Dr Glasgow discontinue it.  Feels that overall things are going well in regards to protein intake, trying to eat more greek yogurt and cottage cheese.  Continues to push fluids throughout the day, mainly consuming water and consuming at least 56 oz/day.  Patient reports that she may have pop in addition, working on reduced consumption.      Initial weight: 269 lbs  Current weight: 296 lbs  Weight change: maintained over the past 3 months    There is no height or weight on file to calculate BMI.    Patient Active Problem List   Diagnosis    Aortic valve disorder    S/P AVR    Restless leg syndrome    Morbid obesity (H)    History of cholecystectomy         Vitamins   Multi Vit with Iron: yes  Calcium Citrate: yes  B12: yes  D3: yes      Exercise: no set regimen-yardwork/clean up more recently      PES statement:    Obesity (NC 3.3) related to overeating and poor lifestyle habits as evidenced by patient's subjective statements and BMI 43.5 kg/m2.     Intervention    Discussion  Recommended to consume 15-20gm protein at 3 meals daily, along with protein supplement/\"planned protein containing snack\" of 15-30gm protein, to " "reach goal of 60-80 gm protein daily.  Educated on post-op vitamin regimen: Multi Vit + iron 2x/day, calcium citrate 400-600 mg 2x/day, 7060-8514 mcg of Sublingual B-12 daily, and 5000 IU Vitamin D3 daily (MVI and calcium can be taken at the same time BID)  Reviewed lean protein sources  Bariatric Plate Method-  including lean/low fat protein at each meal, including a vegetable/fruit, and limiting carbohydrate intake to less than 25% of plate volume.     Instructions  Include 15-20gm protein at each meal, along with protein supplement/\"planned protein containing snack\" of 15-30gm protein, to reach goal of 60-80 gm protein daily.  Increase fluid intake to 64oz daily: choose plain or calorie/carbonation-free beverages.  Incorporate daily structured activity, 30-60 minutes most days of the week  Recommended pt to start taking: Multi Vit + iron 2x/day, calcium citrate 400-600 mg 2x/day, 6200-7820 mcg of Sublingual B-12 daily, and 5000 IU Vitamin D3 daily. (MVI and calcium can be taken at the same time)  Read food labels more consistently: keeping total fat grams <10, total sugar grams <10, fiber >3gm per serving.  Increase vegetable/fruit intake, by having a vegetable or fruit with each meal daily.  Practice plate method: 1/2 plate lean/low fat protein source, vegetable/fruit, <25% of plate complex carbohydrates.  Separate fluids 30 minutes before/after meal times.  Practice eating off of smaller plates/bowls, chewing to applesauce consistency, taking 20-30 minutes to eat in a calm/relaxed environment without distractions of tv/email/cell phone.    Handouts provided:  None     Assessment/Plan:    Pt to follow up in 3 months with the Dietitian       Phone call duration: 22 minutes      Originating Location (pt. Location): Home      Distant Location (provider location):  Off-site      Krystle Ballard RD          "

## 2023-10-18 NOTE — LETTER
10/18/2023         RE: Halina Ibrahim  [address] [address]        Dear Colleague,    Thank you for referring your patient, Halina Ibrahim, to the [facility] AND [facility] [place]. Please see a copy of my visit note below.    Halina Ibrahim is a 58 year old who is being evaluated via a billable video visit.      What phone number would you like to be contacted at? [phone]  How would you like to obtain your AVS? MyChart    Post-op Surgical Weight Loss Diet Evaluation     Assessment:  Pt presents for  19 years  post-op RD visit, s/p RNY GB on 9/14/2004. Today we reviewed current eating habits and level of physical activity, and instructed on the changes that are required for successful bariatric outcomes.    Patient Progress: Just found out that she has to have stones removed from her bile duct, awaiting to have surgery scheduled for this. Has struggled with obtaining the Saxenda, noting that she has been waiting for it over the past 2 months.  Patient notes that she had been having stomach issues with it, therefore had Dr Glasgow discontinue it.  Feels that overall things are going well in regards to protein intake, trying to eat more greek yogurt and cottage cheese.  Continues to push fluids throughout the day, mainly consuming water and consuming at least 56 oz/day.  Patient reports that she may have pop in addition, working on reduced consumption.      Initial weight: 269 lbs  Current weight: 296 lbs  Weight change: maintained over the past 3 months    There is no height or weight on file to calculate BMI.    Patient Active Problem List   Diagnosis     Aortic valve disorder     S/P AVR     Restless leg syndrome     Morbid obesity (H)     History of cholecystectomy         Vitamins   Multi Vit with Iron: yes  Calcium Citrate: yes  B12: yes  D3: yes      Exercise: no set regimen-yardwork/clean up more recently      PES statement:    Obesity (NC 3.3) related to  "overeating and poor lifestyle habits as evidenced by patient's subjective statements and BMI 43.5 kg/m2.     Intervention    Discussion  Recommended to consume 15-20gm protein at 3 meals daily, along with protein supplement/\"planned protein containing snack\" of 15-30gm protein, to reach goal of 60-80 gm protein daily.  Educated on post-op vitamin regimen: Multi Vit + iron 2x/day, calcium citrate 400-600 mg 2x/day, 2260-3882 mcg of Sublingual B-12 daily, and 5000 IU Vitamin D3 daily (MVI and calcium can be taken at the same time BID)  Reviewed lean protein sources  Bariatric Plate Method-  including lean/low fat protein at each meal, including a vegetable/fruit, and limiting carbohydrate intake to less than 25% of plate volume.     Instructions  Include 15-20gm protein at each meal, along with protein supplement/\"planned protein containing snack\" of 15-30gm protein, to reach goal of 60-80 gm protein daily.  Increase fluid intake to 64oz daily: choose plain or calorie/carbonation-free beverages.  Incorporate daily structured activity, 30-60 minutes most days of the week  Recommended pt to start taking: Multi Vit + iron 2x/day, calcium citrate 400-600 mg 2x/day, 3906-1385 mcg of Sublingual B-12 daily, and 5000 IU Vitamin D3 daily. (MVI and calcium can be taken at the same time)  Read food labels more consistently: keeping total fat grams <10, total sugar grams <10, fiber >3gm per serving.  Increase vegetable/fruit intake, by having a vegetable or fruit with each meal daily.  Practice plate method: 1/2 plate lean/low fat protein source, vegetable/fruit, <25% of plate complex carbohydrates.  Separate fluids 30 minutes before/after meal times.  Practice eating off of smaller plates/bowls, chewing to applesauce consistency, taking 20-30 minutes to eat in a calm/relaxed environment without distractions of tv/email/cell phone.    Handouts provided:  None     Assessment/Plan:    Pt to follow up in 3 months with the Dietitian "       Phone call duration: 22 minutes      Originating Location (pt. Location): Home      Distant Location (provider location):  Off-site      Krystle Ballard RD            Again, thank you for allowing me to participate in the care of your patient.        Sincerely,        Krystle Ballard RD

## 2024-01-09 DIAGNOSIS — R79.89 ELEVATED FERRITIN: Primary | ICD-10-CM

## 2024-01-09 DIAGNOSIS — R79.89 ELEVATED LFTS: ICD-10-CM

## 2024-01-15 ENCOUNTER — LAB (OUTPATIENT)
Dept: LAB | Facility: CLINIC | Age: 59
End: 2024-01-15
Payer: COMMERCIAL

## 2024-01-15 DIAGNOSIS — R79.89 ELEVATED LFTS: ICD-10-CM

## 2024-01-15 DIAGNOSIS — R79.89 ELEVATED FERRITIN: ICD-10-CM

## 2024-01-15 LAB
ALBUMIN SERPL BCG-MCNC: 3.9 G/DL (ref 3.5–5.2)
ALP SERPL-CCNC: 102 U/L (ref 40–150)
ALT SERPL W P-5'-P-CCNC: 27 U/L (ref 0–50)
ANION GAP SERPL CALCULATED.3IONS-SCNC: 7 MMOL/L (ref 7–15)
AST SERPL W P-5'-P-CCNC: 33 U/L (ref 0–45)
BILIRUB SERPL-MCNC: 0.3 MG/DL
BUN SERPL-MCNC: 10.8 MG/DL (ref 6–20)
CALCIUM SERPL-MCNC: 9.5 MG/DL (ref 8.6–10)
CHLORIDE SERPL-SCNC: 105 MMOL/L (ref 98–107)
CREAT SERPL-MCNC: 0.92 MG/DL (ref 0.51–0.95)
DEPRECATED HCO3 PLAS-SCNC: 30 MMOL/L (ref 22–29)
EGFRCR SERPLBLD CKD-EPI 2021: 72 ML/MIN/1.73M2
FERRITIN SERPL-MCNC: 155 NG/ML (ref 11–328)
GLUCOSE SERPL-MCNC: 106 MG/DL (ref 70–99)
IRON BINDING CAPACITY (ROCHE): 295 UG/DL (ref 240–430)
IRON SATN MFR SERPL: 18 % (ref 15–46)
IRON SERPL-MCNC: 53 UG/DL (ref 37–145)
POTASSIUM SERPL-SCNC: 4.9 MMOL/L (ref 3.4–5.3)
PROT SERPL-MCNC: 6.4 G/DL (ref 6.4–8.3)
SODIUM SERPL-SCNC: 142 MMOL/L (ref 135–145)

## 2024-01-15 PROCEDURE — 83540 ASSAY OF IRON: CPT

## 2024-01-15 PROCEDURE — 82728 ASSAY OF FERRITIN: CPT

## 2024-01-15 PROCEDURE — 80053 COMPREHEN METABOLIC PANEL: CPT

## 2024-01-15 PROCEDURE — 36415 COLL VENOUS BLD VENIPUNCTURE: CPT

## 2024-01-15 PROCEDURE — 83550 IRON BINDING TEST: CPT

## 2024-01-19 ENCOUNTER — VIRTUAL VISIT (OUTPATIENT)
Dept: SURGERY | Facility: CLINIC | Age: 59
End: 2024-01-19
Payer: COMMERCIAL

## 2024-01-19 DIAGNOSIS — Z71.3 NUTRITIONAL COUNSELING: ICD-10-CM

## 2024-01-19 DIAGNOSIS — Z98.84 S/P GASTRIC BYPASS: Primary | ICD-10-CM

## 2024-01-19 DIAGNOSIS — K91.2 POSTOPERATIVE MALABSORPTION: ICD-10-CM

## 2024-01-19 DIAGNOSIS — E66.01 OBESITY, CLASS III, BMI 40-49.9 (MORBID OBESITY) (H): ICD-10-CM

## 2024-01-19 PROCEDURE — 97803 MED NUTRITION INDIV SUBSEQ: CPT | Mod: 95 | Performed by: DIETITIAN, REGISTERED

## 2024-01-19 NOTE — LETTER
1/19/2024         RE: Halina Ibrahim  [address] [facility] [address]        Dear Colleague,    Thank you for referring your patient, Halina Ibrahim, to the [facility]ATRICS [facility]ARE [place]. Please see a copy of my visit note below.    Halina Ibrahim is a 58 year old who is being evaluated via a billable video visit.    How would you like to obtain your AVS? MyChart  If the video visit is dropped, the invitation should be resent by: Send to e-mail at: [email or web address]  Will anyone else be joining your video visit? No  {If patient encounters technical issues they should call [phone]      Post-op Surgical Weight Loss Diet Evaluation     Assessment:  Pt presents for  19 years  post-op RD visit, s/p RNY GB on 9/14/2004. Today we reviewed current eating habits and level of physical activity, and instructed on the changes that are required for successful bariatric outcomes.    Patient Progress: Had recent surgery in October, which took a few weeks to heal and get back to normal.  Patient reports that she is trying to be conscious in regards to protein intake consuming foods such as yogurt, meat, chicken, tuna, etc...     Initial weight: 269 lbs  Current weight: 272 lbs   Weight change: up 3 lbs (since last visit)    There is no height or weight on file to calculate BMI.    Patient Active Problem List   Diagnosis     Aortic valve disorder     S/P AVR     Restless leg syndrome     Morbid obesity (H)     History of cholecystectomy         Vitamins   Multi Vit with Iron: yes  Calcium Citrate: yes  B12: yes  D3: yes    Diet Recall/Time:   Breakfast: [employer] Oatmeal or Breakfast Bowl ([employer] [place])   Lunch: leftovers from the night before or occasional S[employer] or Piada with Avocado and Chicken, 3 meatballs with sauce, corn salad or Breakfast Bowl of Chicken Nuggets or cheese and crackers or tuna and crackers  Dinner: similar to lunch    Typical Snacks: bagel 1  "time/day or popcorn       Estimated protein intake: 40-50 grams      Fluid Intake  Water: at least 64 oz/day  Caffeine: Pepsi One-working on reducing    Exercise: no set regimen       PES statement:        (NI-5.7.1) Inadequate protein intake related to Gastric bypass causing increased nutrient needs due to malabsorption/ Decreased ability to consume sufficient protein as evidenced by Estimated intake of protein insufficient to meet requirements    Intervention    Discussion    Recommended to consume 15-20gm protein at 3 meals daily, along with protein supplement/\"planned protein containing snack\" of 15-30gm protein, to reach goal of 60-80 gm protein daily.  Educated on post-op vitamin regimen: Multi Vit + iron 2x/day, calcium citrate 400-600 mg 2x/day, 8114-8034 mcg of Sublingual B-12 daily, and 5000 IU Vitamin D3 daily (MVI and calcium can be taken at the same time BID)  Reviewed lean protein sources  Bariatric Plate Method-  including lean/low fat protein at each meal, including a vegetable/fruit, and limiting carbohydrate intake to less than 25% of plate volume.     Instructions  Include 15-20gm protein at each meal, along with protein supplement/\"planned protein containing snack\" of 15-30gm protein, to reach goal of 60-80 gm protein daily.  Increase fluid intake to 64oz daily: choose plain or calorie/carbonation-free beverages.  Incorporate daily structured activity, 30-60 minutes most days of the week  Recommended pt to start taking: Multi Vit + iron 2x/day, calcium citrate 400-600 mg 2x/day, 0513-8954 mcg of Sublingual B-12 daily, and 5000 IU Vitamin D3 daily. (MVI and calcium can be taken at the same time)  Read food labels more consistently: keeping total fat grams <10, total sugar grams <10, fiber >3gm per serving.  Increase vegetable/fruit intake, by having a vegetable or fruit with each meal daily.  Practice plate method: 1/2 plate lean/low fat protein source, vegetable/fruit, <25% of plate complex " carbohydrates.  Separate fluids 30 minutes before/after meal times.  Practice eating off of smaller plates/bowls, chewing to applesauce consistency, taking 20-30 minutes to eat in a calm/relaxed environment without distractions of tv/email/cell phone.    Handouts provided:  None    Assessment/Plan:    Pt to follow up in 1 month with Bariatrician and 2 months with Dietitian      Video-Visit Details    Type of service:  Video Visit    Video Start Time (time video started):  10:53 AM    Video End Time (time video stopped):  11:09 AM    Originating Location (pt. Location): Home      Distant Location (provider location):  Off-site    Mode of Communication:  Video Conference via [facility]    Physician has received verbal consent for a Video Visit from the patient? Yes    Krystle Ballard RD            Again, thank you for allowing me to participate in the care of your patient.        Sincerely,        Krystle Ballard RD

## 2024-01-19 NOTE — PROGRESS NOTES
Halina Ibrahim is a 58 year old who is being evaluated via a billable video visit.    How would you like to obtain your AVS? MyChart  If the video visit is dropped, the invitation should be resent by: Send to e-mail at: [email or web address]  Will anyone else be joining your video visit? No  {If patient encounters technical issues they should call [phone]      Post-op Surgical Weight Loss Diet Evaluation     Assessment:  Pt presents for  19 years  post-op RD visit, s/p RNY GB on 9/14/2004. Today we reviewed current eating habits and level of physical activity, and instructed on the changes that are required for successful bariatric outcomes.    Patient Progress: Had recent surgery in October, which took a few weeks to heal and get back to normal.  Patient reports that she is trying to be conscious in regards to protein intake consuming foods such as yogurt, meat, chicken, tuna, etc...     Initial weight: 269 lbs  Current weight: 272 lbs   Weight change: up 3 lbs (since last visit)    There is no height or weight on file to calculate BMI.    Patient Active Problem List   Diagnosis    Aortic valve disorder    S/P AVR    Restless leg syndrome    Morbid obesity (H)    History of cholecystectomy         Vitamins   Multi Vit with Iron: yes  Calcium Citrate: yes  B12: yes  D3: yes    Diet Recall/Time:   Breakfast: [employer] Oatmeal or Breakfast Bowl ([employer] [place])   Lunch: leftovers from the night before or occasional [employer] or Piada with Avocado and Chicken, 3 meatballs with sauce, corn salad or Breakfast Bowl of Chicken Nuggets or cheese and crackers or tuna and crackers  Dinner: similar to lunch    Typical Snacks: bagel 1 time/day or popcorn       Estimated protein intake: 40-50 grams      Fluid Intake  Water: at least 64 oz/day  Caffeine: Pepsi One-working on reducing    Exercise: no set regimen       PES statement:        (NI-5.7.1) Inadequate protein intake related to Gastric bypass causing  "increased nutrient needs due to malabsorption/ Decreased ability to consume sufficient protein as evidenced by Estimated intake of protein insufficient to meet requirements    Intervention    Discussion    Recommended to consume 15-20gm protein at 3 meals daily, along with protein supplement/\"planned protein containing snack\" of 15-30gm protein, to reach goal of 60-80 gm protein daily.  Educated on post-op vitamin regimen: Multi Vit + iron 2x/day, calcium citrate 400-600 mg 2x/day, 7573-6573 mcg of Sublingual B-12 daily, and 5000 IU Vitamin D3 daily (MVI and calcium can be taken at the same time BID)  Reviewed lean protein sources  Bariatric Plate Method-  including lean/low fat protein at each meal, including a vegetable/fruit, and limiting carbohydrate intake to less than 25% of plate volume.     Instructions  Include 15-20gm protein at each meal, along with protein supplement/\"planned protein containing snack\" of 15-30gm protein, to reach goal of 60-80 gm protein daily.  Increase fluid intake to 64oz daily: choose plain or calorie/carbonation-free beverages.  Incorporate daily structured activity, 30-60 minutes most days of the week  Recommended pt to start taking: Multi Vit + iron 2x/day, calcium citrate 400-600 mg 2x/day, 7926-9625 mcg of Sublingual B-12 daily, and 5000 IU Vitamin D3 daily. (MVI and calcium can be taken at the same time)  Read food labels more consistently: keeping total fat grams <10, total sugar grams <10, fiber >3gm per serving.  Increase vegetable/fruit intake, by having a vegetable or fruit with each meal daily.  Practice plate method: 1/2 plate lean/low fat protein source, vegetable/fruit, <25% of plate complex carbohydrates.  Separate fluids 30 minutes before/after meal times.  Practice eating off of smaller plates/bowls, chewing to applesauce consistency, taking 20-30 minutes to eat in a calm/relaxed environment without distractions of tv/email/cell phone.    Handouts " provided:  None    Assessment/Plan:    Pt to follow up in 1 month with Bariatrician and 2 months with Dietitian      Video-Visit Details    Type of service:  Video Visit    Video Start Time (time video started):  10:53 AM    Video End Time (time video stopped):  11:09 AM    Originating Location (pt. Location): Home      Distant Location (provider location):  Off-site    Mode of Communication:  Video Conference via [facility]    Physician has received verbal consent for a Video Visit from the patient? Yes    Krystle Ballard, RD

## 2024-01-25 NOTE — PROGRESS NOTES
Bariatric Care Clinic Follow Up Visit for Previous Bariatric Surgery   Date of visit: 1/29/2024  Physician: DURAN Glasgow MD, MD  Primary Care is Lu Carrillo  Halina Ibrahim   58 year old  female    Date of Surgery: 9/14/04  Initial Weight: 296#   Today's Weight:   Wt Readings from Last 1 Encounters:   01/29/24 123.4 kg (272 lb)     Body mass index is 43.9 kg/m .       Assessment and Plan   Assessment: Halina is a 58 year old year old female who is 19 years s/p RNY gastric bypass with Dr. Macario. They have had a durable weight loss of 24 lbs since surgery.  Overall compliance with the [facility] Bariatric Surgery Program has been excellent.      Plan:    1. Postoperative malabsorption  Patient is taking all vitamins as directed.  Recent routine postoperative labs were normal. She was reminded to slow down, chew foods thoroughly, and to avoid liquids within 30 minutes of solids. Written information was given. She was congratulated on her success thus far.     2. Morbid obesity (H)  Patient was congratulated on her success thus far. Healthy habits to assist with further weight loss were discussed. She will try do some exercise. She will restart the topamax and we will try starting zepbound. We discussed the patient's co-morbid conditions including CAD and migraine headaches. These likely will improve with healthy habits and weight loss.      Follow up in 3-4 months with myself    Total time spent on the date of this encounter doing: chart review, review of test results, patient visit, physical exam, education, counseling, developing plan of care and documenting = 37 minutes.      Bariatric Surgery Review   Interim History/LifeChanges: patient had laparoscopic assisted gastric access for ERCP on 10/30/23. Choledocholithiasis was found and removed. Her ferritin and LFTs have returned to normal.  She was on saxenda in the past and struggled with nausea whenever she increased the dose. She has also done well on  "topamax.     Patient Concerns:she has months where she is extremely fatigued, she wants to sleep all of the time    GERD no    Medication changes: stopped saxenda and topamax    Vitamin Intake:   Multivitamin   Twice a day, not sure if it contains   Vitamin D  10,000 IU   Calcium  Citrate twice a day   Vit. B-12    Super b complex twice a day     Habits:            Alcohol Intake  none   NSAID Use  none   Caffeine Use  Soda (sugared and diet)   Exercise  None, ADLs only   CPAP Use:  Not discussed   Birth Control  menopause   Tobacco Use     none                                      LABS: Routine post op labs were done in September. Her recent repeat iron, ferritin and cmp were back to normal      LABS:  Lab Results   Component Value Date    WBC 6.1 09/12/2023    HGB 12.8 09/12/2023    HCT 39.1 09/12/2023    MCV 86 09/12/2023     09/12/2023      No components found for: \"QMBNDJAG29VV\" Lab Results   Component Value Date    HGBA1C 5.6 09/06/2019      Lab Results   Component Value Date    CHOL 177 09/12/2023    No components found for: \"PTH\"      No components found for: \"FERRITIN\"   Lab Results   Component Value Date    HDL 63 09/12/2023      No components found for: \"XQWWLJEJ77\" No components found for: \"79083\"   No components found for: \"LDLCALC\" No results found for: \"TSH\" No components found for: \"FOLATE\"   Lab Results   Component Value Date    TRIG 116 09/12/2023    Lab Results   Component Value Date    ALT 27 01/15/2024    AST 33 01/15/2024     (H) 09/12/2023    ALKPHOS 102 01/15/2024    No results found for: \"TESTOSTERONE\"     No results found for: \"CHOLHDL\" No components found for: \"7597\"   @resusfast(vitamin a: 1)@             Patient Profile   Social History     Social History Narrative    Not on file        Past Medical History   Past Medical History:   Diagnosis Date    Aortic regurgitation     Aortic valve replaced     Degenerative disc disease     Dyslipidemia     Hashimoto's disease     " "History of Sabrina-en-Y gastric bypass 9/14/2004    Dr. Macario Highest weight 296#    Hypertension     Low back pain     Metabolic syndrome     Mitral valve replaced     Morbid obesity (H)     Morbid obesity with BMI of 40.0-44.9, adult (H)     Other and unspecified postsurgical nonabsorption     S/P gastric bypass     S/P MVR (mitral valve replacement)     Stroke (H)     Stroke (H)     from mass near mitral valve     Patient Active Problem List   Diagnosis    Aortic valve disorder    S/P AVR    Restless leg syndrome    Morbid obesity (H)    History of cholecystectomy       Past Surgical History  She has a past surgical history that includes s/p mitral valve replacement[; s/p gastric bypass[; Redo sternotomy replace valve aortic (3/14/2013); tubal ligation; other surgical history; Revision Sabrina-En-Y; aortic valve replacement; and Replace valve mitral.     Examination   Ht 1.676 m (5' 6\")   Wt 123.4 kg (272 lb)   BMI 43.90 kg/m      Wt Readings from Last 4 Encounters:   01/29/24 123.4 kg (272 lb)   10/10/23 122 kg (269 lb)   08/01/23 119.5 kg (263 lb 6.4 oz)   06/26/23 122 kg (269 lb)      GENERAL: alert and no distress  EYES: Eyes grossly normal to inspection.  No discharge or erythema, or obvious scleral/conjunctival abnormalities.  RESP: No audible wheeze, cough, or visible cyanosis.    SKIN: Visible skin clear. No significant rash, abnormal pigmentation or lesions.  NEURO: Cranial nerves grossly intact.  Mentation and speech appropriate for age.  PSYCH: Appropriate affect, tone, and pace of words          Counseling:   We reviewed the important post op bariatric recommendations:  -eating 3 meals daily  -eating protein first, getting >60gm protein daily  -eating slowly, chewing food well  -avoiding/limiting calorie containing beverages  -drinking water 15-30 minutes before or after meals  -choosing wheat, not white with breads, crackers, pastas, madelyn, bagels, tortillas, rice  -limiting restaurant or cafeteria eating " to twice a week or less    We discussed the importance of restorative sleep and stress management in maintaining a healthy weight.  We discussed the National Weight Control Registry healthy weight maintenance strategies and ways to optimize metabolism.  We discussed the importance of physical activity including cardiovascular and strength training in maintaining a healthier weight.  We discussed the importance of life-long vitamin supplementation and life-long  follow-up.    Halina was reminded that, to avoid marginal ulcers she should avoid tobacco at all, alcohol in excess, caffeine in excess, and NSAIDS (unless indicated for cardioprotection or othewise and opposed by a PPI).    DURAN Glasgow MD  [facility] Bariatric clinic  1/25/2024  12:40 PM            No images are attached to the encounter.

## 2024-01-29 ENCOUNTER — TELEPHONE (OUTPATIENT)
Dept: SURGERY | Facility: CLINIC | Age: 59
End: 2024-01-29

## 2024-01-29 ENCOUNTER — VIRTUAL VISIT (OUTPATIENT)
Dept: SURGERY | Facility: CLINIC | Age: 59
End: 2024-01-29
Payer: COMMERCIAL

## 2024-01-29 VITALS — BODY MASS INDEX: 43.71 KG/M2 | HEIGHT: 66 IN | WEIGHT: 272 LBS

## 2024-01-29 DIAGNOSIS — E66.01 MORBID OBESITY (H): ICD-10-CM

## 2024-01-29 DIAGNOSIS — K91.2 POSTOPERATIVE MALABSORPTION: Primary | ICD-10-CM

## 2024-01-29 DIAGNOSIS — Z86.69 HISTORY OF MIGRAINE HEADACHES: ICD-10-CM

## 2024-01-29 PROCEDURE — 99214 OFFICE O/P EST MOD 30 MIN: CPT | Mod: 95 | Performed by: FAMILY MEDICINE

## 2024-01-29 RX ORDER — TOPIRAMATE 50 MG/1
TABLET, FILM COATED ORAL
Qty: 90 TABLET | Refills: 1 | Status: SHIPPED | OUTPATIENT
Start: 2024-01-29 | End: 2024-09-09

## 2024-01-29 ASSESSMENT — PAIN SCALES - GENERAL: PAINLEVEL: NO PAIN (0)

## 2024-01-29 NOTE — PATIENT INSTRUCTIONS
Yoga-https://www.Plickers.com/user/yogawithadriene    Body strength activities, dance, high intensity interval training- https://www.Plickers.com/user/popsugartvfit    Strength training- https://www.Plickers.com/user/KozakSportsPerform    Walking- https://www.Plickers.com/user/walkathomemedia    Sit and Be Fit- https://www.Plickers.Trudev/channel/UCLgvL3aGzMByecNYtMcyK_g    Low impact cardio- Ronit Fung- https://www.Plickers.com/channel/VDcgQViPfwsDaopZ2ewcdkeN    Cardio Yeecnia Paniagua- https://www.Plickers.com/channel/ZKXtBztl9RTnXYVTH8kTaOMt    30 Min low impact cardio- https://www.Investment Undergroundube.com/watch?v=gC_L9qAHVJ8    Body Project- https://www.Plickers.Trudev/channel/OMEsz9P90-PlNwQGoYbhJ4QG

## 2024-01-29 NOTE — NURSING NOTE
Is the patient currently in the state of [place]? YES    Visit mode:VIDEO    If the visit is dropped, the patient can be reconnected by: VIDEO VISIT: Text to cell phone:   Telephone Information:   Mobile [phone]       Will anyone else be joining the visit? NO  (If patient encounters technical issues they should call [phone]    How would you like to obtain your AVS? MyChart    Are changes needed to the allergy or medication list? No    Reason for visit: RECHECK    Pt declines to complete qnrs.    Geoff SOTO

## 2024-01-29 NOTE — LETTER
1/29/2024         RE: Halina Ibrahim  [address] [place] [address]        Dear Colleague,    Thank you for referring your patient, Halina Ibrahim, to the [facility] [place]. Please see a copy of my visit note below.    Bariatric Care Clinic Follow Up Visit for Previous Bariatric Surgery   Date of visit: 1/29/2024  Physician: DURAN Glasgow MD, MD  Primary Care is Lu Carirllo  Halina Ibrahim   58 year old  female    Date of Surgery: 9/14/04  Initial Weight: 296#   Today's Weight:   Wt Readings from Last 1 Encounters:   01/29/24 123.4 kg (272 lb)     Body mass index is 43.9 kg/m .       Assessment and Plan   Assessment: Halina is a 58 year old year old female who is 19 years s/p RNY gastric bypass with Dr. Macario. They have had a durable weight loss of 24 lbs since surgery.  Overall compliance with the [facility] Bariatric Surgery Program has been excellent.      Plan:    1. Postoperative malabsorption  Patient is taking all vitamins as directed.  Recent routine postoperative labs were normal. She was reminded to slow down, chew foods thoroughly, and to avoid liquids within 30 minutes of solids. Written information was given. She was congratulated on her success thus far.     2. Morbid obesity (H)  Patient was congratulated on her success thus far. Healthy habits to assist with further weight loss were discussed. She will try do some exercise. She will restart the topamax and we will try starting zepbound. We discussed the patient's co-morbid conditions including CAD and migraine headaches. These likely will improve with healthy habits and weight loss.      Follow up in 3-4 months with myself    Total time spent on the date of this encounter doing: chart review, review of test results, patient visit, physical exam, education, counseling, developing plan of care and documenting = 37 minutes.      Bariatric Surgery Review   Interim History/LifeChanges:  "patient had laparoscopic assisted gastric access for ERCP on 10/30/23. Choledocholithiasis was found and removed. Her ferritin and LFTs have returned to normal.  She was on saxenda in the past and struggled with nausea whenever she increased the dose. She has also done well on topamax.     Patient Concerns:she has months where she is extremely fatigued, she wants to sleep all of the time    GERD no    Medication changes: stopped saxenda and topamax    Vitamin Intake:   Multivitamin   Twice a day, not sure if it contains   Vitamin D  10,000 IU   Calcium  Citrate twice a day   Vit. B-12    Super b complex twice a day     Habits:            Alcohol Intake  none   NSAID Use  none   Caffeine Use  Soda (sugared and diet)   Exercise  None, ADLs only   CPAP Use:  Not discussed   Birth Control  menopause   Tobacco Use     none                                      LABS: Routine post op labs were done in September. Her recent repeat iron, ferritin and cmp were back to normal      LABS:  Lab Results   Component Value Date    WBC 6.1 09/12/2023    HGB 12.8 09/12/2023    HCT 39.1 09/12/2023    MCV 86 09/12/2023     09/12/2023      No components found for: \"TEFHVESY16ZG\" Lab Results   Component Value Date    HGBA1C 5.6 09/06/2019      Lab Results   Component Value Date    CHOL 177 09/12/2023    No components found for: \"PTH\"      No components found for: \"FERRITIN\"   Lab Results   Component Value Date    HDL 63 09/12/2023      No components found for: \"[id number]\" No components found for: \"35858\"   No components found for: \"LDLCALC\" No results found for: \"TSH\" No components found for: \"FOLATE\"   Lab Results   Component Value Date    TRIG 116 09/12/2023    Lab Results   Component Value Date    ALT 27 01/15/2024    AST 33 01/15/2024     (H) 09/12/2023    ALKPHOS 102 01/15/2024    No results found for: \"TESTOSTERONE\"     No results found for: \"CHOLHDL\" No components found for: \"7597\"   @resusfast(vitamin a: 1)@         " "    Patient Profile   Social History     Social History Narrative     Not on file        Past Medical History   Past Medical History:   Diagnosis Date     Aortic regurgitation      Aortic valve replaced      Degenerative disc disease      Dyslipidemia      Hashimoto's disease      History of Sabrina-en-Y gastric bypass 9/14/2004    Dr. Macario Highest weight 296#     Hypertension      Low back pain      Metabolic syndrome      Mitral valve replaced      Morbid obesity (H)      Morbid obesity with BMI of 40.0-44.9, adult (H)      Other and unspecified postsurgical nonabsorption      S/P gastric bypass      S/P MVR (mitral valve replacement)      Stroke (H)      Stroke (H)     from mass near mitral valve     Patient Active Problem List   Diagnosis     Aortic valve disorder     S/P AVR     Restless leg syndrome     Morbid obesity (H)     History of cholecystectomy       Past Surgical History  She has a past surgical history that includes s/p mitral valve replacement[; s/p gastric bypass[; Redo sternotomy replace valve aortic (3/14/2013); tubal ligation; other surgical history; Revision Sabrina-En-Y; aortic valve replacement; and Replace valve mitral.     Examination   Ht 1.676 m (5' 6\")   Wt 123.4 kg (272 lb)   BMI 43.90 kg/m      Wt Readings from Last 4 Encounters:   01/29/24 123.4 kg (272 lb)   10/10/23 122 kg (269 lb)   08/01/23 119.5 kg (263 lb 6.4 oz)   06/26/23 122 kg (269 lb)      GENERAL: alert and no distress  EYES: Eyes grossly normal to inspection.  No discharge or erythema, or obvious scleral/conjunctival abnormalities.  RESP: No audible wheeze, cough, or visible cyanosis.    SKIN: Visible skin clear. No significant rash, abnormal pigmentation or lesions.  NEURO: Cranial nerves grossly intact.  Mentation and speech appropriate for age.  PSYCH: Appropriate affect, tone, and pace of words          Counseling:   We reviewed the important post op bariatric recommendations:  -eating 3 meals daily  -eating protein " first, getting >60gm protein daily  -eating slowly, chewing food well  -avoiding/limiting calorie containing beverages  -drinking water 15-30 minutes before or after meals  -choosing wheat, not white with breads, crackers, pastas, madelyn, bagels, tortillas, rice  -limiting restaurant or cafeteria eating to twice a week or less    We discussed the importance of restorative sleep and stress management in maintaining a healthy weight.  We discussed the National Weight Control Registry healthy weight maintenance strategies and ways to optimize metabolism.  We discussed the importance of physical activity including cardiovascular and strength training in maintaining a healthier weight.  We discussed the importance of life-long vitamin supplementation and life-long  follow-up.    Halina was reminded that, to avoid marginal ulcers she should avoid tobacco at all, alcohol in excess, caffeine in excess, and NSAIDS (unless indicated for cardioprotection or othewise and opposed by a PPI).    DURAN Glasgow MD  [facility] Bariatric clinic  1/25/2024  12:40 PM            No images are attached to the encounter.      Virtual Visit Details    Type of service:  Video Visit     Originating Location (pt. Location): Home    Distant Location (provider location):  Off-site  Platform used for Video Visit: Astrid  Video start time: 10:08 pm  Video end time: 10:29 am      Again, thank you for allowing me to participate in the care of your patient.        Sincerely,        DURAN Glasgow MD

## 2024-01-29 NOTE — TELEPHONE ENCOUNTER
EPA initiated.     Vivian Steiner RN, CBN  [facility]  P [phone]  F [phone]

## 2024-01-29 NOTE — PROGRESS NOTES
Virtual Visit Details    Type of service:  Video Visit     Originating Location (pt. Location): Home    Distant Location (provider location):  Off-site  Platform used for Video Visit: [employer]  Video start time: 10:08 pm  Video end time: 10:29 am

## 2024-01-31 ENCOUNTER — OFFICE VISIT (OUTPATIENT)
Dept: CARDIOLOGY | Facility: CLINIC | Age: 59
End: 2024-01-31
Payer: COMMERCIAL

## 2024-01-31 VITALS
HEART RATE: 64 BPM | DIASTOLIC BLOOD PRESSURE: 76 MMHG | SYSTOLIC BLOOD PRESSURE: 134 MMHG | WEIGHT: 268 LBS | BODY MASS INDEX: 43.26 KG/M2 | RESPIRATION RATE: 16 BRPM

## 2024-01-31 DIAGNOSIS — Z95.2 S/P MVR (MITRAL VALVE REPLACEMENT): Primary | ICD-10-CM

## 2024-01-31 DIAGNOSIS — Z95.2 S/P AVR (AORTIC VALVE REPLACEMENT): ICD-10-CM

## 2024-01-31 PROCEDURE — 99204 OFFICE O/P NEW MOD 45 MIN: CPT | Performed by: INTERNAL MEDICINE

## 2024-01-31 NOTE — PROGRESS NOTES
HEART CARE ENCOUNTER CONSULTATON KARUNA      [facility]  [phone]      Assessment/Recommendations   Assessment:  1.  Valvular heart disease: Status post left atrial myxoma resection with subsequent mechanical mitral valve replacement with a 27 mm bileaflet Saint Polo mechanical valve.  In 2013 she had a mechanical aortic valve replacement with 21 mm bileaflet [place] Polo valve due to residual severe aortic insufficiency post previous surgery.  2.  Anticoagulation: Maintained on Coumadin for anticoagulation goal INR of 2.5-3.5  3.  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  Morbid obesity  6.  MOLLY on CPAP  7.  Hashimoto's thyroiditis  8.  Hypertension: Well-controlled      Plan:  1.  Echocardiogram to reevaluate valve disease  2.  Repeat echocardiogram in 1 to 2 years with follow-up at that time depending on above results  3.  Continue on losartan  4.  Continue on Coumadin for anticoagulation       History of Present Illness/Subjective    HPI: Halina Ibrahim is a 58 year old female with history of valvular heart disease status post mechanical MVR and mechanical AVR in 2010 2013 respectively, embolic CVA in 2010, morbid obesity, Hashimoto's thyroiditis, MOLLY on CPAP, hypertension who I am seeing today to establish care.  In 2010 she suffered embolic CVA and has residual right visual field cut.  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  During the surgery they were able to preserve her valve and she underwent mechanical mitral valve replacement with a 27 mm bileaflet Saint Polo mechanical valve.  In 2013 she had a mechanical aortic valve replacement with 21 mm bileaflet [place] Polo valve due to residual severe aortic insufficiency post previous surgery.      Previously followed by Dr. Jamison at the [place] and last seen in 2018 and  subsequently lost to follow-up.  Had been following up on a every 2-year basis.  Last echocardiogram 2018 suggested elevated prosthetic gradients and elevated PA pressure with mild RV dilation and mild RV systolic dysfunction.  She reports compliance with her CPAP.  She denies any falls orthopnea or edema.  She admits to being very sedentary.  She does garden this summer and does some light housework.  Denies any symptoms with these activities.    Recent Echocardiogram Results: 7/6/2018  Interpretation Summary  s/p 19 mm St. Polo bileaflet prosthesis in aortic position (2013) and 27 mm  St. Polo bileaflet mechanical prosthesis in mitral position (2010)     Global and regional left ventricular function is normal with an EF of 55-60%.  Mild right ventricular dilation is present. Global right ventricular function  is mildly reduced.  Prosthetic gradients are increased.  Increased PA pressure.     This study was compared with the study from 08/08/2016. The gradients across  the mechanical mitral and aortic valve prostheses are now higher without  change in heart rate.       Echocardiogram 8/8/2016  Interpretation Summary  19 mm St Polo aortic prosthesis 2013 and 27 mm St Polo mitral prosthesis  2010. Mitral disc motion is normal, aortic disc motion was not visualized.  Both valves have normal hemodynamics (see below)     Normal LV size with mild eccentric LVH and normal systolic function. Visually  estiamted LVEF 60%    Left Ventricle  Normal LV size with mild eccentric LVH and normal systolic function. Visually  estiamted LVEF 60%.     Right Ventricle  Right ventricular function, chamber size, wall motion, and thickness are  normal.  Atria  The right atria appears normal. Mild left atrial enlargement is present.     Mitral Valve  The mean and peak gradients are 5 and 10 mm Hg with pressure half time 75  msec.     Aortic Valve  Trace aortic insufficiency is present. The peak and mean gradients are 36 and  26 mmHg, the  DI .45, and EOA 1.6 cm2. Mild pt-prosthesis mismatch is present  with .73 cm/m2.     Tricuspid Valve  The tricuspid valve is normal. Mild tricuspid insufficiency is present.     Pulmonic Valve  The pulmonic valve is normal.     Vessels  The aorta root is normal. The inferior vena cava is normal. The pulmonary  artery cannot be assessed.  Pericardium  No pericardial effusion is present.    Echocardiogram 8/5/2014  Interpretation Summary  Global and regional left ventricular function is normal with an EF of 55-60%.   Global right ventricular function is normal. A mechanical mitral valve is   present. Doppler interrogation of the mitral valve is normal. A mechanical   aortic valve is present. Doppler interrogation of the aortic valve is    normal. Right ventricular systolic pressure is 29mmHg above the right atrial   pressure. The inferior vena cava  is normal. No pericardial effusion is   present.  PatientHeight: 66 in  PatientWeight: 232 lbs  SystolicPressure: 114 mmHg  DiastolicPressure: 64 mmHg  HeartRate: 59 bpm  BSA 2.1 m^2    Echocardiogram 7/15/2013  Interpretation Summary  Global and regional left ventricular function is normal with an EF of 55-60%.  Left ventricular size is normal. Abnormal non-specific septal motion is  present The right ventricle is normal size. Global right ventricular function  is mildly reduced. A mechanical aortic valve is present. The mean gradient is  22 mmHg. Doppler interrogation of the aortic valve is  normal. A bileaflet  mitral valve is present. Doppler interrogation of the mitral valve is normal.  The mean gradient across the mitral valve is 6 mmHg. The inferior vena cava  was normal in size with preserved respiratory variability. No pericardial  effusion is present.  PatientHeight: 66 in  PatientWeight: 230 lbs  SystolicPressure: 148 mmHg  DiastolicPressure: 89 mmHg  BSA 2.1 m^2      Physical Examination  Review of Systems   Vitals: /76 (BP Location: Right arm, Patient  Position: Sitting, Cuff Size: Adult Large)   Pulse 64   Resp 16   Wt 121.6 kg (268 lb)   BMI 43.26 kg/m    BMI= Body mass index is 43.26 kg/m .  Wt Readings from Last 3 Encounters:   01/31/24 121.6 kg (268 lb)   01/29/24 123.4 kg (272 lb)   10/10/23 122 kg (269 lb)       General Appearance:   no distress, normal body habitus   ENT/Mouth: membranes moist, no oral lesions or bleeding gums.      EYES:  no scleral icterus, normal conjunctivae   Neck: no carotid bruits or thyromegaly   Chest/Lungs:   lungs are clear to auscultation   Cardiovascular:   Regular. Normal first and second heart sounds with systolic murmur and click, no edema bilaterally        Extremities: no cyanosis or clubbing   Skin: no xanthelasma, warm.    Neurologic: normal  bilateral, no tremors     Psychiatric: alert and oriented x3, calm        Please refer above for cardiac ROS details.        Medical History  Surgical History Family History Social History   Past Medical History:   Diagnosis Date    Aortic regurgitation     Aortic valve replaced     Degenerative disc disease     Dyslipidemia     Hashimoto's disease     History of Sabrina-en-Y gastric bypass 9/14/2004    Dr. Macario Highest weight 296#    Hypertension     Low back pain     Metabolic syndrome     Mitral valve replaced     Morbid obesity (H)     Morbid obesity with BMI of 40.0-44.9, adult (H)     Other and unspecified postsurgical nonabsorption     S/P gastric bypass     S/P MVR (mitral valve replacement)     Stroke (H)     Stroke (H)     from mass near mitral valve     Past Surgical History:   Procedure Laterality Date    AORTIC VALVE REPLACEMENT      OTHER SURGICAL HISTORY      uterine ablation    REDO STERNOTOMY REPLACE VALVE AORTIC  3/14/2013    Procedure: REDO STERNOTOMY REPLACE VALVE AORTIC;  Redo-Median  Sternotomy, Aortic root enlargement . aortic Valve  Replacement  on pump oxygenator;  Surgeon: Buck Paula MD;  Location: [facility] OR    REPLACE VALVE MITRAL       REVISION NANY-EN-Y      s/p gastric bypass[      s/p mitral valve replacement[      TUBAL LIGATION       Family History   Problem Relation Age of Onset    Cancer Mother     Heart Disease Father     Heart Disease Sister     Obesity Sister     Cancer Maternal Grandmother         Social History     Socioeconomic History    Marital status: Single     Spouse name: Not on file    Number of children: Not on file    Years of education: Not on file    Highest education level: Not on file   Occupational History    Not on file   Tobacco Use    Smoking status: Former     Types: Cigarettes     Quit date: 3/17/2010     Years since quittin.8    Smokeless tobacco: Never   Vaping Use    Vaping Use: Never used   Substance and Sexual Activity    Alcohol use: No    Drug use: No    Sexual activity: Never     Birth control/protection: Surgical   Other Topics Concern    Parent/sibling w/ CABG, MI or angioplasty before 65F 55M? Not Asked   Social History Narrative    Not on file     Social Determinants of Health     Financial Resource Strain: Not on file   Food Insecurity: Not on file   Transportation Needs: Not on file   Physical Activity: Not on file   Stress: Not on file   Social Connections: Not on file   Interpersonal Safety: Not on file   Housing Stability: Not on file           Medications  Allergies   Current Outpatient Medications   Medication Sig Dispense Refill    B Complex-C (SUPER B COMPLEX) TABS Take 1 BID      BIOTIN PO Take 10,000 mcg by mouth daily      Calcium 600 MG tablet Take 1 tablet by mouth 2 times daily.      citalopram (CELEXA) 10 MG tablet Take 10 mg by mouth daily      clindamycin (CLEOCIN) 300 MG capsule       gabapentin (NEURONTIN) 100 MG capsule 1 capsule twice daily as needed for restless legs along with 300 mg capsules at bedtime.      gabapentin (NEURONTIN) 300 MG capsule Take 4 capsules by mouth daily.      insulin pen needle (31G X 6 MM) 31G X 6 MM miscellaneous Use 1 pen needle daily or as  "directed. 100 each 11    levothyroxine (SYNTHROID/LEVOTHROID) 175 MCG tablet       losartan (COZAAR) 100 MG tablet Take 100 mg by mouth      morphine (MS CONTIN) 15 MG CR tablet Take 15 mg by mouth every 12 hours      Multiple Vitamin (DAILY MULTIVITAMIN PO) Take 1 twice a day      tirzepatide-Weight Management (ZEPBOUND) 2.5 MG/0.5ML prefilled pen Inject 0.5 mLs (2.5 mg) Subcutaneous every 7 days 2 mL 3    topiramate (TOPAMAX) 50 MG tablet 1/2 tablet in the evening. You may increase to 1 full tablet after 1 week. 90 tablet 1    vitamin D3 (CHOLECALCIFEROL) 125 MCG (5000 UT) tablet Take 5,000 Units by mouth      warfarin (COUMADIN) 2.5 MG tablet Take 2 tablets by mouth daily. 90 tablet 4    CHELATED IRON PO 25 mg 2 tablets twice a day (Patient not taking: Reported on 1/29/2024)         Allergies   Allergen Reactions    Tramadol Nausea     No relief and \"funny feeling\" after, not really effective      Trazodone Other (See Comments)     Made restless legs worse    Pcn [Penicillins] Hives          Lab Results    Chemistry/lipid CBC Cardiac Enzymes/BNP/TSH/INR   Recent Labs   Lab Test 09/12/23  0720   CHOL 177   HDL 63   LDL 91   TRIG 116     Recent Labs   Lab Test 09/12/23  0720 08/30/22  0734   LDL 91 104*     Recent Labs   Lab Test 01/15/24  1522      POTASSIUM 4.9   CHLORIDE 105   CO2 30*   *   BUN 10.8   CR 0.92   GFRESTIMATED 72   TAMIKA 9.5     Recent Labs   Lab Test 01/15/24  1522 09/20/23  1546 09/12/23  0720   CR 0.92 0.89 0.91     Recent Labs   Lab Test 09/12/23  0720 08/30/22  0734   A1C 5.5 5.5          Recent Labs   Lab Test 09/12/23  0720   WBC 6.1   HGB 12.8   HCT 39.1   MCV 86        Recent Labs   Lab Test 09/12/23  0720 08/30/22  0734   HGB 12.8 12.7    No results for input(s): \"TROPONINI\" in the last 64385 hours.  No results for input(s): \"BNP\", \"NTBNPI\", \"NTBNP\" in the last 02212 hours.  No results for input(s): \"TSH\" in the last 21025 hours.  Recent Labs   Lab Test 09/20/23  1546 "   INR 3.14*        Anuja Cagle MD

## 2024-01-31 NOTE — LETTER
1/31/2024    ROSLYN GOTTI  [address]  [place] [address]    RE: Halina Ibrahim       Dear Colleague,     I had the pleasure of seeing Halina Ibrahim in the [facility].    HEART CARE ENCOUNTER CONSULTATON NOTE      ANIKA [facility]  [phone]      Assessment/Recommendations   Assessment:  1.  Valvular heart disease: Status post left atrial myxoma resection with subsequent mechanical mitral valve replacement with a 27 mm bileaflet Saint Polo mechanical valve.  In 2013 she had a mechanical aortic valve replacement with 21 mm bileaflet [place]o valve due to residual severe aortic insufficiency post previous surgery.  2.  Anticoagulation: Maintained on Coumadin for anticoagulation goal INR of 2.5-3.5  3.  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  Morbid obesity  6.  MOLLY on CPAP  7.  Hashimoto's thyroiditis  8.  Hypertension: Well-controlled      Plan:  1.  Echocardiogram to reevaluate valve disease  2.  Repeat echocardiogram in 1 to 2 years with follow-up at that time depending on above results  3.  Continue on losartan  4.  Continue on Coumadin for anticoagulation       History of Present Illness/Subjective    HPI: Halina Ibrahim is a 58 year old female with history of valvular heart disease status post mechanical MVR and mechanical AVR in 2010 2013 respectively, embolic CVA in 2010, morbid obesity, Hashimoto's thyroiditis, MOLLY on CPAP, hypertension who I am seeing today to establish care.  In 2010 she suffered embolic CVA and has residual right visual field cut.  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  During the surgery they were able to preserve her valve and she underwent mechanical mitral valve replacement with a 27 mm bileaflet Saint Polo mechanical valve.  In 2013 she had a mechanical aortic valve replacement  with 21 mm bileaflet Saint [place]o valve due to residual severe aortic insufficiency post previous surgery.      Previously followed by Dr. Jamison at the [place] and last seen in 2018 and subsequently lost to follow-up.  Had been following up on a every 2-year basis.  Last echocardiogram 2018 suggested elevated prosthetic gradients and elevated PA pressure with mild RV dilation and mild RV systolic dysfunction.  She reports compliance with her CPAP.  She denies any falls orthopnea or edema.  She admits to being very sedentary.  She does garden this summer and does some light housework.  Denies any symptoms with these activities.    Recent Echocardiogram Results: 7/6/2018  Interpretation Summary  s/p 19 mm St. Polo bileaflet prosthesis in aortic position (2013) and 27 mm  St. Polo bileaflet mechanical prosthesis in mitral position (2010)     Global and regional left ventricular function is normal with an EF of 55-60%.  Mild right ventricular dilation is present. Global right ventricular function  is mildly reduced.  Prosthetic gradients are increased.  Increased PA pressure.     This study was compared with the study from 08/08/2016. The gradients across  the mechanical mitral and aortic valve prostheses are now higher without  change in heart rate.       Echocardiogram 8/8/2016  Interpretation Summary  19 mm St Polo aortic prosthesis 2013 and 27 mm St Polo mitral prosthesis  2010. Mitral disc motion is normal, aortic disc motion was not visualized.  Both valves have normal hemodynamics (see below)     Normal LV size with mild eccentric LVH and normal systolic function. Visually  estiamted LVEF 60%    Left Ventricle  Normal LV size with mild eccentric LVH and normal systolic function. Visually  estiamted LVEF 60%.     Right Ventricle  Right ventricular function, chamber size, wall motion, and thickness are  normal.  Atria  The right atria appears normal. Mild left atrial enlargement is present.     Mitral Valve  The  mean and peak gradients are 5 and 10 mm Hg with pressure half time 75  msec.     Aortic Valve  Trace aortic insufficiency is present. The peak and mean gradients are 36 and  26 mmHg, the DI .45, and EOA 1.6 cm2. Mild pt-prosthesis mismatch is present  with .73 cm/m2.     Tricuspid Valve  The tricuspid valve is normal. Mild tricuspid insufficiency is present.     Pulmonic Valve  The pulmonic valve is normal.     Vessels  The aorta root is normal. The inferior vena cava is normal. The pulmonary  artery cannot be assessed.  Pericardium  No pericardial effusion is present.    Echocardiogram 8/5/2014  Interpretation Summary  Global and regional left ventricular function is normal with an EF of 55-60%.   Global right ventricular function is normal. A mechanical mitral valve is   present. Doppler interrogation of the mitral valve is normal. A mechanical   aortic valve is present. Doppler interrogation of the aortic valve is    normal. Right ventricular systolic pressure is 29mmHg above the right atrial   pressure. The inferior vena cava  is normal. No pericardial effusion is   present.  PatientHeight: 66 in  PatientWeight: 232 lbs  SystolicPressure: 114 mmHg  DiastolicPressure: 64 mmHg  HeartRate: 59 bpm  BSA 2.1 m^2    Echocardiogram 7/15/2013  Interpretation Summary  Global and regional left ventricular function is normal with an EF of 55-60%.  Left ventricular size is normal. Abnormal non-specific septal motion is  present The right ventricle is normal size. Global right ventricular function  is mildly reduced. A mechanical aortic valve is present. The mean gradient is  22 mmHg. Doppler interrogation of the aortic valve is  normal. A bileaflet  mitral valve is present. Doppler interrogation of the mitral valve is normal.  The mean gradient across the mitral valve is 6 mmHg. The inferior vena cava  was normal in size with preserved respiratory variability. No pericardial  effusion is present.  PatientHeight: 66  in  PatientWeight: 230 lbs  SystolicPressure: 148 mmHg  DiastolicPressure: 89 mmHg  BSA 2.1 m^2      Physical Examination  Review of Systems   Vitals: /76 (BP Location: Right arm, Patient Position: Sitting, Cuff Size: Adult Large)   Pulse 64   Resp 16   Wt 121.6 kg (268 lb)   BMI 43.26 kg/m    BMI= Body mass index is 43.26 kg/m .  Wt Readings from Last 3 Encounters:   01/31/24 121.6 kg (268 lb)   01/29/24 123.4 kg (272 lb)   10/10/23 122 kg (269 lb)       General Appearance:   no distress, normal body habitus   ENT/Mouth: membranes moist, no oral lesions or bleeding gums.      EYES:  no scleral icterus, normal conjunctivae   Neck: no carotid bruits or thyromegaly   Chest/Lungs:   lungs are clear to auscultation   Cardiovascular:   Regular. Normal first and second heart sounds with systolic murmur and click, no edema bilaterally        Extremities: no cyanosis or clubbing   Skin: no xanthelasma, warm.    Neurologic: normal  bilateral, no tremors     Psychiatric: alert and oriented x3, calm        Please refer above for cardiac ROS details.        Medical History  Surgical History Family History Social History   Past Medical History:   Diagnosis Date    Aortic regurgitation     Aortic valve replaced     Degenerative disc disease     Dyslipidemia     Hashimoto's disease     History of Sabrina-en-Y gastric bypass 9/14/2004    Dr. Macario Highest weight 296#    Hypertension     Low back pain     Metabolic syndrome     Mitral valve replaced     Morbid obesity (H)     Morbid obesity with BMI of 40.0-44.9, adult (H)     Other and unspecified postsurgical nonabsorption     S/P gastric bypass     S/P MVR (mitral valve replacement)     Stroke (H)     Stroke (H)     from mass near mitral valve     Past Surgical History:   Procedure Laterality Date    AORTIC VALVE REPLACEMENT      OTHER SURGICAL HISTORY      uterine ablation    REDO STERNOTOMY REPLACE VALVE AORTIC  3/14/2013    Procedure: REDO STERNOTOMY REPLACE VALVE  AORTIC;  Redo-Median  Sternotomy, Aortic root enlargement . aortic Valve  Replacement  on pump oxygenator;  Surgeon: Buck Paula MD;  Location: [facility] OR    REPLACE VALVE MITRAL      REVISION NANY-EN-Y      s/p gastric bypass[      s/p mitral valve replacement[      TUBAL LIGATION       Family History   Problem Relation Age of Onset    Cancer Mother     Heart Disease Father     Heart Disease Sister     Obesity Sister     Cancer Maternal Grandmother         Social History     Socioeconomic History    Marital status: Single     Spouse name: Not on file    Number of children: Not on file    Years of education: Not on file    Highest education level: Not on file   Occupational History    Not on file   Tobacco Use    Smoking status: Former     Types: Cigarettes     Quit date: 3/17/2010     Years since quittin.8    Smokeless tobacco: Never   Vaping Use    Vaping Use: Never used   Substance and Sexual Activity    Alcohol use: No    Drug use: No    Sexual activity: Never     Birth control/protection: Surgical   Other Topics Concern    Parent/sibling w/ CABG, MI or angioplasty before 65F 55M? Not Asked   Social History Narrative    Not on file     Social Determinants of Health     Financial Resource Strain: Not on file   Food Insecurity: Not on file   Transportation Needs: Not on file   Physical Activity: Not on file   Stress: Not on file   Social Connections: Not on file   Interpersonal Safety: Not on file   Housing Stability: Not on file           Medications  Allergies   Current Outpatient Medications   Medication Sig Dispense Refill    B Complex-C (SUPER B COMPLEX) TABS Take 1 BID      BIOTIN PO Take 10,000 mcg by mouth daily      Calcium 600 MG tablet Take 1 tablet by mouth 2 times daily.      citalopram (CELEXA) 10 MG tablet Take 10 mg by mouth daily      clindamycin (CLEOCIN) 300 MG capsule       gabapentin (NEURONTIN) 100 MG capsule 1 capsule twice daily as needed for restless legs along with 300 mg  "capsules at bedtime.      gabapentin (NEURONTIN) 300 MG capsule Take 4 capsules by mouth daily.      insulin pen needle (31G X 6 MM) 31G X 6 MM miscellaneous Use 1 pen needle daily or as directed. 100 each 11    levothyroxine (SYNTHROID/LEVOTHROID) 175 MCG tablet       losartan (COZAAR) 100 MG tablet Take 100 mg by mouth      morphine (MS CONTIN) 15 MG CR tablet Take 15 mg by mouth every 12 hours      Multiple Vitamin (DAILY MULTIVITAMIN PO) Take 1 twice a day      tirzepatide-Weight Management (ZEPBOUND) 2.5 MG/0.5ML prefilled pen Inject 0.5 mLs (2.5 mg) Subcutaneous every 7 days 2 mL 3    topiramate (TOPAMAX) 50 MG tablet 1/2 tablet in the evening. You may increase to 1 full tablet after 1 week. 90 tablet 1    vitamin D3 (CHOLECALCIFEROL) 125 MCG (5000 UT) tablet Take 5,000 Units by mouth      warfarin (COUMADIN) 2.5 MG tablet Take 2 tablets by mouth daily. 90 tablet 4    CHELATED IRON PO 25 mg 2 tablets twice a day (Patient not taking: Reported on 1/29/2024)         Allergies   Allergen Reactions    Tramadol Nausea     No relief and \"funny feeling\" after, not really effective      Trazodone Other (See Comments)     Made restless legs worse    Pcn [Penicillins] Hives          Lab Results    Chemistry/lipid CBC Cardiac Enzymes/BNP/TSH/INR   Recent Labs   Lab Test 09/12/23  0720   CHOL 177   HDL 63   LDL 91   TRIG 116     Recent Labs   Lab Test 09/12/23  0720 08/30/22  0734   LDL 91 104*     Recent Labs   Lab Test 01/15/24  1522      POTASSIUM 4.9   CHLORIDE 105   CO2 30*   *   BUN 10.8   CR 0.92   GFRESTIMATED 72   TAMIKA 9.5     Recent Labs   Lab Test 01/15/24  1522 09/20/23  1546 09/12/23  0720   CR 0.92 0.89 0.91     Recent Labs   Lab Test 09/12/23  0720 08/30/22  0734   A1C 5.5 5.5          Recent Labs   Lab Test 09/12/23  0720   WBC 6.1   HGB 12.8   HCT 39.1   MCV 86        Recent Labs   Lab Test 09/12/23  0720 08/30/22  0734   HGB 12.8 12.7    No results for input(s): \"TROPONINI\" in the last " "88661 hours.  No results for input(s): \"BNP\", \"NTBNPI\", \"NTBNP\" in the last 04864 hours.  No results for input(s): \"TSH\" in the last 93455 hours.  Recent Labs   Lab Test 09/20/23  1546   INR 3.14*        Anuja Cagle MD                       Thank you for allowing me to participate in the care of your patient.      Sincerely,     Anuja Cagle MD     [facility]  cc:   No referring provider defined for this encounter.      "

## 2024-02-09 NOTE — TELEPHONE ENCOUNTER
Note: Due to record-high volumes, our turn-around is time taking longer than normal . We are currently 8 working days behind in the pools.   We are working diligently to submit all requests in a timely manner and in the order they are received. Please only flag true urgent requests as high priority to the pool at this time.   If you have questions - please send a note/message in the active PA encounter and send back to the RPPA (Retail Pharmacy Prior Authorization) team [[id number]].    If you have more specific questions about our process please reach out to our supervisor Shani Charles.   Thank you!     PA Initiation    Medication: TIRZEPATIDE-WEIGHT MANAGEMENT 2.5 MG/0.5ML SC SOAJ  Insurance Company: [employer] - Phone [phone] Fax [phone]  Pharm[facility] Filling the Rx: [facility] [address]  Filling Pharmacy Phone: [phone]  Filling Pharmacy Fax: [phone]  Start Date: 2/9/2024

## 2024-02-09 NOTE — TELEPHONE ENCOUNTER
PRIOR AUTHORIZATION DENIED    Medication: TIRZEPATIDE-WEIGHT MANAGEMENT 2.5 MG/0.5ML SC SOAJ  Insurance Company: [employer] - Phone [phone] Fax [phone]  Denial Date: 2/9/2024  Denial Reason(s):     Appeal Information:     Patient Notified: No, care team must notify

## 2024-02-21 ENCOUNTER — HOSPITAL ENCOUNTER (OUTPATIENT)
Dept: CARDIOLOGY | Facility: HOSPITAL | Age: 59
Discharge: HOME | End: 2024-02-21
Attending: INTERNAL MEDICINE | Admitting: INTERNAL MEDICINE
Payer: COMMERCIAL

## 2024-02-21 DIAGNOSIS — Z95.2 S/P MVR (MITRAL VALVE REPLACEMENT): ICD-10-CM

## 2024-02-21 DIAGNOSIS — Z95.2 S/P AVR (AORTIC VALVE REPLACEMENT): ICD-10-CM

## 2024-02-21 LAB — LVEF ECHO: NORMAL

## 2024-02-21 PROCEDURE — 255N000002 HC RX 255 OP 636: Performed by: INTERNAL MEDICINE

## 2024-02-21 PROCEDURE — 93306 TTE W/DOPPLER COMPLETE: CPT | Mod: 26 | Performed by: INTERNAL MEDICINE

## 2024-02-21 RX ADMIN — PERFLUTREN 3 ML: 6.52 INJECTION, SUSPENSION INTRAVENOUS at 09:10

## 2024-02-27 ENCOUNTER — TELEPHONE (OUTPATIENT)
Dept: SURGERY | Facility: CLINIC | Age: 59
End: 2024-02-27
Payer: COMMERCIAL

## 2024-02-27 DIAGNOSIS — R09.89 DECREASED CARDIAC FUNCTION: ICD-10-CM

## 2024-02-27 DIAGNOSIS — Z95.2 S/P MVR (MITRAL VALVE REPLACEMENT): Primary | ICD-10-CM

## 2024-02-27 DIAGNOSIS — Z95.2 S/P AVR (AORTIC VALVE REPLACEMENT): ICD-10-CM

## 2024-02-27 DIAGNOSIS — E66.01 OBESITY, CLASS III, BMI 40-49.9 (MORBID OBESITY) (H): Primary | ICD-10-CM

## 2024-02-27 RX ORDER — METOPROLOL SUCCINATE 25 MG/1
25 TABLET, EXTENDED RELEASE ORAL DAILY
Qty: 30 TABLET | Refills: 5 | Status: SHIPPED | OUTPATIENT
Start: 2024-02-27 | End: 2024-08-05

## 2024-02-27 RX ORDER — SEMAGLUTIDE 0.5 MG/.5ML
0.5 INJECTION, SOLUTION SUBCUTANEOUS WEEKLY
Qty: 2 ML | Refills: 0 | Status: SHIPPED | OUTPATIENT
Start: 2024-02-27 | End: 2024-03-26

## 2024-02-27 RX ORDER — SEMAGLUTIDE 1 MG/.5ML
1 INJECTION, SOLUTION SUBCUTANEOUS WEEKLY
Qty: 2 ML | Refills: 0 | Status: SHIPPED | OUTPATIENT
Start: 2024-02-27 | End: 2024-06-20 | Stop reason: DRUGHIGH

## 2024-02-27 RX ORDER — SEMAGLUTIDE 0.25 MG/.5ML
0.25 INJECTION, SOLUTION SUBCUTANEOUS WEEKLY
Qty: 2 ML | Refills: 0 | Status: SHIPPED | OUTPATIENT
Start: 2024-02-27 | End: 2024-03-26

## 2024-03-06 ENCOUNTER — VIRTUAL VISIT (OUTPATIENT)
Dept: SURGERY | Facility: CLINIC | Age: 59
End: 2024-03-06
Payer: COMMERCIAL

## 2024-03-06 DIAGNOSIS — E66.01 MORBID OBESITY WITH BMI OF 40.0-44.9, ADULT (H): Primary | ICD-10-CM

## 2024-03-06 PROCEDURE — 97803 MED NUTRITION INDIV SUBSEQ: CPT | Mod: 95 | Performed by: DIETITIAN, REGISTERED

## 2024-03-06 NOTE — LETTER
3/6/2024         RE: Halina Ibrahim  [address] [place] [address]        Dear Colleague,    Thank you for referring your patient, Halina Ibrahim, to the [facility] [place]. Please see a copy of my visit note below.    Halina Ibrahim is a 58 year old who is being evaluated via a billable video visit.      Post-op Surgical Weight Loss Diet Evaluation     Assessment:  Pt presents for  19 years  post-op RD visit, s/p RNY GB on 9/14/2004. Today we reviewed current eating habits and level of physical activity, and instructed on the changes that are required for successful bariatric outcomes.    Patient Progress: since last visit she has been trying to cut back on soda (this has been a struggle for a while), working on protein intake. Currently she drinks some Pepsi Zero and regular pepsi.    Initial weight: 269 lbs  Current weight: 268 lbs (1/31/24)  Weight change: - 3 lbs (since last visit)    There is no height or weight on file to calculate BMI.    Patient Active Problem List   Diagnosis     Aortic valve disorder     S/P AVR     Restless leg syndrome     Morbid obesity (H)     History of cholecystectomy         Vitamins   Multi Vit with Iron: yes  Calcium Citrate: yes  B12: yes  D3: yes    Diet Recall/Time:   Breakfast: Half hour after waking up (9-10 am) - Bagel OR kashi Go Cereal OR [employer] Oatmeal or Breakfast Burrito salsa OR breakfast bowl turkey (Sánchez Tuttle)   Lunch: noon and 1 pm - leftovers OR mini tacos from the night before or occasional Sánchez Schulte or Piada with Avocado and Chicken, 3 meatballs with sauce, corn salad or Breakfast Bowl of Chicken Nuggets or cheese and crackers or tuna and crackers  Dinner: similar to lunch - wants to get back to cooking meat more often  -Hashimoto's, doesn't always have the energy to cook    Typical Snacks: sometimes sunflower seeds, piece of fruit, yogurt, cheese and crackers, popcorn  -If day is slow, she  "nibbles more     Estimated protein intake: 40-50 grams    Fluid Intake  Water: at least 64 oz/day  Caffeine: Pepsi One-working on reducing  18 oz of regular pepsi per day    Exercise: no set regimen     PES statement:      (NI-5.7.1) Inadequate protein intake related to Gastric bypass causing increased nutrient needs due to malabsorption/ Decreased ability to consume sufficient protein as evidenced by Estimated intake of protein insufficient to meet requirements    Intervention    Discussion    Recommended to consume 15-20gm protein at 3 meals daily, along with protein supplement/\"planned protein containing snack\" of 15-30gm protein, to reach goal of 60-80 gm protein daily.  Educated on post-op vitamin regimen: Multi Vit + iron 2x/day, calcium citrate 400-600 mg 2x/day, 6501-7036 mcg of Sublingual B-12 daily, and 5000 IU Vitamin D3 daily (MVI and calcium can be taken at the same time BID)  Reviewed lean protein sources  Bariatric Plate Method-  including lean/low fat protein at each meal, including a vegetable/fruit, and limiting carbohydrate intake to less than 25% of plate volume.     Instructions  Include 15-20gm protein at each meal, along with protein supplement/\"planned protein containing snack\" of 15-30gm protein, to reach goal of 60-80 gm protein daily.  Increase fluid intake to 64oz daily: choose plain or calorie/carbonation-free beverages.  Incorporate daily structured activity, 30-60 minutes most days of the week  Recommended pt to start taking: Multi Vit + iron 2x/day, calcium citrate 400-600 mg 2x/day, 5906-0888 mcg of Sublingual B-12 daily, and 5000 IU Vitamin D3 daily. (MVI and calcium can be taken at the same time)  Read food labels more consistently: keeping total fat grams <10, total sugar grams <10, fiber >3gm per serving.  Increase vegetable/fruit intake, by having a vegetable or fruit with each meal daily.  Practice plate method: 1/2 plate lean/low fat protein source, vegetable/fruit, <25% of " plate complex carbohydrates.  Separate fluids 30 minutes before/after meal times.  Practice eating off of smaller plates/bowls, chewing to applesauce consistency, taking 20-30 minutes to eat in a calm/relaxed environment without distractions of tv/email/cell phone.    Handouts provided:  None    Assessment/Plan:    Goals:  Try to do more Pepsi Zero, avoid regular Pepsi    Pt to follow up in 1 month with Bariatrician and 2 months with Dietitian      Video-Visit Details    Type of service:  Video Visit    Video Start Time (time video started):  2:04 PM    Video End Time (time video stopped):  2:20 PM    Originating Location (pt. Location): Home      Distant Location (provider location):  Off-site    Mode of Communication:  Video Conference via [facility]    Physician has received verbal consent for a Video Visit from the patient? Yes    Krystle Ballard RD            Again, thank you for allowing me to participate in the care of your patient.        Sincerely,        Selena Perdomo RD

## 2024-03-06 NOTE — PROGRESS NOTES
Halina Ibrahim is a 58 year old who is being evaluated via a billable video visit.      Post-op Surgical Weight Loss Diet Evaluation     Assessment:  Pt presents for  19 years  post-op RD visit, s/p RNY ЕЛЕНА on 9/14/2004. Today we reviewed current eating habits and level of physical activity, and instructed on the changes that are required for successful bariatric outcomes.    Patient Progress: since last visit she has been trying to cut back on soda (this has been a struggle for a while), working on protein intake. Currently she drinks some Pepsi Zero and regular pepsi.    Initial weight: 269 lbs  Current weight: 268 lbs (1/31/24)  Weight change: - 3 lbs (since last visit)    There is no height or weight on file to calculate BMI.    Patient Active Problem List   Diagnosis    Aortic valve disorder    S/P AVR    Restless leg syndrome    Morbid obesity (H)    History of cholecystectomy         Vitamins   Multi Vit with Iron: yes  Calcium Citrate: yes  B12: yes  D3: yes    Diet Recall/Time:   Breakfast: Half hour after waking up (9-10 am) - Bagel OR kashi Go Cereal OR [employer] Oatmeal or Breakfast Burrito salsa OR breakfast bowl turkey (Sánchez [place])   Lunch: noon and 1 pm - leftovers OR mini tacos from the night before or occasional Sánchez Schulte or Piada with Avocado and Chicken, 3 meatballs with sauce, corn salad or Breakfast Bowl of Chicken Nuggets or cheese and crackers or tuna and crackers  Dinner: similar to lunch - wants to get back to cooking meat more often  -Hashimoto's, doesn't always have the energy to cook    Typical Snacks: sometimes sunflower seeds, piece of fruit, yogurt, cheese and crackers, popcorn  -If day is slow, she nibbles more     Estimated protein intake: 40-50 grams    Fluid Intake  Water: at least 64 oz/day  Caffeine: Pepsi One-working on reducing  18 oz of regular pepsi per day    Exercise: no set regimen     PES statement:      (NI-5.7.1) Inadequate protein intake related to Gastric  "bypass causing increased nutrient needs due to malabsorption/ Decreased ability to consume sufficient protein as evidenced by Estimated intake of protein insufficient to meet requirements    Intervention    Discussion    Recommended to consume 15-20gm protein at 3 meals daily, along with protein supplement/\"planned protein containing snack\" of 15-30gm protein, to reach goal of 60-80 gm protein daily.  Educated on post-op vitamin regimen: Multi Vit + iron 2x/day, calcium citrate 400-600 mg 2x/day, 9272-3395 mcg of Sublingual B-12 daily, and 5000 IU Vitamin D3 daily (MVI and calcium can be taken at the same time BID)  Reviewed lean protein sources  Bariatric Plate Method-  including lean/low fat protein at each meal, including a vegetable/fruit, and limiting carbohydrate intake to less than 25% of plate volume.     Instructions  Include 15-20gm protein at each meal, along with protein supplement/\"planned protein containing snack\" of 15-30gm protein, to reach goal of 60-80 gm protein daily.  Increase fluid intake to 64oz daily: choose plain or calorie/carbonation-free beverages.  Incorporate daily structured activity, 30-60 minutes most days of the week  Recommended pt to start taking: Multi Vit + iron 2x/day, calcium citrate 400-600 mg 2x/day, 4814-3855 mcg of Sublingual B-12 daily, and 5000 IU Vitamin D3 daily. (MVI and calcium can be taken at the same time)  Read food labels more consistently: keeping total fat grams <10, total sugar grams <10, fiber >3gm per serving.  Increase vegetable/fruit intake, by having a vegetable or fruit with each meal daily.  Practice plate method: 1/2 plate lean/low fat protein source, vegetable/fruit, <25% of plate complex carbohydrates.  Separate fluids 30 minutes before/after meal times.  Practice eating off of smaller plates/bowls, chewing to applesauce consistency, taking 20-30 minutes to eat in a calm/relaxed environment without distractions of tv/email/cell phone.    Handouts " provided:  None    Assessment/Plan:    Goals:  Try to do more Pepsi Zero, avoid regular Pepsi    Pt to follow up in 1 month with Bariatrician and 2 months with Dietitian      Video-Visit Details    Type of service:  Video Visit    Video Start Time (time video started):  2:04 PM    Video End Time (time video stopped):  2:20 PM    Originating Location (pt. Location): Home      Distant Location (provider location):  Off-site    Mode of Communication:  Video Conference via [facility]    Physician has received verbal consent for a Video Visit from the patient? Yes    Selena Perdomo RD, LD

## 2024-04-24 ENCOUNTER — VIRTUAL VISIT (OUTPATIENT)
Dept: SURGERY | Facility: CLINIC | Age: 59
End: 2024-04-24
Payer: COMMERCIAL

## 2024-04-24 VITALS — BODY MASS INDEX: 43.28 KG/M2 | HEIGHT: 66 IN

## 2024-04-24 DIAGNOSIS — E66.01 MORBID OBESITY (H): Primary | ICD-10-CM

## 2024-04-24 DIAGNOSIS — Z86.69 HISTORY OF MIGRAINE HEADACHES: ICD-10-CM

## 2024-04-24 DIAGNOSIS — K91.2 POSTOPERATIVE MALABSORPTION: ICD-10-CM

## 2024-04-24 PROCEDURE — 99215 OFFICE O/P EST HI 40 MIN: CPT | Mod: 95 | Performed by: FAMILY MEDICINE

## 2024-04-24 ASSESSMENT — PAIN SCALES - GENERAL: PAINLEVEL: NO PAIN (0)

## 2024-04-24 NOTE — NURSING NOTE
Is the patient currently in the state of [place]? YES    Visit mode:VIDEO    If the visit is dropped, the patient can be reconnected by: VIDEO VISIT: Text to cell phone:   Telephone Information:   Mobile [phone]       Will anyone else be joining the visit? NO  (If patient encounters technical issues they should call [phone]    How would you like to obtain your AVS? MyChart    Are changes needed to the allergy or medication list? No    Are refills needed on medications prescribed by this physician? NO    Reason for visit: RECHECK    Amber SOTO

## 2024-04-24 NOTE — LETTER
4/24/2024         RE: Halina Ibrahim  [address] [address]        Dear Colleague,    Thank you for referring your patient, Halina Ibrahim, to the [facility] SURGERY CLINIC AND BARIATRICS CARE [place]. Please see a copy of my visit note below.    Virtual Visit Details    Type of service:  Video Visit     Originating Location (pt. Location): Home    Distant Location (provider location):  Off-site  Platform used for Video Visit: Clipboard  Video start time: 11:50 am  Video end time: 12:15 pm    Bariatric Care Clinic Non Surgical Follow up Visit   Date of visit: 4/24/2024  Physician: DURAN Glasgow MD, MD  Primary Care is Lu Carrillo  Halina Ibrahim   58 year old  female     Initial Weight: 296# prior to RNY in 2004  Today's Weight:   Wt Readings from Last 1 Encounters:   01/31/24 121.6 kg (268 lb)     Body mass index is 43.28 kg/m .           Assessment and Plan   Assessment: Halina is a 58 year old year old female who presents for medical weight management.      Plan:    1. Morbid obesity (H)  Patient was congratulated on her success thus far. Healthy habits to assist with further weight loss were discussed. She will add some muscle building activity. She will try to make sure she is eating protein first. She will continue the wegovy. We discussed the patient's co-morbid conditions including migraine headaches CAD. These likely will improve with healthy habits and weight loss.     2. History of migraine headaches  This may improve with healthy habits and weight loss.     3. Postoperative malabsorption  Patient is taking all vitamins as directed. She will be due for her annual post op visit in September    Follow up in 3 months with myself           INTERIM HISTORY  Patient has lorna taking wegovy for appetite and craving control and she thinks they are helping. She is now on 0.5 mg dose and she thinks she is eating a lot less. She denies side effects. She does have some fatigue but doesn't  know if it is related.    DIETARY HISTORY  Meals Per Day: 2-3  Eating Protein First?: sometimes  Food Diary: B:cereal or breakfast burrito or bagel or oatmeal with quinoa L: sandwich or breakfast food D:protein, sometimes vegetable, sometimes starch or sometimes skips  Snacks Per Day: rare  Fluid Intake: 64 oz  Portion Control: yes  Calorie Containing Beverages: soda, 12-24 oz. Plans to stop when her supply runs out.    Meals at Restaurant per week:0-1    Positive Changes Since Last Visit: decreased portions and snacking  Struggling With: soda intake, exercise    Knowledgeable in Reading Food Labels: yes  Getting Adequate Protein: sometimes    PHYSICAL ACTIVITY PATTERNS:  ADLs    REVIEW OF SYSTEMS  GENERAL/CONSTITUTIONAL:  Fatigue: yes  HEENT:   glaucoma: no  CARDIOVASCULAR:  History of heart disease: yes  GI:  Pancreatitis: no  PSYCHIATRIC:  Moods: stable  ENDOCRINE:  Monitoring Blood Sugars: no  Sugars Well Controlled: na  No personal or family history of medullary thyroid cancer no  :  Birth control: menopause  History of kidney stones: not discussed     Patient Profile   Social History     Social History Narrative     Not on file        Past Medical History   Past Medical History:   Diagnosis Date     Aortic regurgitation      Aortic valve replaced      Degenerative disc disease      Dyslipidemia      Hashimoto's disease      History of Sabrina-en-Y gastric bypass 9/14/2004    Dr. Macario Highest weight 296#     Hypertension      Low back pain      Metabolic syndrome      Mitral valve replaced      Morbid obesity (H)      Morbid obesity with BMI of 40.0-44.9, adult (H)      Other and unspecified postsurgical nonabsorption      S/P gastric bypass      S/P MVR (mitral valve replacement)      Stroke (H)      Stroke (H)     from mass near mitral valve     Patient Active Problem List   Diagnosis     Aortic valve disorder     S/P AVR     Restless leg syndrome     Morbid obesity (H)     History of cholecystectomy  "      Past Surgical History  She has a past surgical history that includes s/p mitral valve replacement[; s/p gastric bypass[; Redo sternotomy replace valve aortic (3/14/2013); tubal ligation; other surgical history; Revision Sabrina-En-Y; aortic valve replacement; and Replace valve mitral.     Examination   Ht 1.676 m (5' 5.98\")   BMI 43.28 kg/m    Wt Readings from Last 4 Encounters:   01/31/24 121.6 kg (268 lb)   01/29/24 123.4 kg (272 lb)   10/10/23 122 kg (269 lb)   08/01/23 119.5 kg (263 lb 6.4 oz)      BP Readings from Last 3 Encounters:   01/31/24 134/76   11/26/19 136/62   09/25/19 128/80    GENERAL: alert and no distress  EYES: Eyes grossly normal to inspection.  No discharge or erythema, or obvious scleral/conjunctival abnormalities.  RESP: No audible wheeze, cough, or visible cyanosis.    SKIN: Visible skin clear. No significant rash, abnormal pigmentation or lesions.  NEURO: Cranial nerves grossly intact.  Mentation and speech appropriate for age.  PSYCH: Appropriate affect, tone, and pace of words        Counseling:   We reviewed the important post op bariatric recommendations:  -eating 3 meals daily  -eating protein first, getting >60gm protein daily  -eating slowly, chewing food well  -avoiding/limiting calorie containing beverages  -limiting starchy vegetables and carbohydrates, choosing wheat, not white with breads,   crackers, pastas, madelyn, bagels, tortillas, rice  -limiting restaurant or cafeteria eating to twice a week or less    We discussed the importance of restorative sleep and stress management in maintaining a healthy weight.  We discussed the National Weight Control Registry healthy weight maintenance strategies and ways to optimize metabolism.  We discussed the importance of physical activity including cardiovascular and strength training in maintaining a healthier weight.    Total time spent on the date of this encounter doing: chart review, review of test results, patient visit, physical " exam, education, counseling, developing plan of care and documenting = 40                 Again, thank you for allowing me to participate in the care of your patient.        Sincerely,        DURAN Glasgow MD

## 2024-04-24 NOTE — PATIENT INSTRUCTIONS
Here are the OROS exercise sites:    Yoga-https://www.OROS.com/user/yogawithadriene    Body strength activities, dance, high intensity interval training- https://www.OROS.com/user/popsugartvfit    Strength training- https://www.OROS.[employer]/user/KozakSportsPerform    Walking- https://www.OROS.com/user/walkathomemedia    Sit and Be Fit- https://www.OROS.[employer]/channel/UCLgvL3aGzMByecNYtMcyK_g    Low impact cardio- Ronit Fung- https://www.OROS.com/channel/SFwfXEiDtcaWkdjH8wcbparE    Cardio Yecenia Paniagua- https://www.OROS.com/channel/RATtAgrj3OQgZDNDV0hVvVIs    30 Min low impact cardio- https://www.SCLube.com/watch?v=gC_L9qAHVJ8    Body Project- https://www.OROS.[employer]/channel/JXKza8G00-UzFtYNyPrmJ6BW

## 2024-04-24 NOTE — PROGRESS NOTES
Virtual Visit Details    Type of service:  Video Visit     Originating Location (pt. Location): Home    Distant Location (provider location):  Off-site  Platform used for Video Visit: [employer]  Video start time: 11:50 am  Video end time: 12:15 pm    [facility] Non Surgical Follow up Visit   Date of visit: 4/24/2024  Physician: DURAN Glasgow MD, MD  Primary Care is Lu Carrillo  Halina ANIKA Patrice   58 year old  female     Initial Weight: 296# prior to RNY in 2004  Today's Weight:   Wt Readings from Last 1 Encounters:   01/31/24 121.6 kg (268 lb)     Body mass index is 43.28 kg/m .           Assessment and Plan   Assessment: Halina is a 58 year old year old female who presents for medical weight management.      Plan:    1. Morbid obesity (H)  Patient was congratulated on her success thus far. Healthy habits to assist with further weight loss were discussed. She will add some muscle building activity. She will try to make sure she is eating protein first. She will continue the wegovy. We discussed the patient's co-morbid conditions including migraine headaches CAD. These likely will improve with healthy habits and weight loss.     2. History of migraine headaches  This may improve with healthy habits and weight loss.     3. Postoperative malabsorption  Patient is taking all vitamins as directed. She will be due for her annual post op visit in September    Follow up in 3 months with myself           INTERIM HISTORY  Patient has lorna taking wegovy for appetite and craving control and she thinks they are helping. She is now on 0.5 mg dose and she thinks she is eating a lot less. She denies side effects. She does have some fatigue but doesn't know if it is related.    DIETARY HISTORY  Meals Per Day: 2-3  Eating Protein First?: sometimes  Food Diary: B:cereal or breakfast burrito or bagel or oatmeal with quinoa L: sandwich or breakfast food D:protein, sometimes vegetable, sometimes starch or sometimes  skips  Snacks Per Day: rare  Fluid Intake: 64 oz  Portion Control: yes  Calorie Containing Beverages: soda, 12-24 oz. Plans to stop when her supply runs out.    Meals at Restaurant per week:0-1    Positive Changes Since Last Visit: decreased portions and snacking  Struggling With: soda intake, exercise    Knowledgeable in Reading Food Labels: yes  Getting Adequate Protein: sometimes    PHYSICAL ACTIVITY PATTERNS:  ADLs    REVIEW OF SYSTEMS  GENERAL/CONSTITUTIONAL:  Fatigue: yes  HEENT:   glaucoma: no  CARDIOVASCULAR:  History of heart disease: yes  GI:  Pancreatitis: no  PSYCHIATRIC:  Moods: stable  ENDOCRINE:  Monitoring Blood Sugars: no  Sugars Well Controlled: na  No personal or family history of medullary thyroid cancer no  :  Birth control: menopause  History of kidney stones: not discussed     Patient Profile   Social History     Social History Narrative    Not on file        Past Medical History   Past Medical History:   Diagnosis Date    Aortic regurgitation     Aortic valve replaced     Degenerative disc disease     Dyslipidemia     Hashimoto's disease     History of Sabrina-en-Y gastric bypass 9/14/2004    Dr. Macario Highest weight 296#    Hypertension     Low back pain     Metabolic syndrome     Mitral valve replaced     Morbid obesity (H)     Morbid obesity with BMI of 40.0-44.9, adult (H)     Other and unspecified postsurgical nonabsorption     S/P gastric bypass     S/P MVR (mitral valve replacement)     Stroke (H)     Stroke (H)     from mass near mitral valve     Patient Active Problem List   Diagnosis    Aortic valve disorder    S/P AVR    Restless leg syndrome    Morbid obesity (H)    History of cholecystectomy       Past Surgical History  She has a past surgical history that includes s/p mitral valve replacement[; s/p gastric bypass[; Redo sternotomy replace valve aortic (3/14/2013); tubal ligation; other surgical history; Revision Sabrina-En-Y; aortic valve replacement; and Replace valve mitral.  "    Examination   Ht 1.676 m (5' 5.98\")   BMI 43.28 kg/m    Wt Readings from Last 4 Encounters:   01/31/24 121.6 kg (268 lb)   01/29/24 123.4 kg (272 lb)   10/10/23 122 kg (269 lb)   08/01/23 119.5 kg (263 lb 6.4 oz)      BP Readings from Last 3 Encounters:   01/31/24 134/76   11/26/19 136/62   09/25/19 128/80    GENERAL: alert and no distress  EYES: Eyes grossly normal to inspection.  No discharge or erythema, or obvious scleral/conjunctival abnormalities.  RESP: No audible wheeze, cough, or visible cyanosis.    SKIN: Visible skin clear. No significant rash, abnormal pigmentation or lesions.  NEURO: Cranial nerves grossly intact.  Mentation and speech appropriate for age.  PSYCH: Appropriate affect, tone, and pace of words        Counseling:   We reviewed the important post op bariatric recommendations:  -eating 3 meals daily  -eating protein first, getting >60gm protein daily  -eating slowly, chewing food well  -avoiding/limiting calorie containing beverages  -limiting starchy vegetables and carbohydrates, choosing wheat, not white with breads,   crackers, pastas, madelyn, bagels, tortillas, rice  -limiting restaurant or cafeteria eating to twice a week or less    We discussed the importance of restorative sleep and stress management in maintaining a healthy weight.  We discussed the National Weight Control Registry healthy weight maintenance strategies and ways to optimize metabolism.  We discussed the importance of physical activity including cardiovascular and strength training in maintaining a healthier weight.    Total time spent on the date of this encounter doing: chart review, review of test results, patient visit, physical exam, education, counseling, developing plan of care and documenting = 40             "

## 2024-05-24 ENCOUNTER — VIRTUAL VISIT (OUTPATIENT)
Dept: SURGERY | Facility: CLINIC | Age: 59
End: 2024-05-24
Payer: COMMERCIAL

## 2024-05-24 DIAGNOSIS — E66.01 OBESITY, CLASS III, BMI 40-49.9 (MORBID OBESITY) (H): Primary | ICD-10-CM

## 2024-05-24 DIAGNOSIS — K91.2 POSTOPERATIVE MALABSORPTION: ICD-10-CM

## 2024-05-24 DIAGNOSIS — Z98.84 BARIATRIC SURGERY STATUS: ICD-10-CM

## 2024-05-24 DIAGNOSIS — K90.9 INTESTINAL MALABSORPTION, UNSPECIFIED TYPE: ICD-10-CM

## 2024-05-24 PROCEDURE — 97803 MED NUTRITION INDIV SUBSEQ: CPT | Mod: 95 | Performed by: DIETITIAN, REGISTERED

## 2024-05-24 NOTE — LETTER
"    5/24/2024         RE: Halina Ibrahim  [address] [place] [address]        Dear Colleague,    Thank you for referring your patient, Halina Ibrahim, to the [facility] [place]. Please see a copy of my visit note below.    Halina Ibrahim is a 58 year old who is being evaluated via a billable video visit.      How would you like to obtain your AVS? MyChart  If the video visit is dropped, the invitation should be resent by: Text to cell phone: [phone]  Will anyone else be joining your video visit? No  {If patient encounters technical issues they should call [phone]      Post-op Surgical Weight Loss Diet Evaluation     Assessment:  Pt presents for 19 years post-op RD visit, s/p RNY on 9/2004. Today we reviewed current eating habits and level of physical activity, and instructed on the changes that are required for successful bariatric outcomes.    Weight loss medication: Wegovy    Patient Progress: Patient stated that things have been going well. Noticed a difference with Wegovy and portion sizes - prioritizing protein with meals.    Initial weight: 296 lbs   Current weight: n/a  Weight change: n/a    BMI: 43.9 (1/31/2024)    Patient Active Problem List   Diagnosis     Aortic valve disorder     S/P AVR     Restless leg syndrome     Morbid obesity (H)     History of cholecystectomy       Diabetes: no    Vitamins   Multi Vit with Iron: yes  Calcium Citrate: yes  B12: yes  D3: yes    Do you have \"dumping\" syndrome? no - aware of what foods cause issues   Do you experience any reflux or discomfort with eating? no  Nausea: no  Vomiting: no  Diarrhea: no  Constipation: no    Diet Recall/Time:   Breakfast: bagel OR high fiber/protein cereal OR breakfast burritos  Lunch: leftovers OR high protein/fiber cereal OR turkey and swiss sandwich OR meat cheese and crackers  Dinner: chicken nuggets (at home) OR judy nolan (turkey) breakfast bowls    Typical Snacks: " "sunflower seeds, fruit,    Proteins/Veg/Fruits/CHO (NOT well tolerated): none    Estimated protein intake: < 60 grams    Estimated portion size per meals:1 - 1.5 cup/meal    Meals per week away from home: 1-2x/week    Meal Duration: 20 - 30 minutes    Fluid-meal separation:  Fluids are not  30min before and 30 minutes after meals.    Fluid Intake  Water: 60+ ounces/day  Caffeine: pop  Alcohol: none  Carbonation: Pepsi Zero (trying to quick regular Pepsi) 1 can/day    Exercise:   No set routine at this time  Gardening, chores around the house      PES statement:      (NC-1.4) Altered GI Function related to Alteration in gastrointestinal tract structure and/or function/ Decreased functional length of the GI tract as evidenced by Gastric bypass surgery    (NI-5.7.1) Inadequate protein intake related to Gastric bypass causing increased nutrient needs due to malabsorption/ Decreased ability to consume sufficient protein as evidenced by Estimated intake of protein insufficient to meet requirements    Intervention    Discussion  Recommended to consume 15-20gm protein at 3 meals daily, along with protein supplement/\"planned protein containing snack\" of 15-30gm protein, to reach goal of 60-80 gm protein daily.  Educated on post-op vitamin regimen: Multi Vit + iron 2x/day, calcium citrate 400-600 mg 2x/day, 2859-5724 mcg of Sublingual B-12 daily, and 5000 IU Vitamin D3 daily (MVI and calcium can be taken at the same time BID)  Reviewed lean protein sources  Emphasized prioritizing protein intake with meals    Instructions  Include 15-20gm protein at each meal, along with protein supplement/\"planned protein containing snack\" of 15-30gm protein, to reach goal of 60-80 gm protein daily.  Increase fluid intake to 64oz daily: choose plain or calorie/carbonation-free beverages.  Incorporate daily structured activity, 30-60 minutes most days of the week  Recommended pt to start taking: Multi Vit + iron 2x/day, calcium " citrate 400-600 mg 2x/day, 4705-3800 mcg of Sublingual B-12 daily, and 5000 IU Vitamin D3 daily. (MVI and calcium can be taken at the same time)  Read food labels more consistently: keeping total fat grams <10, total sugar grams <10, fiber >3gm per serving.  Increase vegetable/fruit intake, by having a vegetable or fruit with each meal daily.  Practice plate method: 1/2 plate lean/low fat protein source, vegetable/fruit, <25% of plate complex carbohydrates.  Separate fluids 30 minutes before/after meal times.  Practice eating off of smaller plates/bowls, chewing to applesauce consistency, taking 20-30 minutes to eat in a calm/relaxed environment without distractions of tv/email/cell phone.    Handouts provided:  None at this time    Assessment/Plan:    Pt to follow up for  20 year  post-op visit with bariatrician and RD in 2 months    Video-Visit Details    Type of service:  Video Visit    Video Start Time (time video started):  1:30 PM    Video End Time (time video stopped):  1:48 PM    Originating Location (pt. Location): Home      Distant Location (provider location):  Off-site    Mode of Communication:  Video Conference via [facility]    Physician has received verbal consent for a Video Visit from the patient? Yes    Wanda Temple RD            Again, thank you for allowing me to participate in the care of your patient.        Sincerely,        Wanda Temple RD

## 2024-05-24 NOTE — PROGRESS NOTES
"Halina Ibrahim is a 58 year old who is being evaluated via a billable video visit.      How would you like to obtain your AVS? MyChart  If the video visit is dropped, the invitation should be resent by: Text to cell phone: [phone]  Will anyone else be joining your video visit? No  {If patient encounters technical issues they should call [phone]      Post-op Surgical Weight Loss Diet Evaluation     Assessment:  Pt presents for 19 years post-op RD visit, s/p RNY on 9/2004. Today we reviewed current eating habits and level of physical activity, and instructed on the changes that are required for successful bariatric outcomes.    Weight loss medication: Wegovy    Patient Progress: Patient stated that things have been going well. Noticed a difference with Wegovy and portion sizes - prioritizing protein with meals.    Initial weight: 296 lbs   Current weight: n/a  Weight change: n/a    BMI: 43.9 (1/31/2024)    Patient Active Problem List   Diagnosis    Aortic valve disorder    S/P AVR    Restless leg syndrome    Morbid obesity (H)    History of cholecystectomy       Diabetes: no    Vitamins   Multi Vit with Iron: yes  Calcium Citrate: yes  B12: yes  D3: yes    Do you have \"dumping\" syndrome? no - aware of what foods cause issues   Do you experience any reflux or discomfort with eating? no  Nausea: no  Vomiting: no  Diarrhea: no  Constipation: no    Diet Recall/Time:   Breakfast: bagel OR high fiber/protein cereal OR breakfast burritos  Lunch: leftovers OR high protein/fiber cereal OR turkey and swiss sandwich OR meat cheese and crackers  Dinner: chicken nuggets (at home) OR judy nolan (turkey) breakfast bowls    Typical Snacks: sunflower seeds, fruit,    Proteins/Veg/Fruits/CHO (NOT well tolerated): none    Estimated protein intake: < 60 grams    Estimated portion size per meals:1 - 1.5 cup/meal    Meals per week away from home: 1-2x/week    Meal Duration: 20 - 30 minutes    Fluid-meal " "separation:  Fluids are not  30min before and 30 minutes after meals.    Fluid Intake  Water: 60+ ounces/day  Caffeine: pop  Alcohol: none  Carbonation: Pepsi Zero (trying to quick regular Pepsi) 1 can/day    Exercise:   No set routine at this time  Gardening, chores around the house      PES statement:      (NC-1.4) Altered GI Function related to Alteration in gastrointestinal tract structure and/or function/ Decreased functional length of the GI tract as evidenced by Gastric bypass surgery    (NI-5.7.1) Inadequate protein intake related to Gastric bypass causing increased nutrient needs due to malabsorption/ Decreased ability to consume sufficient protein as evidenced by Estimated intake of protein insufficient to meet requirements    Intervention    Discussion  Recommended to consume 15-20gm protein at 3 meals daily, along with protein supplement/\"planned protein containing snack\" of 15-30gm protein, to reach goal of 60-80 gm protein daily.  Educated on post-op vitamin regimen: Multi Vit + iron 2x/day, calcium citrate 400-600 mg 2x/day, 3451-1265 mcg of Sublingual B-12 daily, and 5000 IU Vitamin D3 daily (MVI and calcium can be taken at the same time BID)  Reviewed lean protein sources  Emphasized prioritizing protein intake with meals    Instructions  Include 15-20gm protein at each meal, along with protein supplement/\"planned protein containing snack\" of 15-30gm protein, to reach goal of 60-80 gm protein daily.  Increase fluid intake to 64oz daily: choose plain or calorie/carbonation-free beverages.  Incorporate daily structured activity, 30-60 minutes most days of the week  Recommended pt to start taking: Multi Vit + iron 2x/day, calcium citrate 400-600 mg 2x/day, 3946-5426 mcg of Sublingual B-12 daily, and 5000 IU Vitamin D3 daily. (MVI and calcium can be taken at the same time)  Read food labels more consistently: keeping total fat grams <10, total sugar grams <10, fiber >3gm per serving.  Increase " vegetable/fruit intake, by having a vegetable or fruit with each meal daily.  Practice plate method: 1/2 plate lean/low fat protein source, vegetable/fruit, <25% of plate complex carbohydrates.  Separate fluids 30 minutes before/after meal times.  Practice eating off of smaller plates/bowls, chewing to applesauce consistency, taking 20-30 minutes to eat in a calm/relaxed environment without distractions of tv/email/cell phone.    Handouts provided:  None at this time    Assessment/Plan:    Pt to follow up for  20 year  post-op visit with bariatrician and RD in 2 months    Video-Visit Details    Type of service:  Video Visit    Video Start Time (time video started):  1:30 PM    Video End Time (time video stopped):  1:48 PM    Originating Location (pt. Location): Home      Distant Location (provider location):  Off-site    Mode of Communication:  Video Conference via [facility]    Physician has received verbal consent for a Video Visit from the patient? Yes    Wanda Temple RD

## 2024-06-20 ENCOUNTER — MYC MEDICAL ADVICE (OUTPATIENT)
Dept: SURGERY | Facility: CLINIC | Age: 59
End: 2024-06-20
Payer: COMMERCIAL

## 2024-06-20 DIAGNOSIS — E66.01 MORBID OBESITY (H): ICD-10-CM

## 2024-06-22 ENCOUNTER — HEALTH MAINTENANCE LETTER (OUTPATIENT)
Age: 59
End: 2024-06-22

## 2024-07-26 ENCOUNTER — TELEPHONE (OUTPATIENT)
Dept: SURGERY | Facility: CLINIC | Age: 59
End: 2024-07-26

## 2024-07-26 ENCOUNTER — OFFICE VISIT (OUTPATIENT)
Dept: CARDIOLOGY | Facility: CLINIC | Age: 59
End: 2024-07-26
Payer: COMMERCIAL

## 2024-07-26 VITALS
DIASTOLIC BLOOD PRESSURE: 88 MMHG | WEIGHT: 265 LBS | HEIGHT: 66 IN | BODY MASS INDEX: 42.59 KG/M2 | RESPIRATION RATE: 16 BRPM | SYSTOLIC BLOOD PRESSURE: 144 MMHG | HEART RATE: 58 BPM

## 2024-07-26 DIAGNOSIS — R09.89 DECREASED CARDIAC FUNCTION: ICD-10-CM

## 2024-07-26 DIAGNOSIS — Z95.2 S/P MVR (MITRAL VALVE REPLACEMENT): ICD-10-CM

## 2024-07-26 DIAGNOSIS — K90.9 INTESTINAL MALABSORPTION, UNSPECIFIED TYPE: Primary | ICD-10-CM

## 2024-07-26 DIAGNOSIS — Z95.2 S/P AVR (AORTIC VALVE REPLACEMENT): ICD-10-CM

## 2024-07-26 DIAGNOSIS — I25.5 ISCHEMIC CARDIOMYOPATHY: ICD-10-CM

## 2024-07-26 DIAGNOSIS — R06.09 DYSPNEA ON EXERTION: Primary | ICD-10-CM

## 2024-07-26 DIAGNOSIS — Z98.84 S/P GASTRIC BYPASS: ICD-10-CM

## 2024-07-26 PROCEDURE — 99214 OFFICE O/P EST MOD 30 MIN: CPT | Performed by: INTERNAL MEDICINE

## 2024-07-26 RX ORDER — CLOBETASOL PROPIONATE 0.5 MG/G
OINTMENT TOPICAL PRN
COMMUNITY

## 2024-07-26 RX ORDER — TRETINOIN 0.5 MG/G
CREAM TOPICAL AT BEDTIME
COMMUNITY

## 2024-07-26 NOTE — LETTER
7/26/2024    ROSLYN S SHEN  [address]  [place] [address]    RE: Halina Ibrahim       Dear Colleague,     I had the pleasure of seeing Halina Ibrahim in the [facility].    HEART CARE ENCOUNTER CONSULTATON NOTE      [facility]  [phone]      Assessment/Recommendations   Assessment:  1.  Valvular heart disease: Status post left atrial myxoma resection with subsequent mechanical mitral valve replacement with a 27 mm bileaflet Saint Polo mechanical valve.  In 2013 she had a mechanical aortic valve replacement with 21 mm bileaflet Saint Polo valve due to residual severe aortic insufficiency post previous surgery.  2.  Anticoagulation: Maintained on Coumadin for anticoagulation goal INR of 2.5-3.5  3.  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  Morbid obesity  6.  MOLLY on CPAP  7.  Hashimoto's thyroiditis  8.  Hypertension: Well-controlled  9. Mild cardiomyopathy likely nonischemic.  With risk factors and no recent evaluation with proceed with CT coronary angiogram        Plan:  1.  CT coronary angiogram  2.  Repeat echocardiogram in 6 months and follow up  3.  Continue on losartan, low dose toprol XL  4.  Continue on Coumadin for anticoagulation         History of Present Illness/Subjective    HPI: Halina Ibrahim is a 58 year old female with history of valvular heart disease status post mechanical MVR and mechanical AVR in 2010 2013 respectively, embolic CVA in 2010, morbid obesity, Hashimoto's thyroiditis, MOLLY on CPAP, hypertension who I am seeing today to establish care. In 2010 she suffered embolic CVA and has residual right visual field cut. 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 During the surgery they were able to preserve her valve and she underwent mechanical mitral valve replacement with a 27 mm bileaflet  "Saint Polo mechanical valve. In 2013 she had a mechanical aortic valve replacement with 21 mm bileaflet Saint Polo valve due to residual severe aortic insufficiency post previous surgery     I saw her for an initial visit to [facility] care 6 months ago.  She had not been seen since 2018 . Echocardiogram now shows mild cardiomyopathy otherwise valves functioning well. She enjoys gardening and has stairs in her apartment.  She has not noted increased dyspnea  and denies chest pain.    Recent Echocardiogram Results: 2/21/24  The left ventricle is normal in size with normal left ventricular wall  thickness. Left ventricular function is decreased. The ejection fraction is  45-50% (mildly reduced).  The right ventricle is normal size with mildly decreased right ventricular  systolic function  There is a bi-leaflet (St. Polo) mechanical prosthesis. Normal prosthetic  mitral valve gradients with a mean gradient of 5 mmHg.  There is a bi-leaflet (St. Polo) aortic mechanical prosthesis. The peak and  mean prosthetic valve gradients are elevated at 26 and 19 mmHg, respectively).     Compared to the prior study of 2018 the mean mitral gradient has decreased.         Physical Examination  Review of Systems   Vitals: BP (!) 144/88 (BP Location: Right arm, Patient Position: Sitting, Cuff Size: Adult Large)   Pulse 58   Resp 16   Ht 1.676 m (5' 5.98\")   Wt 120.2 kg (265 lb)   BMI 42.80 kg/m    BMI= Body mass index is 42.8 kg/m .  Wt Readings from Last 3 Encounters:   07/26/24 120.2 kg (265 lb)   01/31/24 121.6 kg (268 lb)   01/29/24 123.4 kg (272 lb)       General Appearance:   no distress, normal body habitus   ENT/Mouth: membranes moist, no oral lesions or bleeding gums.      EYES:  no scleral icterus, normal conjunctivae       Chest/Lungs:   lungs are clear to auscultation   Cardiovascular:   Regular. Normal first and second heart sounds with mechanical S1 S2 no edema bilaterally        Extremities: no cyanosis or clubbing "   Skin: no xanthelasma, warm.    Neurologic: normal  bilateral, no tremors     Psychiatric: alert and oriented x3, calm        Please refer above for cardiac ROS details.        Medical History  Surgical History Family History Social History   Past Medical History:   Diagnosis Date    Aortic regurgitation     Aortic valve replaced     Degenerative disc disease     Dyslipidemia     Hashimoto's disease     History of Nany-en-Y gastric bypass 2004    Dr. Macario Highest weight 296#    Hypertension     Low back pain     Metabolic syndrome     Mitral valve replaced     Morbid obesity (H)     Morbid obesity with BMI of 40.0-44.9, adult (H)     Other and unspecified postsurgical nonabsorption     S/P gastric bypass     S/P MVR (mitral valve replacement)     Stroke (H)     Stroke (H)     from mass near mitral valve     Past Surgical History:   Procedure Laterality Date    AORTIC VALVE REPLACEMENT      OTHER SURGICAL HISTORY      uterine ablation    REDO STERNOTOMY REPLACE VALVE AORTIC  3/14/2013    Procedure: REDO STERNOTOMY REPLACE VALVE AORTIC;  Redo-Median  Sternotomy, Aortic root enlargement . aortic Valve  Replacement  on pump oxygenator;  Surgeon: Buck Paula MD;  Location: [facility] OR    REPLACE VALVE MITRAL      REVISION NANY-EN-Y      s/p gastric bypass[      s/p mitral valve replacement[      TUBAL LIGATION       Family History   Problem Relation Age of Onset    Cancer Mother     Heart Disease Father     Heart Disease Sister     Obesity Sister     Cancer Maternal Grandmother         Social History     Socioeconomic History    Marital status: Single     Spouse name: Not on file    Number of children: Not on file    Years of education: Not on file    Highest education level: Not on file   Occupational History    Not on file   Tobacco Use    Smoking status: Former     Current packs/day: 0.00     Types: Cigarettes     Quit date: 3/17/2010     Years since quittin.3    Smokeless tobacco: Never    Vaping Use    Vaping status: Never Used   Substance and Sexual Activity    Alcohol use: No    Drug use: No    Sexual activity: Never     Birth control/protection: Surgical   Other Topics Concern    Parent/sibling w/ CABG, MI or angioplasty before 65F 55M? Not Asked   Social History Narrative    Not on file     Social Determinants of Health     Financial Resource Strain: Low Risk  (10/30/2023)    [facility]    Financial Resource Strain     Difficulty of Paying Living Expenses: 3     Difficulty of Paying Living Expenses: Not on file   Food Insecurity: No Food Insecurity (10/30/2023)    [facility]    Food Insecurity     Worried About Running Out of Food in the Last Year: 1   Transportation Needs: No Transportation Needs (10/30/2023)    Received [facility], [facility]    Transportation Needs     Lack of Transportation (Medical): 1   Physical Activity: Not on file   Stress: Not on file (2/11/2021)   Social Connections: Socially Integrated (10/30/2023)    Received from [facility], [facility]    Social Connections     Frequency of Communication with Friends and Family: 0   Interpersonal Safety: Low Risk  (4/13/2021)    Received from [employer], [facility]    Intimate Partner Violence     Insults You: Not on file     Threatens You: Not on file     Screams at You: Not on file     Physically Hurt: Not on file     Intimate Partner Violence Score: Not on file   Housing Stability: Low Risk  (10/30/2023)    Received from [facility], [facility]    Housing Stability     Unable to Pay for Housing in the Last Year: 1      "      Medications  Allergies   Current Outpatient Medications   Medication Sig Dispense Refill    B Complex-C (SUPER B COMPLEX) TABS Take 1 BID      BIOTIN PO Take 10,000 mcg by mouth daily      Calcium 600 MG tablet Take 1 tablet by mouth 2 times daily.      citalopram (CELEXA) 10 MG tablet Take 10 mg by mouth daily      clindamycin (CLEOCIN) 300 MG capsule TAKE 2 CAPSULES 1 HOUR PRIOR TO DENTAL APPOINTMENTS.      clobetasol (TEMOVATE) 0.05 % external ointment Apply topically as needed      gabapentin (NEURONTIN) 100 MG capsule TAKE  1 CAPSULE IN ADDITION  MG PRN.      gabapentin (NEURONTIN) 300 MG capsule Take 3 capsules by mouth at bedtime      levothyroxine (SYNTHROID/LEVOTHROID) 175 MCG tablet       losartan (COZAAR) 100 MG tablet Take 100 mg by mouth      metoprolol succinate ER (TOPROL XL) 25 MG 24 hr tablet Take 1 tablet (25 mg) by mouth daily 30 tablet 5    morphine (MS CONTIN) 15 MG CR tablet Take 15 mg by mouth every 12 hours      Multiple Vitamin (DAILY MULTIVITAMIN PO) Take 1 twice a day      topiramate (TOPAMAX) 50 MG tablet 1/2 tablet in the evening. You may increase to 1 full tablet after 1 week. 90 tablet 1    tretinoin (RETIN-A) 0.05 % external cream Apply topically at bedtime      vitamin D3 (CHOLECALCIFEROL) 125 MCG (5000 UT) tablet Take 5,000 Units by mouth      warfarin (COUMADIN) 2.5 MG tablet Take 2 tablets by mouth daily. 90 tablet 4       Allergies   Allergen Reactions    Tramadol Nausea     No relief and \"funny feeling\" after, not really effective      Trazodone Other (See Comments)     Made restless legs worse    Pcn [Penicillins] Hives          Lab Results    Chemistry/lipid CBC Cardiac Enzymes/BNP/TSH/INR   Recent Labs   Lab Test 09/12/23  0720   CHOL 177   HDL 63   LDL 91   TRIG 116     Recent Labs   Lab Test 09/12/23  0720 08/30/22  0734   LDL 91 104*     Recent Labs   Lab Test 01/15/24  1522      POTASSIUM 4.9   CHLORIDE 105   CO2 30*   *   BUN 10.8   CR 0.92 " "  GFRESTIMATED 72   TAMIKA 9.5     Recent Labs   Lab Test 01/15/24  1522 09/20/23  1546 09/12/23  0720   CR 0.92 0.89 0.91     Recent Labs   Lab Test 09/12/23  0720 08/30/22  0734   A1C 5.5 5.5          Recent Labs   Lab Test 09/12/23  0720   WBC 6.1   HGB 12.8   HCT 39.1   MCV 86        Recent Labs   Lab Test 09/12/23  0720 08/30/22  0734   HGB 12.8 12.7    No results for input(s): \"TROPONINI\" in the last 98406 hours.  No results for input(s): \"BNP\", \"NTBNPI\", \"NTBNP\" in the last 37496 hours.  No results for input(s): \"TSH\" in the last 86066 hours.  Recent Labs   Lab Test 09/20/23  1546   INR 3.14*        Anuja Cagle MD    Thank you for allowing me to participate in the care of your patient.      Sincerely,   Anuja Cagle MD     [facility]  cc:   Anuja Cagle MD  [address]  [address]  [place] [address]      "

## 2024-07-26 NOTE — TELEPHONE ENCOUNTER
20 yrs post op lab orders placed for patient in preparation for appointment with  in September. Appointment made for [facility] lab on 8/27/2024.    Vivian Steiner RN, CBN  [facility]  P [phone]  F [phone]

## 2024-07-26 NOTE — TELEPHONE ENCOUNTER
Pt. would like to schedule labs and states that the [facility] does schedule labs. Please advise Pt. What number she would call to schedule those labs. Pt. Has an  appointment with Julia Glasgow in September and her lab request is not in [facility] yet. Please put labs for the Appointment in  [facility]. Please send Pt. A message in [place] when the labs are in [facility] so she can make an appointment.

## 2024-07-26 NOTE — PROGRESS NOTES
HEART CARE ENCOUNTER CONSULTATON KARUNA      [facility]  [phone]      Assessment/Recommendations   Assessment:  1.  Valvular heart disease: Status post left atrial myxoma resection with subsequent mechanical mitral valve replacement with a 27 mm bileaflet Saint Polo mechanical valve.  In 2013 she had a mechanical aortic valve replacement with 21 mm bileaflet Saint [place]o valve due to residual severe aortic insufficiency post previous surgery.  2.  Anticoagulation: Maintained on Coumadin for anticoagulation goal INR of 2.5-3.5  3.  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  Morbid obesity  6.  MOLLY on CPAP  7.  Hashimoto's thyroiditis  8.  Hypertension: Well-controlled  9. Mild cardiomyopathy likely nonischemic.  With risk factors and no recent evaluation with proceed with CT coronary angiogram        Plan:  1.  CT coronary angiogram  2.  Repeat echocardiogram in 6 months and follow up  3.  Continue on losartan, low dose toprol XL  4.  Continue on Coumadin for anticoagulation         History of Present Illness/Subjective    HPI: Halina Ibrahim is a 58 year old female with history of valvular heart disease status post mechanical MVR and mechanical AVR in 2010 2013 respectively, embolic CVA in 2010, morbid obesity, Hashimoto's thyroiditis, MOLLY on CPAP, hypertension who I am seeing today to establish care. In 2010 she suffered embolic CVA and has residual right visual field cut. 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 During the surgery they were able to preserve her valve and she underwent mechanical mitral valve replacement with a 27 mm bileaflet Saint Polo mechanical valve. In 2013 she had a mechanical aortic valve replacement with 21 mm bileaflet Saint Polo valve due to residual severe aortic insufficiency post previous surgery     I saw  "her for an initial visit to establish care 6 months ago.  She had not been seen since 2018 . Echocardiogram now shows mild cardiomyopathy otherwise valves functioning well. She enjoys gardening and has stairs in her apartment.  She has not noted increased dyspnea  and denies chest pain.    Recent Echocardiogram Results: 2/21/24  The left ventricle is normal in size with normal left ventricular wall  thickness. Left ventricular function is decreased. The ejection fraction is  45-50% (mildly reduced).  The right ventricle is normal size with mildly decreased right ventricular  systolic function  There is a bi-leaflet (St. Polo) mechanical prosthesis. Normal prosthetic  mitral valve gradients with a mean gradient of 5 mmHg.  There is a bi-leaflet (St. Polo) aortic mechanical prosthesis. The peak and  mean prosthetic valve gradients are elevated at 26 and 19 mmHg, respectively).     Compared to the prior study of 2018 the mean mitral gradient has decreased.         Physical Examination  Review of Systems   Vitals: BP (!) 144/88 (BP Location: Right arm, Patient Position: Sitting, Cuff Size: Adult Large)   Pulse 58   Resp 16   Ht 1.676 m (5' 5.98\")   Wt 120.2 kg (265 lb)   BMI 42.80 kg/m    BMI= Body mass index is 42.8 kg/m .  Wt Readings from Last 3 Encounters:   07/26/24 120.2 kg (265 lb)   01/31/24 121.6 kg (268 lb)   01/29/24 123.4 kg (272 lb)       General Appearance:   no distress, normal body habitus   ENT/Mouth: membranes moist, no oral lesions or bleeding gums.      EYES:  no scleral icterus, normal conjunctivae       Chest/Lungs:   lungs are clear to auscultation   Cardiovascular:   Regular. Normal first and second heart sounds with mechanical S1 S2 no edema bilaterally        Extremities: no cyanosis or clubbing   Skin: no xanthelasma, warm.    Neurologic: normal  bilateral, no tremors     Psychiatric: alert and oriented x3, calm        Please refer above for cardiac ROS details.        Medical History "  Surgical History Family History Social History   Past Medical History:   Diagnosis Date    Aortic regurgitation     Aortic valve replaced     Degenerative disc disease     Dyslipidemia     Hashimoto's disease     History of Nany-en-Y gastric bypass 2004    Dr. Macario Highest weight 296#    Hypertension     Low back pain     Metabolic syndrome     Mitral valve replaced     Morbid obesity (H)     Morbid obesity with BMI of 40.0-44.9, adult (H)     Other and unspecified postsurgical nonabsorption     S/P gastric bypass     S/P MVR (mitral valve replacement)     Stroke (H)     Stroke (H)     from mass near mitral valve     Past Surgical History:   Procedure Laterality Date    AORTIC VALVE REPLACEMENT      OTHER SURGICAL HISTORY      uterine ablation    REDO STERNOTOMY REPLACE VALVE AORTIC  3/14/2013    Procedure: REDO STERNOTOMY REPLACE VALVE AORTIC;  Redo-Median  Sternotomy, Aortic root enlargement . aortic Valve  Replacement  on pump oxygenator;  Surgeon: Buck Paula MD;  Location: [facility] OR    REPLACE VALVE MITRAL      REVISION NANY-EN-Y      s/p gastric bypass[      s/p mitral valve replacement[      TUBAL LIGATION       Family History   Problem Relation Age of Onset    Cancer Mother     Heart Disease Father     Heart Disease Sister     Obesity Sister     Cancer Maternal Grandmother         Social History     Socioeconomic History    Marital status: Single     Spouse name: Not on file    Number of children: Not on file    Years of education: Not on file    Highest education level: Not on file   Occupational History    Not on file   Tobacco Use    Smoking status: Former     Current packs/day: 0.00     Types: Cigarettes     Quit date: 3/17/2010     Years since quittin.3    Smokeless tobacco: Never   Vaping Use    Vaping status: Never Used   Substance and Sexual Activity    Alcohol use: No    Drug use: No    Sexual activity: Never     Birth control/protection: Surgical   Other Topics Concern     Parent/sibling w/ CABG, MI or angioplasty before 65F 55M? Not Asked   Social History Narrative    Not on file     Social Determinants of Health     Financial Resource Strain: Low Risk  (10/30/2023)    [facility], [facility]    Financial Resource Strain     Difficulty of Paying Living Expenses: 3     Difficulty of Paying Living Expenses: Not on file   Food Insecurity: No Food Insecurity (10/30/2023)    [facility], [facility]    Food Insecurity     Worried About Running Out of Food in the Last Year: 1   Transportation Needs: No Transportation Needs (10/30/2023)    [facility], [facility]    Transportation Needs     Lack of Transportation (Medical): 1   Physical Activity: Not on file   Stress: Not on file (2/11/2021)   Social Connections: Socially Integrated (10/30/2023)    [facility] [employer] [facility] [facility], [facility]    Social Connections     Frequency of Communication with Friends and Family: 0   Interpersonal Safety: Low Risk  (4/13/2021)    Received from [employer], [address] [facility]    Intimate Partner Violence     Insults You: Not on file     Threatens You: Not on file     Screams at You: Not on file     Physically Hurt: Not on file     Intimate Partner Violence Score: Not on file   Housing Stability: Low Risk  (10/30/2023)    [facility], [facility] [employer] [facility]    Housing Stability     Unable to Pay for Housing in the Last Year: 1           Medications  Allergies   Current Outpatient Medications   Medication Sig Dispense Refill    B Complex-C (SUPER B COMPLEX) TABS Take 1 BID      BIOTIN PO Take 10,000 mcg by mouth daily      Calcium 600 MG  "tablet Take 1 tablet by mouth 2 times daily.      citalopram (CELEXA) 10 MG tablet Take 10 mg by mouth daily      clindamycin (CLEOCIN) 300 MG capsule TAKE 2 CAPSULES 1 HOUR PRIOR TO DENTAL APPOINTMENTS.      clobetasol (TEMOVATE) 0.05 % external ointment Apply topically as needed      gabapentin (NEURONTIN) 100 MG capsule TAKE  1 CAPSULE IN ADDITION  MG PRN.      gabapentin (NEURONTIN) 300 MG capsule Take 3 capsules by mouth at bedtime      levothyroxine (SYNTHROID/LEVOTHROID) 175 MCG tablet       losartan (COZAAR) 100 MG tablet Take 100 mg by mouth      metoprolol succinate ER (TOPROL XL) 25 MG 24 hr tablet Take 1 tablet (25 mg) by mouth daily 30 tablet 5    morphine (MS CONTIN) 15 MG CR tablet Take 15 mg by mouth every 12 hours      Multiple Vitamin (DAILY MULTIVITAMIN PO) Take 1 twice a day      topiramate (TOPAMAX) 50 MG tablet 1/2 tablet in the evening. You may increase to 1 full tablet after 1 week. 90 tablet 1    tretinoin (RETIN-A) 0.05 % external cream Apply topically at bedtime      vitamin D3 (CHOLECALCIFEROL) 125 MCG (5000 UT) tablet Take 5,000 Units by mouth      warfarin (COUMADIN) 2.5 MG tablet Take 2 tablets by mouth daily. 90 tablet 4       Allergies   Allergen Reactions    Tramadol Nausea     No relief and \"funny feeling\" after, not really effective      Trazodone Other (See Comments)     Made restless legs worse    Pcn [Penicillins] Hives          Lab Results    Chemistry/lipid CBC Cardiac Enzymes/BNP/TSH/INR   Recent Labs   Lab Test 09/12/23  0720   CHOL 177   HDL 63   LDL 91   TRIG 116     Recent Labs   Lab Test 09/12/23  0720 08/30/22  0734   LDL 91 104*     Recent Labs   Lab Test 01/15/24  1522      POTASSIUM 4.9   CHLORIDE 105   CO2 30*   *   BUN 10.8   CR 0.92   GFRESTIMATED 72   TAMIKA 9.5     Recent Labs   Lab Test 01/15/24  1522 09/20/23  1546 09/12/23  0720   CR 0.92 0.89 0.91     Recent Labs   Lab Test 09/12/23  0720 08/30/22  0734   A1C 5.5 5.5          Recent Labs " "  Lab Test 09/12/23  0720   WBC 6.1   HGB 12.8   HCT 39.1   MCV 86        Recent Labs   Lab Test 09/12/23  0720 08/30/22  0734   HGB 12.8 12.7    No results for input(s): \"TROPONINI\" in the last 22354 hours.  No results for input(s): \"BNP\", \"NTBNPI\", \"NTBNP\" in the last 54956 hours.  No results for input(s): \"TSH\" in the last 54226 hours.  Recent Labs   Lab Test 09/20/23  1546   INR 3.14*        Anuja Cagle MD                                      "

## 2024-07-29 ENCOUNTER — VIRTUAL VISIT (OUTPATIENT)
Dept: SURGERY | Facility: CLINIC | Age: 59
End: 2024-07-29
Payer: COMMERCIAL

## 2024-07-29 DIAGNOSIS — E66.01 MORBID OBESITY WITH BMI OF 40.0-44.9, ADULT (H): Primary | ICD-10-CM

## 2024-07-29 DIAGNOSIS — K90.9 INTESTINAL MALABSORPTION, UNSPECIFIED TYPE: ICD-10-CM

## 2024-07-29 DIAGNOSIS — Z98.84 S/P GASTRIC BYPASS: ICD-10-CM

## 2024-07-29 DIAGNOSIS — Z98.84 BARIATRIC SURGERY STATUS: ICD-10-CM

## 2024-07-29 PROCEDURE — 97803 MED NUTRITION INDIV SUBSEQ: CPT | Mod: 95 | Performed by: DIETITIAN, REGISTERED

## 2024-07-29 NOTE — LETTER
7/29/2024      Halina Ibrahim  [address] [facility] [address]      Dear Colleague,    Thank you for referring your patient, Halina Ibrahim, to the [facility] [place]. Please see a copy of my visit note below.    Halina Ibrahim is a 58 year old who is being evaluated via a billable video visit.      How would you like to obtain your AVS? MyChart  If the video visit is dropped, the invitation should be resent by: Text to cell phone: [phone]  Will anyone else be joining your video visit? No  {If patient encounters technical issues they should call [phone]      Post-op Surgical Weight Loss Diet Evaluation     Assessment:  Pt presents for 19 years post-op RD visit, s/p RNY on 9/2004. Today we reviewed current eating habits and level of physical activity, and instructed on the changes that are required for successful bariatric outcomes.     Weight loss medication: Wegovy - stopped taking the medication about one month    Patient Progress: Patient currently has COVID - noticing some taste changes. Patient stated that things have been okay with her nutrition since we last met. Patient has been more mindful of her protein intake with meals - aim for 15-20 grams per meal. Patient    Initial weight: 296 lbs  Current weight: 265 lbs  Weight change: 31 lbs, 10.5.% weight loss    BMI: 42.80    Patient Active Problem List   Diagnosis     Aortic valve disorder     S/P AVR     Restless leg syndrome     Morbid obesity (H)     History of cholecystectomy   Diabetes: no    Vitamins   Multi Vit with Iron: yes  Calcium Citrate: yes  B12: yes  D3: yes    Diet Recall/Time:   Breakfast: bagel OR high fiber/protein cereal OR breakfast burritos  Lunch: leftovers OR high protein/fiber cereal OR turkey and swiss sandwich OR meat cheese and crackers  Dinner: chicken nuggets (at home) OR judy nolan (turkey) breakfast bowls    Typical Snacks: fruit (cherries,  "nectarines)    Estimated protein intake: ~60 grams    Fluid-meal separation:  Fluids are not  30min before and 30 minutes after meals.    Fluid Intake  Water: 60+ ounces  Caffeine: pop  Alcohol: none  Carbonation: Pepsi Zero (trying to quick regular Pepsi) 1 can/day     Exercise:   No set routine at this time  Gardening, chores around the house      PES statement:      (NC-1.4) Altered GI Function related to Alteration in gastrointestinal tract structure and/or function/ Decreased functional length of the GI tract as evidenced by Weight loss of 10.5% initial body weight; Gastric bypass surgery    (NI-5.7.1) Inadequate protein intake related to Gastric bypass causing increased nutrient needs due to malabsorption/ Decreased ability to consume sufficient protein as evidenced by Estimated intake of protein insufficient to meet requirements    Intervention    Discussion  Recommended to consume 15-20gm protein at 3 meals daily, along with protein supplement/\"planned protein containing snack\" of 15-30gm protein, to reach goal of 60-80 gm protein daily.  Educated on post-op vitamin regimen: Multi Vit + iron 2x/day, calcium citrate 400-600 mg 2x/day, 1644-8210 mcg of Sublingual B-12 daily, and 5000 IU Vitamin D3 daily (MVI and calcium can be taken at the same time BID)  Reviewed lean protein sources  Bariatric Plate Method-  including lean/low fat protein at each meal, including a vegetable/fruit, and limiting carbohydrate intake to less than 25% of plate volume.     Instructions  Include 15-20gm protein at each meal, along with protein supplement/\"planned protein containing snack\" of 15-30gm protein, to reach goal of 60-80 gm protein daily.  Increase fluid intake to 64oz daily: choose plain or calorie/carbonation-free beverages.  Incorporate daily structured activity, 30-60 minutes most days of the week  Recommended pt to start taking: Multi Vit + iron 2x/day, calcium citrate 400-600 mg 2x/day, 3412-3272 mcg of " Sublingual B-12 daily, and 5000 IU Vitamin D3 daily. (MVI and calcium can be taken at the same time)  Read food labels more consistently: keeping total fat grams <10, total sugar grams <10, fiber >3gm per serving.  Increase vegetable/fruit intake, by having a vegetable or fruit with each meal daily.  Practice plate method: 1/2 plate lean/low fat protein source, vegetable/fruit, <25% of plate complex carbohydrates.  Separate fluids 30 minutes before/after meal times.  Practice eating off of smaller plates/bowls, chewing to applesauce consistency, taking 20-30 minutes to eat in a calm/relaxed environment without distractions of tv/email/cell phone.    Handouts provided:  No resources provided or requested    Assessment/Plan:    Pt to follow up for  20 year  post-op visit with bariatrician in 2 months and 6 month(s) with dietitian      Video-Visit Details    Type of service:  Video Visit    Video Start Time (time video started):  11:32 AM    Video End Time (time video stopped):  11:43 AM    Originating Location (pt. Location): Home      Distant Location (provider location):  Off-site    Mode of Communication:  Video Conference via [facility]    Physician has received verbal consent for a Video Visit from the patient? Yes    Wanda Temple RD             Again, thank you for allowing me to participate in the care of your patient.        Sincerely,        Wanda Temple RD

## 2024-08-01 ENCOUNTER — MYC MEDICAL ADVICE (OUTPATIENT)
Dept: CARDIOLOGY | Facility: CLINIC | Age: 59
End: 2024-08-01
Payer: COMMERCIAL

## 2024-08-04 DIAGNOSIS — Z95.2 S/P AVR (AORTIC VALVE REPLACEMENT): ICD-10-CM

## 2024-08-04 DIAGNOSIS — Z95.2 S/P MVR (MITRAL VALVE REPLACEMENT): ICD-10-CM

## 2024-08-04 DIAGNOSIS — R09.89 DECREASED CARDIAC FUNCTION: ICD-10-CM

## 2024-08-05 RX ORDER — METOPROLOL SUCCINATE 25 MG/1
25 TABLET, EXTENDED RELEASE ORAL DAILY
Qty: 90 TABLET | Refills: 2 | Status: SHIPPED | OUTPATIENT
Start: 2024-08-05

## 2024-08-15 NOTE — TELEPHONE ENCOUNTER
[place] Community Measures Blood Pressure guideline reviewed.  Patients recent blood pressure is outside of guideline parameters.      Called pt to review, pt saw MC message but forgot.  Left MC message on 8/1/24 with no response.     Patient instructed to check BP and respond to MyChart.  Pt understands.     Will await call back for further review. Thanks.     MINO Mendoza

## 2024-08-20 NOTE — TELEPHONE ENCOUNTER
[place] Community Measures Blood Pressure [place] reviewed.  Patients recent blood pressure is outside of guideline parameters.       Called pt to review, however, she is waiting for a new BP machine that should be here the end of the week.  Will call back next week.    /88  HR 58  on 7/26/24.      Thanks.      MINO Mendoza

## 2024-08-21 RX ORDER — NITROGLYCERIN 0.4 MG/1
0.4 TABLET SUBLINGUAL
Status: DISCONTINUED | OUTPATIENT
Start: 2024-08-21 | End: 2024-09-04 | Stop reason: HOSPADM

## 2024-08-21 RX ORDER — METOPROLOL TARTRATE 1 MG/ML
5-15 INJECTION, SOLUTION INTRAVENOUS
Status: DISCONTINUED | OUTPATIENT
Start: 2024-08-21 | End: 2024-09-04 | Stop reason: HOSPADM

## 2024-08-29 NOTE — PROGRESS NOTES
Virtual Visit Details    Type of service:  Video Visit     Originating Location (pt. Location): Home    Distant Location (provider location):  Off-site  Platform used for Video Visit: [employer]  Video start time: 10:19 am  Video end time: 10;50 am    Bariatric Care Clinic Follow Up Visit for Previous Bariatric Surgery   Date of visit: 9/9/2024  Physician: DURAN Glasgow MD, MD  Primary Care is Lu Carrillo  Halina Ibrahim   58 year old  female    Date of Surgery: 9/2004  Initial Weight: 296#    Today's Weight:   Wt Readings from Last 1 Encounters:   07/26/24 120.2 kg (265 lb)     There is no height or weight on file to calculate BMI.       Assessment and Plan   Assessment: Halina is a 58 year old year old female who is 20 years s/p RNY gastric bypass with Dr. Macario. They have had a durable weight loss of 29 lbs since surgery.  Overall compliance with the [facility] has been excellent.      Plan:    1. Postoperative malabsorption  Patient is taking all vitamins as directed.  Recent routine postoperative labs were reviewed. She was reminded to slow down, chew foods thoroughly, and to avoid liquids within 30 minutes of solids. Written information was given. She was congratulated on her success thus far.     2. Morbid obesity with BMI of 40.0-44.9, adult (H)  Patient was congratulated on her success thus far. Healthy habits to assist with further weight loss were discussed. She will try to add some you tube exercise videos. She will restart the wegovy. We discussed the patient's co-morbid conditions including CAD and migraine headaches. These likely will improve with healthy habits and weight loss.     3. History of migraine headaches  This may improve with healthy habits and weight loss.      Follow up in 3-4 months with myself    Total time spent on the date of this encounter doing: chart review, review of test results, patient visit, physical exam, education, counseling, developing  "plan of care and documenting = 49 minutes.      Bariatric Surgery Review   Interim History/LifeChanges: Patient was on a weekly GLP1 and she stopped it because she got sick of doing injections and she felt guilty that she was taking medication away from diabetics. She did get appetite reduction to the point that she was worried she wasn't eating enough.      Patient Concerns: some weight regain    GERD not discussed    Medication changes: she stopped wegovy in June    Vitamin Intake:   Multivitamin   2 per day plus iron tablet   Vitamin D  5000 international unit(s)- started 3 months   Calcium  Citrate plus D   Vit. B-12    Super B complex     Habits:            Alcohol Intake  Not discussed   NSAID Use  none   Caffeine Use  Pepsi one once a day   Exercise Some arm exercises, ADLs going up and down stairs, goes outside with dogs (hip and knee issues) She is on morphine for restless legs   CPAP Use:  Not discussed   Birth Control  menopause   Tobacco Use     no       No Personal hx of pancreatitis  No personal or family history of thyroid cancer  No personal or family history of MEN2                               LABS:  have been ordered , reviewed      LABS:  Lab Results   Component Value Date    WBC 6.1 09/12/2023    HGB 12.8 09/12/2023    HCT 39.1 09/12/2023    MCV 86 09/12/2023     09/12/2023      No components found for: \"WNGFSVKT26JD\" Lab Results   Component Value Date    HGBA1C 5.6 09/06/2019      Lab Results   Component Value Date    CHOL 177 09/12/2023    No components found for: \"PTH\"      No components found for: \"FERRITIN\"   Lab Results   Component Value Date    HDL 63 09/12/2023      No components found for: \"OCECGBCT68\" No components found for: \"02927\"   No components found for: \"LDLCALC\" No results found for: \"TSH\" No components found for: \"FOLATE\"   Lab Results   Component Value Date    TRIG 116 09/12/2023    Lab Results   Component Value Date    ALT 27 01/15/2024    AST 33 01/15/2024    GGT " "651 (H) 09/12/2023    ALKPHOS 102 01/15/2024    No results found for: \"TESTOSTERONE\"     No results found for: \"CHOLHDL\" No components found for: \"7597\"   @resusfast(vitamin a: 1)@             Patient Profile   Social History     Social History Narrative    Not on file        Past Medical History   Past Medical History:   Diagnosis Date    Aortic regurgitation     Aortic valve replaced     Degenerative disc disease     Dyslipidemia     Hashimoto's disease     History of Sabrina-en-Y gastric bypass 9/14/2004    Dr. Macario Highest weight 296#    Hypertension     Low back pain     Metabolic syndrome     Mitral valve replaced     Morbid obesity (H)     Morbid obesity with BMI of 40.0-44.9, adult (H)     Other and unspecified postsurgical nonabsorption     S/P gastric bypass     S/P MVR (mitral valve replacement)     Stroke (H)     Stroke (H)     from mass near mitral valve     Patient Active Problem List   Diagnosis    Aortic valve disorder    S/P AVR    Restless leg syndrome    Morbid obesity (H)    History of cholecystectomy     Past Surgical History  She has a past surgical history that includes s/p mitral valve replacement[; s/p gastric bypass[; Redo sternotomy replace valve aortic (3/14/2013); tubal ligation; other surgical history; Revision Sabrina-En-Y; aortic valve replacement; and Replace valve mitral.     Examination   There were no vitals taken for this visit.    Wt Readings from Last 4 Encounters:   07/26/24 120.2 kg (265 lb)   01/31/24 121.6 kg (268 lb)   01/29/24 123.4 kg (272 lb)   10/10/23 122 kg (269 lb)      BP Readings from Last 3 Encounters:   07/26/24 (!) 144/88   01/31/24 134/76   11/26/19 136/62      GENERAL: alert and no distress  EYES: Eyes grossly normal to inspection.  No discharge or erythema, or obvious scleral/conjunctival abnormalities.  RESP: No audible wheeze, cough, or visible cyanosis.    SKIN: Visible skin clear. No significant rash, abnormal pigmentation or lesions.  NEURO: Cranial nerves " grossly intact.  Mentation and speech appropriate for age.  PSYCH: Appropriate affect, tone, and pace of words        Counseling:   We reviewed the important post op bariatric recommendations:  -eating 3 meals daily  -eating protein first, getting >60gm protein daily  -eating slowly, chewing food well  -avoiding/limiting calorie containing beverages  -drinking water 15-30 minutes before or after meals  -choosing wheat, not white with breads, crackers, pastas, madelyn, bagels, tortillas, rice  -limiting restaurant or cafeteria eating to twice a week or less    We discussed the importance of restorative sleep and stress management in maintaining a healthy weight.  We discussed the National Weight Control Registry healthy weight maintenance strategies and ways to optimize metabolism.  We discussed the importance of physical activity including cardiovascular and strength training in maintaining a healthier weight.  We discussed the importance of life-long vitamin supplementation and life-long  follow-up.    Halina was reminded that, to avoid marginal ulcers she should avoid tobacco at all, alcohol in excess, caffeine in excess, and NSAIDS (unless indicated for cardioprotection or othewise and opposed by a PPI).    DURAN Glasgow MD  [facility]  8/29/2024  10:15 AM            No images are attached to the encounter.

## 2024-09-03 ENCOUNTER — HOSPITAL ENCOUNTER (OUTPATIENT)
Dept: CT IMAGING | Facility: CLINIC | Age: 59
Discharge: HOME | End: 2024-09-03
Attending: INTERNAL MEDICINE | Admitting: INTERNAL MEDICINE
Payer: COMMERCIAL

## 2024-09-03 VITALS
HEIGHT: 66 IN | WEIGHT: 267 LBS | BODY MASS INDEX: 42.91 KG/M2 | SYSTOLIC BLOOD PRESSURE: 150 MMHG | DIASTOLIC BLOOD PRESSURE: 7 MMHG

## 2024-09-03 DIAGNOSIS — R06.09 DYSPNEA ON EXERTION: ICD-10-CM

## 2024-09-03 DIAGNOSIS — I25.5 ISCHEMIC CARDIOMYOPATHY: ICD-10-CM

## 2024-09-03 LAB
BSA FOR ECHO PROCEDURE: 0 M2
CREAT BLD-MCNC: 1.1 MG/DL (ref 0.6–1.1)
EGFRCR SERPLBLD CKD-EPI 2021: 58 ML/MIN/1.73M2

## 2024-09-03 PROCEDURE — 82565 ASSAY OF CREATININE: CPT

## 2024-09-03 PROCEDURE — 250N000013 HC RX MED GY IP 250 OP 250 PS 637: Performed by: INTERNAL MEDICINE

## 2024-09-03 PROCEDURE — 75574 CT ANGIO HRT W/3D IMAGE: CPT | Mod: 26 | Performed by: INTERNAL MEDICINE

## 2024-09-03 PROCEDURE — 75574 CT ANGIO HRT W/3D IMAGE: CPT

## 2024-09-03 PROCEDURE — 250N000011 HC RX IP 250 OP 636: Performed by: INTERNAL MEDICINE

## 2024-09-03 RX ORDER — IOPAMIDOL 755 MG/ML
100 INJECTION, SOLUTION INTRAVASCULAR ONCE
Status: COMPLETED | OUTPATIENT
Start: 2024-09-03 | End: 2024-09-03

## 2024-09-03 RX ADMIN — IOPAMIDOL 75 ML: 755 INJECTION, SOLUTION INTRAVENOUS at 09:11

## 2024-09-03 RX ADMIN — NITROGLYCERIN 0.4 MG: 0.4 TABLET SUBLINGUAL at 09:00

## 2024-09-06 ENCOUNTER — LAB (OUTPATIENT)
Dept: LAB | Facility: CLINIC | Age: 59
End: 2024-09-06
Payer: COMMERCIAL

## 2024-09-06 DIAGNOSIS — Z98.84 S/P GASTRIC BYPASS: ICD-10-CM

## 2024-09-06 DIAGNOSIS — K90.9 INTESTINAL MALABSORPTION, UNSPECIFIED TYPE: ICD-10-CM

## 2024-09-06 LAB
ALBUMIN SERPL BCG-MCNC: 3.8 G/DL (ref 3.5–5.2)
ALP SERPL-CCNC: 99 U/L (ref 40–150)
ALT SERPL W P-5'-P-CCNC: 26 U/L (ref 0–50)
ANION GAP SERPL CALCULATED.3IONS-SCNC: 10 MMOL/L (ref 7–15)
AST SERPL W P-5'-P-CCNC: 41 U/L (ref 0–45)
BILIRUB SERPL-MCNC: 0.3 MG/DL
BUN SERPL-MCNC: 12.9 MG/DL (ref 6–20)
CALCIUM SERPL-MCNC: 8.7 MG/DL (ref 8.8–10.4)
CHLORIDE SERPL-SCNC: 107 MMOL/L (ref 98–107)
CHOLEST SERPL-MCNC: 163 MG/DL
CREAT SERPL-MCNC: 1.04 MG/DL (ref 0.51–0.95)
EGFRCR SERPLBLD CKD-EPI 2021: 62 ML/MIN/1.73M2
ERYTHROCYTE [DISTWIDTH] IN BLOOD BY AUTOMATED COUNT: 14.1 % (ref 10–15)
FASTING STATUS PATIENT QL REPORTED: ABNORMAL
FASTING STATUS PATIENT QL REPORTED: ABNORMAL
FERRITIN SERPL-MCNC: 146 NG/ML (ref 11–328)
FOLATE SERPL-MCNC: 37.5 NG/ML (ref 4.6–34.8)
GLUCOSE SERPL-MCNC: 99 MG/DL (ref 70–99)
HBA1C MFR BLD: 5.5 % (ref 0–5.6)
HCO3 SERPL-SCNC: 25 MMOL/L (ref 22–29)
HCT VFR BLD AUTO: 38.3 % (ref 35–47)
HDLC SERPL-MCNC: 46 MG/DL
HGB BLD-MCNC: 12.3 G/DL (ref 11.7–15.7)
LDLC SERPL CALC-MCNC: 88 MG/DL
MCH RBC QN AUTO: 27.4 PG (ref 26.5–33)
MCHC RBC AUTO-ENTMCNC: 32.1 G/DL (ref 31.5–36.5)
MCV RBC AUTO: 85 FL (ref 78–100)
NONHDLC SERPL-MCNC: 117 MG/DL
PLATELET # BLD AUTO: 242 10E3/UL (ref 150–450)
POTASSIUM SERPL-SCNC: 4.4 MMOL/L (ref 3.4–5.3)
PROT SERPL-MCNC: 6.7 G/DL (ref 6.4–8.3)
PTH-INTACT SERPL-MCNC: 63 PG/ML (ref 15–65)
RBC # BLD AUTO: 4.49 10E6/UL (ref 3.8–5.2)
SODIUM SERPL-SCNC: 142 MMOL/L (ref 135–145)
TRIGL SERPL-MCNC: 144 MG/DL
VIT B12 SERPL-MCNC: 1007 PG/ML (ref 232–1245)
VIT D+METAB SERPL-MCNC: 67 NG/ML (ref 20–50)
WBC # BLD AUTO: 8.3 10E3/UL (ref 4–11)

## 2024-09-06 PROCEDURE — 80061 LIPID PANEL: CPT

## 2024-09-06 PROCEDURE — 82728 ASSAY OF FERRITIN: CPT

## 2024-09-06 PROCEDURE — 82306 VITAMIN D 25 HYDROXY: CPT

## 2024-09-06 PROCEDURE — 84590 ASSAY OF VITAMIN A: CPT | Mod: 90

## 2024-09-06 PROCEDURE — 83036 HEMOGLOBIN GLYCOSYLATED A1C: CPT

## 2024-09-06 PROCEDURE — 84630 ASSAY OF ZINC: CPT | Mod: 90

## 2024-09-06 PROCEDURE — 85027 COMPLETE CBC AUTOMATED: CPT

## 2024-09-06 PROCEDURE — 80053 COMPREHEN METABOLIC PANEL: CPT

## 2024-09-06 PROCEDURE — 99000 SPECIMEN HANDLING OFFICE-LAB: CPT

## 2024-09-06 PROCEDURE — 36415 COLL VENOUS BLD VENIPUNCTURE: CPT

## 2024-09-06 PROCEDURE — 84425 ASSAY OF VITAMIN B-1: CPT | Mod: 90

## 2024-09-06 PROCEDURE — 83970 ASSAY OF PARATHORMONE: CPT

## 2024-09-06 PROCEDURE — 82607 VITAMIN B-12: CPT

## 2024-09-06 PROCEDURE — 82746 ASSAY OF FOLIC ACID SERUM: CPT

## 2024-09-08 LAB — ZINC SERPL-MCNC: 83.6 UG/DL

## 2024-09-09 ENCOUNTER — VIRTUAL VISIT (OUTPATIENT)
Dept: SURGERY | Facility: CLINIC | Age: 59
End: 2024-09-09
Payer: COMMERCIAL

## 2024-09-09 DIAGNOSIS — K91.2 POSTOPERATIVE MALABSORPTION: Primary | ICD-10-CM

## 2024-09-09 DIAGNOSIS — E66.01 MORBID OBESITY WITH BMI OF 40.0-44.9, ADULT (H): ICD-10-CM

## 2024-09-09 DIAGNOSIS — Z86.69 HISTORY OF MIGRAINE HEADACHES: ICD-10-CM

## 2024-09-09 LAB
ANNOTATION COMMENT IMP: NORMAL
RETINYL PALMITATE SERPL-MCNC: <0.02 MG/L
VIT A SERPL-MCNC: 0.45 MG/L

## 2024-09-09 PROCEDURE — 99215 OFFICE O/P EST HI 40 MIN: CPT | Mod: 95 | Performed by: FAMILY MEDICINE

## 2024-09-09 RX ORDER — SEMAGLUTIDE 0.25 MG/.5ML
0.25 INJECTION, SOLUTION SUBCUTANEOUS WEEKLY
Qty: 2 ML | Refills: 0 | Status: SHIPPED | OUTPATIENT
Start: 2024-09-09 | End: 2024-10-07

## 2024-09-09 RX ORDER — SEMAGLUTIDE 0.5 MG/.5ML
0.5 INJECTION, SOLUTION SUBCUTANEOUS WEEKLY
Qty: 2 ML | Refills: 0 | Status: SHIPPED | OUTPATIENT
Start: 2024-09-09 | End: 2024-10-07

## 2024-09-09 RX ORDER — SEMAGLUTIDE 1 MG/.5ML
1 INJECTION, SOLUTION SUBCUTANEOUS WEEKLY
Qty: 2 ML | Refills: 0 | Status: SHIPPED | OUTPATIENT
Start: 2024-09-09

## 2024-09-09 ASSESSMENT — PAIN SCALES - GENERAL: PAINLEVEL: NO PAIN (0)

## 2024-09-09 NOTE — NURSING NOTE
Is the patient currently in the state of [place]? YES    Location: home    Visit mode:VIDEO    If the visit is dropped, the patient can be reconnected by: VIDEO VISIT: Text to cell phone:   Telephone Information:   Mobile [phone]    and VIDEO VISIT: Send to e-mail at: [email or web address]    Will anyone else be joining the visit? NO  (If patient encounters technical issues they should call [phone]    How would you like to obtain your AVS? MyChart    Are changes needed to the allergy or medication list? N/A    Are refills needed on medications prescribed by this physician? NO    Reason for visit: RECHGEORGINA [employer] VVF    QNR Status: complete

## 2024-09-09 NOTE — PATIENT INSTRUCTIONS
Here are the [employer] exercise sites:    Yoga-https://www.[employer].com/user/yogawithadriene    Body strength activities, dance, high intensity interval training- https://www.[employer].com/user/popsugartvfit    Strength training- https://www.[employer].com/user/KozakSportsPerform    Walking- https://www.[employer].com/user/walkathomemedia    Sit and Be Fit- https://www.[employer]/channel/UCLgvL3aGzMByecNYtMcyK_g    Low impact cardio- Ronit Fung- https://www.[employer]/channel/KAgvYAfAhiaIvdeY7ynvezrH    Cardio Yeceniaangela Paniagua- https://www.[employer]/channel/ZJNsUtri7MXpUZQDU4uSfIIa    30 Min low impact cardio- https://www.[employer].com/watch?v=gC_L9qAHVJ8    Body Project- https://www.[employer]/channel/IPDlu9L67-YoGxLDjYwqD4SK               [facility] Bariatric Care  Nutritional Guidelines  Gastric Bypass 18 Months Post Op and Beyond    General Guidelines and Helpful Hints:  Eat 3 meals per day + protein supplement(s). No snacks between meals.  Do not skip meals.  This can cause overeating at the next meal and will prevent adequate protein and nutritional intake.  Aim for 60-80 grams of protein per day.  Always eat your protein first. This assists with optimal nutrition and helps you stay full longer.  Depending on your portion size, you may need to drink approved protein supplement between meals to achieve protein goals. Follow recommendations of your Dietitian.   Eat your protein first, and then follow with fiber.   It is not necessary to count your fiber, but 15-20 grams per day is recommended.    Add fiber by including fruits, vegetables, whole grains, and beans.   Portions should remain about 1 cup per meal. Use measuring cups to be accurate.  Continue to use saucer/salad plates, infant/toddler silverware to keep portion sizes small and take small bites.  Eat S-L-O-W-L-Y to make each meal last 20-30 minutes. Always stop eating when satisfied.  Continue to use caution with foods containing skins, peels or  membranes. Chew well!  Aim for 64 oz. of calorie-free fluids daily.  Continue to avoid caffeine and carbonation. If you choose to drink alcohol, do so in moderation.   Remember to avoid drinking during meals, 15-30 minutes before and 30 minutes after.  Exercise is brady for continued weight loss and weight maintenance. Aim for 30-60 minutes of physical activity most days of the week. Include cardiovascular and strength training.  If having trouble tolerating meat, try using a crock-pot, tinfoil tent, steamer or other moist cooking method to create tender meats. Add broth or low-fat gravy to help meat stay moist.   Avoid high sugar and high fat foods to prevent dumping syndrome.  Check nutrition labels for less than 10 grams of sugar and less than 10 grams of fat per serving.  Continue Taking Vitamins/Minerals:  9087-9840 mcg of Sublingual B-12 daily  1 Multivitamin with Iron twice daily (chewable or swallow tabs)  500-600 mg Calcium Citrate twice daily (chewable or swallow tabs)  5000 IU Vitamin D3 daily  An additional iron tablet for menstruating females  You may also need an additional thiamine or B Complex tablet    Sample Grocery List    Protein:  Fat free Greek or light yogurt (less than 10 grams sugar)  Fat free or low-fat cottage cheese  String cheese or reduced fat cheese slices  Tuna, salmon, crab, egg, or chicken salad made with light or fat free mayonnaise  Egg or Egg Substitute  Lean/extra lean turkey, beef, bison, venison (ground, sirloin, round, flank)  Pork loin or tenderloin (grilled, baked, broiled)  Fish such as salmon, tuna, trout, tilapia, etc. (grilled, baked, broiled)  Tender cuts of lean (skinless) turkey or chicken  Lean deli meats: turkey, lean ham, chicken, lean roast beef  Beans such as kidney, garbanzo, black, clinton, or low-fat/fat free refried beans  Peanut butter (natural preferred). Limit to 1 Tbsp. per day.  Low-fat meatloaf (made with lean ground beef or turkey)  Sloppy [place] made with  low-sugar ketchup and lean ground beef or turkey  Soy or vegetable protein (i.e. vegan crumbles, soy/veggie burger, tofu)  Hummus    Vegetables:  Fresh: cooked or raw (as tolerated)  Frozen vegetables  Canned vegetables (low sodium or no salt added, rinse before cooking/eating)  (Ok to have skins/peels/membranes/seeds - just chew well)    Fruits:  Fresh fruit  Frozen fruit (no sugar added)  Canned fruit (packed in its own juice, NOT syrup)  (Ok to have skins/peels/membranes/seeds - just chew well)    Starch:  Unsweetened whole-grain hot cereal (or high fiber cold cereal, dry)  Toasted whole wheat bread or Ashby Thins  Whole grain crackers  Baked /boiled/mashed potato/sweet potato  Cooked whole grain pasta, brown rice, or other cooked whole grains  Starchy vegetables: corn, peas, winter squash    Protein Supplement:   Ready to drink protein shake with:  15-30 grams protein per serving  Less than 10 grams total carbohydrate per serving   Protein powder mixed with:   Skim or 1% milk  Low fat or fat free Lactaid milk, plain or no sugar added soymilk  Water     Fats: (use in moderation)  1 teaspoon of soft tub margarine  1 teaspoon olive oil, canola oil, or peanut oil  1 tablespoon of low-fat deluca or salad dressing     Sample Menu for 18+ months after Gastric Bypass    You do NOT need to eat/drink the full portion sizes listed below  Always stop when you are satisfied    Breakfast   cup 1% cottage cheese     cup mixed berries   Lunch 2 oz lean roast beef on   [place] Thin with 1 tsp. light deluca    small tomato, chopped, mixed with 1 tsp. light vinaigrette dressing   Supplement Approved protein supplement (if needed between meals)   Dinner 2 oz grilled salmon    cup salad greens with 1 tsp. light salad dressing and 1 tsp. ground flax seed    cup quinoa or brown rice     Breakfast   cup egg substitute with   cup sautéed chopped vegetables  2 light [place] Krisp crackers   Lunch Tuna Melt:   cup tuna mixed with 1 tsp. light  deluca over   [place] Thin. Top with 2-3 slices cucumber and 1 oz slice of low fat cheese   Supplement 1 cup skim milk (if needed between meals)   Dinner 3 oz  grilled, broiled, or baked seasoned skinless chicken breast    cup asparagus     Breakfast   cup plain oatmeal made with skim or 1% milk with 1 Tbsp. flavored/unflavored protein powder added  1 mozzarella string cheese   Lunch 2 oz deli turkey breast  1/3 cup salad with 1 tsp. light salad dressing, 1/8 of a whole avocado and 1 Tbsp. sunflower seeds   Dinner 3 oz. pork loin made in a crock pot, seasoned with a spice rub    cup cooked carrots   Supplement Approved protein supplement (if needed between meals)     Breakfast 1 cup breakfast casserole made with egg substitute, turkey sausage,  and steamed, chopped bell peppers   Supplement  1 cup light Greek yogurt (if needed between meals)   Lunch 2 oz. teriyaki turkey    cup mashed sweet potato with 1-2 spritzes of spray butter (like [place])    cup fresh pineapple   Dinner 3 oz low fat meatloaf    cup roasted garlic zucchini     Breakfast   cup leftover breakfast casserole    cup no sugar added applesauce with 1 Tbsp. unflavored protein powder and a sprinkle of cinnamon    Lunch 3 oz shrimp with 1-2 Tbsp. low-sugar cocktail sauce for dipping    c. whole wheat pasta drizzled with   tsp. olive oil   Supplement 1 cup skim/1% milk with scoop of protein powder (if needed between meals)   Dinner Grilled, seasoned kebob with 2 oz lean beef and   cup vegetables     Breakfast Breakfast pizza:   [place] Thin spread with 1 Tbsp. low sugar spaghetti sauce,   cup shredded low fat cheese, melted and 1 slice of [place]     cup fresh fruit mixed with chopped almonds   Lunch   cup black bean soup  4-5 whole grain crackers   Dinner 3 oz  tilapia with lemon pepper seasoning    cup stewed tomatoes   Supplement 1 string cheese (if needed between meals)     Breakfast 2 hard boiled eggs (discard 1 egg yolk)    whole wheat  English Muffin with 1 tsp. low sugar jelly   Lunch   cup leftover black bean soup topped with 1-2 Tbsp. low fat cheese  2-3 light [place] Krisp crackers   Supplement Approved protein supplement (if needed between meals)   Dinner 3 oz sirloin steak    cup steamed broccoli          Your provider prescribed wegovy weekly injections to help assist you with your weight loss efforts. Please follow the instructions on your prescription as the dose will be gradually tapered up. Do not use if you have a personal or family history of medullary thyroid carcinoma or a personal history of Multiple Endocrine Neoplasia syndrome type 2. Pancreatitis has occurred in some clinical trials. If you develop abdominal pain and fever please stop this medication and call your provider.     1.  Start Wegovy (semaglutide) 0.25 mg once weekly for 4 weeks, then if tolerating increase to 0.5 mg weekly for 4 weeks, then if tolerating increase to 1 mg weekly for 4 weeks, then if tolerating increase to 1.7 mg weekly for 4 weeks, then if tolerating increase to 2.4 mg weekly thereafter.   -Each Wegovy pen is a different color to help identify the different dose strengths   -Each Wegovy pen is a once weekly single-dose prefilled pen with a pen injector already built within the pen. Discard the Wegovy pen after use in sharps container.     If you are struggling with side effects you can taper the dose up more slowly.    2. Storage: make sure that when you get the prescription that you store the prescription in the refrigerator until it is time to use the Wegovy pen.  Once it is time to use the Wegovy pen, you can keep the pen at room temperature and it is good for up to 28 days at room temperature. D    3.  Potential common side effects: nausea, headache, diarrhea, stomach upset.  If these become unmanageable or concerning symptoms, please make sure to call or mychart.        Using Your Wegovy Pen  Medicine (semaglutide)    Storing your pens  Store your  pens in the refrigerator until you are ready to use them. Don't let them freeze.  Your pen may be stored at room temperature for 28 days or fewer. Just make sure the temperature doesn't get higher than 86 or lower than 46 degrees Fahrenheit.   Protect the pens from light.  Never use any pens that have .    Check your pen before use:  The liquid in the pen window should be clear and colorless. Bubbles are okay to see.   Do not use the pen if you can see the window contains any specks or it is cloudy or has changed color.  Make sure you have the medication and dose your health care provider prescribed.    Getting ready to inject:   1. Wash and dry your hands well.  2. Make sure the counter you use to place your supplies on is clean.  3. Make sure your injection time will not be interrupted by children or pets.  4. Have alcohol wipes or alcohol and cotton balls available to clean the injection site.   5. Choose your injection site. It can be on your stomach, back of upper arms, or upper legs. Remember to change your injection site each time you inject. Try to be at least 1 inch away from the previous one. Stay at least 1 inch away from your belly button.     Inject your dose:  1. Pull off the grey cap off the pen. Throw it in the trash. Do not put the cap back on the pen.   2. Clean the injection site with alcohol.   3. Push the grey part of the pen firmly against your skin. This will start the injection.  4. When the pen is injecting, you will hear 2 clicks. The first click tells you the injection has started. The second one tells you the injection is continuing.   5. The injection is done when the yellow bar in the glass window at the bottom of the pen has stopped moving.   6. This injection is given 1 day a week. The pens come in  5 doses ranging from 0.25 mg to 2.4 mg. Each dose comes in a different color pen.  7. If you miss a dose, take is as soon as you remember. Do not take a missed dose, if it is within  2 days of the next dose.    8. Your injection may be best tolerated if given at night.     Disposing of your pens:  Dispose of your pens in a puncture-resistant container (hard plastic bottle) or Sharps container.  Check with the county you live in on how to dispose of the container.  Do not recycle the container with used pens.     Of note, you may not be able to  your medication right away. It may require a prior authorization from your insurance company. This may take a week or more.

## 2024-09-09 NOTE — LETTER
9/9/2024      Halina Ibrahim  [address] [address]      Dear Colleague,    Thank you for referring your patient, Halina Ibrahim, to the [facility] SURGERY [facility] AND BARIATRICS CARE [place]. Please see a copy of my visit note below.    Virtual Visit Details    Type of service:  Video Visit     Originating Location (pt. Location): Home    Distant Location (provider location):  Off-site  Platform used for Video Visit: Trusteer  Video start time: 10:19 am  Video end time: 10;50 am    Bariatric Care Clinic Follow Up Visit for Previous Bariatric Surgery   Date of visit: 9/9/2024  Physician: DURAN Glasgow MD, MD  Primary Care is [facility] Carrillo  Halina Ibrahim   58 year old  female    Date of Surgery: 9/2004  Initial Weight: 296#    Today's Weight:   Wt Readings from Last 1 Encounters:   07/26/24 120.2 kg (265 lb)     There is no height or weight on file to calculate BMI.       Assessment and Plan   Assessment: Halina is a 58 year old year old female who is 20 years s/p RNY gastric bypass with Dr. Macario. They have had a durable weight loss of 29 lbs since surgery.  Overall compliance with the [facility]ric Surgery Program has been excellent.      Plan:    1. Postoperative malabsorption  Patient is taking all vitamins as directed.  Recent routine postoperative labs were reviewed. She was reminded to slow down, chew foods thoroughly, and to avoid liquids within 30 minutes of solids. Written information was given. She was congratulated on her success thus far.     2. Morbid obesity with BMI of 40.0-44.9, adult (H)  Patient was congratulated on her success thus far. Healthy habits to assist with further weight loss were discussed. She will try to add some you tube exercise videos. She will restart the wegovy. We discussed the patient's co-morbid conditions including CAD and migraine headaches. These likely will improve with healthy habits and weight loss.     3. History of migraine  "headaches  This may improve with healthy habits and weight loss.      Follow up in 3-4 months with myself    Total time spent on the date of this encounter doing: chart review, review of test results, patient visit, physical exam, education, counseling, developing plan of care and documenting = 49 minutes.      Bariatric Surgery Review   Interim History/LifeChanges: Patient was on a weekly GLP1 and she stopped it because she got sick of doing injections and she felt guilty that she was taking medication away from diabetics. She did get appetite reduction to the point that she was worried she wasn't eating enough.      Patient Concerns: some weight regain    GERD not discussed    Medication changes: she stopped wegovy in June    Vitamin Intake:   Multivitamin   2 per day plus iron tablet   Vitamin D  5000 international unit(s)- started 3 months   Calcium  Citrate plus D   Vit. B-12    Super B complex     Habits:            Alcohol Intake  Not discussed   NSAID Use  none   Caffeine Use  Pepsi one once a day   Exercise Some arm exercises, ADLs going up and down stairs, goes outside with dogs (hip and knee issues) She is on morphine for restless legs   CPAP Use:  Not discussed   Birth Control  menopause   Tobacco Use     no       No Personal hx of pancreatitis  No personal or family history of thyroid cancer  No personal or family history of MEN2                               LABS:  have been ordered , reviewed      LABS:  Lab Results   Component Value Date    WBC 6.1 09/12/2023    HGB 12.8 09/12/2023    HCT 39.1 09/12/2023    MCV 86 09/12/2023     09/12/2023      No components found for: \"XGVXXEGF30ID\" Lab Results   Component Value Date    HGBA1C 5.6 09/06/2019      Lab Results   Component Value Date    CHOL 177 09/12/2023    No components found for: \"PTH\"      No components found for: \"FERRITIN\"   Lab Results   Component Value Date    HDL 63 09/12/2023      No components found for: \"SXETXWOQ23\" No components " "found for: \"91867\"   No components found for: \"LDLCALC\" No results found for: \"TSH\" No components found for: \"FOLATE\"   Lab Results   Component Value Date    TRIG 116 09/12/2023    Lab Results   Component Value Date    ALT 27 01/15/2024    AST 33 01/15/2024     (H) 09/12/2023    ALKPHOS 102 01/15/2024    No results found for: \"TESTOSTERONE\"     No results found for: \"CHOLHDL\" No components found for: \"7597\"   @resusfast(vitamin a: 1)@             Patient Profile   Social History     Social History Narrative     Not on file        Past Medical History   Past Medical History:   Diagnosis Date     Aortic regurgitation      Aortic valve replaced      Degenerative disc disease      Dyslipidemia      Hashimoto's disease      History of Sabrina-en-Y gastric bypass 9/14/2004    Dr. Macario Highest weight 296#     Hypertension      Low back pain      Metabolic syndrome      Mitral valve replaced      Morbid obesity (H)      Morbid obesity with BMI of 40.0-44.9, adult (H)      Other and unspecified postsurgical nonabsorption      S/P gastric bypass      S/P MVR (mitral valve replacement)      Stroke (H)      Stroke (H)     from mass near mitral valve     Patient Active Problem List   Diagnosis     Aortic valve disorder     S/P AVR     Restless leg syndrome     Morbid obesity (H)     History of cholecystectomy     Past Surgical History  She has a past surgical history that includes s/p mitral valve replacement[; s/p gastric bypass[; Redo sternotomy replace valve aortic (3/14/2013); tubal ligation; other surgical history; Revision Sabrina-En-Y; aortic valve replacement; and Replace valve mitral.     Examination   There were no vitals taken for this visit.    Wt Readings from Last 4 Encounters:   07/26/24 120.2 kg (265 lb)   01/31/24 121.6 kg (268 lb)   01/29/24 123.4 kg (272 lb)   10/10/23 122 kg (269 lb)      BP Readings from Last 3 Encounters:   07/26/24 (!) 144/88   01/31/24 134/76   11/26/19 136/62      GENERAL: alert and no " distress  EYES: Eyes grossly normal to inspection.  No discharge or erythema, or obvious scleral/conjunctival abnormalities.  RESP: No audible wheeze, cough, or visible cyanosis.    SKIN: Visible skin clear. No significant rash, abnormal pigmentation or lesions.  NEURO: Cranial nerves grossly intact.  Mentation and speech appropriate for age.  PSYCH: Appropriate affect, tone, and pace of words        Counseling:   We reviewed the important post op bariatric recommendations:  -eating 3 meals daily  -eating protein first, getting >60gm protein daily  -eating slowly, chewing food well  -avoiding/limiting calorie containing beverages  -drinking water 15-30 minutes before or after meals  -choosing wheat, not white with breads, crackers, pastas, madelyn, bagels, tortillas, rice  -limiting restaurant or cafeteria eating to twice a week or less    We discussed the importance of restorative sleep and stress management in maintaining a healthy weight.  We discussed the National Weight Control Registry healthy weight maintenance strategies and ways to optimize metabolism.  We discussed the importance of physical activity including cardiovascular and strength training in maintaining a healthier weight.  We discussed the importance of life-long vitamin supplementation and life-long  follow-up.    Halina was reminded that, to avoid marginal ulcers she should avoid tobacco at all, alcohol in excess, caffeine in excess, and NSAIDS (unless indicated for cardioprotection or othewise and opposed by a PPI).    DURAN Glasgow MD  [facility] Bariatric clinic  8/29/2024  10:15 AM            No images are attached to the encounter.      Again, thank you for allowing me to participate in the care of your patient.        Sincerely,        DURAN Glasgow MD

## 2024-09-10 LAB — VIT B1 PYROPHOSHATE BLD-SCNC: 229 NMOL/L

## 2024-09-27 ENCOUNTER — HOSPITAL ENCOUNTER (OUTPATIENT)
Dept: GENERAL RADIOLOGY | Facility: HOSPITAL | Age: 59
Discharge: HOME | End: 2024-09-27
Payer: COMMERCIAL

## 2024-09-27 ENCOUNTER — OFFICE VISIT (OUTPATIENT)
Dept: FAMILY MEDICINE | Facility: CLINIC | Age: 59
End: 2024-09-27
Payer: COMMERCIAL

## 2024-09-27 VITALS
DIASTOLIC BLOOD PRESSURE: 86 MMHG | OXYGEN SATURATION: 98 % | SYSTOLIC BLOOD PRESSURE: 169 MMHG | TEMPERATURE: 98.9 F | HEART RATE: 55 BPM

## 2024-09-27 DIAGNOSIS — M25.512 ACUTE PAIN OF LEFT SHOULDER DUE TO TRAUMA: Primary | ICD-10-CM

## 2024-09-27 DIAGNOSIS — G89.11 ACUTE PAIN OF LEFT SHOULDER DUE TO TRAUMA: Primary | ICD-10-CM

## 2024-09-27 PROCEDURE — 73060 X-RAY EXAM OF HUMERUS: CPT | Mod: LT

## 2024-09-27 PROCEDURE — 99214 OFFICE O/P EST MOD 30 MIN: CPT

## 2024-09-27 RX ORDER — OXYCODONE HYDROCHLORIDE 5 MG/1
5 TABLET ORAL EVERY 6 HOURS PRN
Qty: 12 TABLET | Refills: 0 | Status: SHIPPED | OUTPATIENT
Start: 2024-09-27

## 2024-09-27 RX ORDER — CYCLOBENZAPRINE HCL 5 MG
5 TABLET ORAL 3 TIMES DAILY PRN
Qty: 20 TABLET | Refills: 0 | Status: SHIPPED | OUTPATIENT
Start: 2024-09-27

## 2024-09-27 RX ORDER — OXYCODONE HYDROCHLORIDE 5 MG/1
5 TABLET ORAL EVERY 6 HOURS PRN
Qty: 12 TABLET | Refills: 0 | Status: SHIPPED | OUTPATIENT
Start: 2024-09-27 | End: 2024-09-27

## 2024-09-27 NOTE — PROGRESS NOTES
Assessment & Plan       ICD-10-CM    1. Acute pain of left shoulder due to trauma  M25.512 XR Humerus Left G/E 2 Views    G89.11 Orthopedic  Referral     ARM SLING L     oxyCODONE (ROXICODONE) 5 MG tablet     cyclobenzaprine (FLEXERIL) 5 MG tablet         Healthy adult w/o osteoporosis who had a fall from standing after being spun by a dog leash and has had arm pain since. Will get XR given the persistent pain and decreased ROM.     XR: Pending    XR pending, didn't see a clear fracture on my initial read. Placed patient in sling as I'm pretty concerned about her rotator cuff - she appears to have preserved ROM that is just limited by pain, and the pain is down on the humerus while the shoulder itself feels normal, but since we can't test full ROM with the pain, I can't be sure she doesn't have a tear. Will do ortho referral for further management if needed (if pain not improving, or if XR read concerning) and pain treatment.     Follow up with primary care provider with any problems, questions or concerns or if symptoms worsen or fail to improve. Patient agreed to plan and verbalized understanding.     Subjective     Halina is a 58 year old female who presents to clinic today for the following health issues:  Chief Complaint   Patient presents with    Urgent Care     - Left Arm Pain  - Was trying to catch dog at the vet today, and fell   - Fell onto left arm  - Tried to bear weight onto arm but its causing her pain  - Here today to rule out fx     HPI    Her dog is a 60 lb puppy was excited to see another dog and pt was spun by the leash and fell on L elbow.     Review of Systems    10 point ROS performed and negative except as noted in HPI.     Problem List:  2023-09: History of cholecystectomy  2022-12: Morbid obesity (H)  2013-03: S/P AVR  2012-07: Aortic valve disorder  2000-01: Restless leg syndrome      Past Medical History:   Diagnosis Date    Aortic regurgitation     Aortic valve replaced      Degenerative disc disease     Dyslipidemia     Hashimoto's disease     History of Sabrina-en-Y gastric bypass 2004    Dr. Macario Highest weight 296#    Hypertension     Low back pain     Metabolic syndrome     Mitral valve replaced     Morbid obesity (H)     Morbid obesity with BMI of 40.0-44.9, adult (H)     Other and unspecified postsurgical nonabsorption     S/P gastric bypass     S/P MVR (mitral valve replacement)     Stroke (H)     Stroke (H)     from mass near mitral valve       Social History     Tobacco Use    Smoking status: Former     Current packs/day: 0.00     Types: Cigarettes     Quit date: 3/17/2010     Years since quittin.5    Smokeless tobacco: Never   Substance Use Topics    Alcohol use: No           Objective    BP (!) 169/86   Pulse 55   Temp 98.9  F (37.2  C) (Oral)   SpO2 98%   Physical Exam   Constitutional:       General: Patient is not in acute distress.     Appearance: Normal appearance.   HENT:      Head: Normocephalic and atraumatic.      Right Ear: External ear normal.      Left Ear: External ear normal.      Nose: No congestion, rhinorrhea.      Mouth/Throat:      Mouth: Mucous membranes are moist.      Pharynx: Oropharynx is clear, No exudate.   Eyes:      General: No scleral icterus.     Extraocular Movements: Extraocular movements intact.      Conjunctiva/sclera: Conjunctivae normal.      Pupils: Pupils are equal, round, and reactive to light.   Pulmonary:      Effort: Pulmonary effort is normal.   Cardiovascular:      Regular heart rate  Abdominal:      General: Abdomen is flat.   Musculoskeletal:         General: No swelling or deformity. Very limited ROM at L shoulder dt pain. Alignment appears preserved.      Cervical back: Normal range of motion and neck supple.   Skin:     General: Skin is warm and dry.      Coloration: Skin is not jaundiced.      Findings: No bruising, lesion or rash.   Neurological:      General: No focal deficit present.      Mental Status: Patient  is alert. Mental status is at baseline.   Psychiatric:         Mood and Affect: Mood normal.         Behavior: Behavior normal.         Thought Content: Thought content normal.       Ronda Mejia MD

## 2024-09-30 ENCOUNTER — PATIENT OUTREACH (OUTPATIENT)
Dept: CARE COORDINATION | Facility: CLINIC | Age: 59
End: 2024-09-30
Payer: COMMERCIAL

## 2024-10-01 NOTE — PROGRESS NOTES
ASSESSMENT & PLAN     Today we discussed the underlying etiology/pathology of patient's   1. closed displaced fracture of proximal end of left humerus, initial encounter  DOI: 09/27/24    2. H/O aortic valve replacement-03/14/2013    3. Morbid obesity (H)    4. Hx of gastric bypass 09/14/2004    5. Chronic anticoagulation- warfarin due to cardiac valves replaced    6. TIA (transient ischemic attack) (H)  03/04/2010    7. Mitral valve replaced  03/17/2010      Discussed diagnosis and treatment options with the patient today. A shared decision making model was used. The patient's values and choices were respected. The following represents what was discussed and decided upon by the provider and the patient.   -We discussed the patient had acute fall on 09/27/2024 resulting in a left proximal humerus fracture with greater tuberosity fragment with displacement.    -Patient is comfortable currently in her sling.  Patient has ecchymotic changes throughout the upper arm stopping at the medial elbow level secondary to gravitational swelling and bruising from fracture  - Case was discussed with Dr. Marcin Castañeda with x-rays reviewed today.  Recommendation was to proceed with CT scan of the left shoulder to evaluate fracture pattern for nonoperative versus operative intervention  - All of this information was discussed with the patient and we agreed to proceed with CT scan.  This was ordered.  Patient be contacted by radiology scheduling in the near future to find a mutual time to have CT scan completed  - Patient to see Dr. Castañeda after CT scan of the shoulder has been complete to discuss CT results as well as future treatment options.  - Small amount of oxycodone prescribed for breakthrough pain.  Patient to continue with Tylenol if needed for discomfort but to avoid NSAIDs due to concurrent use of warfarin chronically due to cardiac valve replacements  - Patient to remain in her sling.  - Patient for her employment is  largely sedentary/computer/desk work.  She may do light typing as she feels comfortable remaining in her sling  -X-rays reviewed with the patient today and all questions addressed.    -Call direct clinic number [[phone]] at any time with questions or concerns in regards to your recent office visit with me.     Bereket Oviedo PA-C  [facility]    This note was completed in part using a voice recognition software, any grammatical or context distortion are unintentional and inherent to the software.           SUBJECTIVE  Halina Ibrahim is a/an 58 year old female who is seen in consultation at the request of  Ronda Mejia M.D. for evaluation of left shoulder pain. The patient is seen by themselves.    Onset: 5 day(s) ago, 9/27/24. Patient describes injury as Her dog is a 60 lb puppy was excited to see another dog and pt was spun by the leash and fell on L elbow   Location of Pain: left upper arm and shoulder  Rating of Pain at worst: 10/10  Rating of Pain Currently: 4/10  Worsened by: sleep position, weightbearing. Movement  Better with: Oxycodone, sling  Treatments tried: oxycodone, tizanidine, sling. Wedge pillows for sleep.   Quality: aching, throbbing, sharp  Associated symptoms: no distal numbness or tingling; denies swelling or warmth, swelling, and diffuse bruising over medial upper arm.   Orthopedic history: NO  Relevant surgical history: NO  Social history: social history: works at [employer]. Has not been to work since.     Past Medical History:   Diagnosis Date    Aortic regurgitation     Aortic valve replaced     Degenerative disc disease     Dyslipidemia     Hashimoto's disease     History of Sabrina-en-Y gastric bypass 9/14/2004    Dr. Macario Highest weight 296#    Hypertension     Low back pain     Metabolic syndrome     Mitral valve replaced     Morbid obesity (H)     Morbid obesity with BMI of 40.0-44.9, adult (H)     Other and unspecified postsurgical nonabsorption      S/P gastric bypass     S/P MVR (mitral valve replacement)     Stroke (H)     Stroke (H)     from mass near mitral valve     Social History     Socioeconomic History    Marital status: Single   Tobacco Use    Smoking status: Former     Current packs/day: 0.00     Types: Cigarettes     Quit date: 3/17/2010     Years since quittin.5    Smokeless tobacco: Never   Vaping Use    Vaping status: Never Used   Substance and Sexual Activity    Alcohol use: No    Drug use: No    Sexual activity: Never     Birth control/protection: Surgical     Social Determinants of Health     Financial Resource Strain: Low Risk  (10/30/2023)    [facility]    Financial Resource Strain     Difficulty of Paying Living Expenses: 3   Food Insecurity: No Food Insecurity (10/30/2023)    [facility]    Food Insecurity     Worried About Running Out of Food in the Last Year: 1   Transportation Needs: No Transportation Needs (10/30/2023)    [facility], [facility]    Transportation Needs     Lack of Transportation (Medical): 1   Social Connections: Socially Integrated (10/30/2023)    [facility], [facility]    Social Connections     Frequency of Communication with Friends and Family: 0    Received from [facility], [facility]    Intimate Partner Violence   Housing Stability: Low Risk  (10/30/2023)    [facility], [facility]    Housing Stability     Unable to Pay for Housing in the Last Year: 1         Patient's past medical, surgical, social, and family histories were personally reviewed today and no changes are  noted.    REVIEW OF SYSTEMS:  10 point ROS is negative other than symptoms noted above in HPI, Past Medical History or as stated below  Constitutional: NEGATIVE for fever, chills, change in weight  Skin: NEGATIVE for worrisome rashes, moles or lesions  GI/: NEGATIVE for bowel or bladder changes  Neuro: NEGATIVE for weakness, dizziness or paresthesias    OBJECTIVE:  Vital signs as noted in EPIC for 10/1/2024  General: healthy, alert and in no distress  HEENT: no scleral icterus or conjunctival erythema  Skin: no suspicious lesions or rash. No jaundice.  CV: no pedal edema  Resp: normal respiratory effort without conversational dyspnea   Psych: normal mood and affect  Neuro: Normal light sensory exam of lower extremity      MSK:  Exam shows a very pleasant 58-year-old female wearing a sling on the left upper extremity.  She ambulates without assistive device.  She is alert and oriented x 3.  She is ambidextrous with her hands.  Radial pulses +2 and symmetric.  Patient has full range of motion of fingers, hands and wrist bilaterally.  She has adequate motion of the elbows bilaterally with flexion and extension with no pain or pronation or supination.  Left upper extremity shows purple ecchymotic changes along the medial humerus into the axillary region.  No evidence cellulitis.  No open wounds or cuts.  Patient is tender around the proximal shoulder more so laterally.  No pain at the sternum, SC joint, clavicle or AC joint.  Previous surgical incision noted on the chest consistent with open heart surgery.  Deltoid fires appropriately but this does generate pain in the left shoulder.  Remaining motion of the rotator cuff not tested due to known proximal humerus fracture.  She is grossly neurovascular intact including axillary, radial, ulnar and median nerves.  Gentle range of motion of the C-spine does not cause pain.            Personal Independent visualization of the below images done today:  3 views of the  patient's left shoulder are obtained and reviewed today and compared to previous humerus films.  Patient has a fracture of the greater tuberosity with mild displacement of the proximal humerus.  Open heart cerclage wires noted in the chest.  AC joint arthrosis noted.  Subacromial space maintained.    Patient's conditions were thoroughly discussed during today's visit with total time reviewing patient's previous medical records/history/radiology, face-to-face examination and discussion and plan of care with the patient and documentation being 30 minutes for today's clinical visit  Bereket Oviedo PA-C  [facility]    This note was completed in part using a voice recognition software, any grammatical or context distortion are unintentional and inherent to the software.

## 2024-10-03 ENCOUNTER — ANCILLARY PROCEDURE (OUTPATIENT)
Dept: GENERAL RADIOLOGY | Facility: CLINIC | Age: 59
End: 2024-10-03
Attending: PHYSICIAN ASSISTANT
Payer: COMMERCIAL

## 2024-10-03 ENCOUNTER — OFFICE VISIT (OUTPATIENT)
Dept: ORTHOPEDICS | Facility: CLINIC | Age: 59
End: 2024-10-03
Payer: COMMERCIAL

## 2024-10-03 VITALS — SYSTOLIC BLOOD PRESSURE: 128 MMHG | DIASTOLIC BLOOD PRESSURE: 86 MMHG

## 2024-10-03 DIAGNOSIS — E66.01 MORBID OBESITY (H): ICD-10-CM

## 2024-10-03 DIAGNOSIS — Z95.2 H/O AORTIC VALVE REPLACEMENT: ICD-10-CM

## 2024-10-03 DIAGNOSIS — Z79.01 CHRONIC ANTICOAGULATION: ICD-10-CM

## 2024-10-03 DIAGNOSIS — G89.11 ACUTE PAIN OF LEFT SHOULDER DUE TO TRAUMA: ICD-10-CM

## 2024-10-03 DIAGNOSIS — M25.512 ACUTE PAIN OF LEFT SHOULDER DUE TO TRAUMA: ICD-10-CM

## 2024-10-03 DIAGNOSIS — Z95.2 MITRAL VALVE REPLACED: ICD-10-CM

## 2024-10-03 DIAGNOSIS — G45.9 TIA (TRANSIENT ISCHEMIC ATTACK): ICD-10-CM

## 2024-10-03 DIAGNOSIS — Z98.84 HX OF GASTRIC BYPASS: ICD-10-CM

## 2024-10-03 DIAGNOSIS — S42.292A OTHER CLOSED DISPLACED FRACTURE OF PROXIMAL END OF LEFT HUMERUS, INITIAL ENCOUNTER: Primary | ICD-10-CM

## 2024-10-03 PROCEDURE — 99204 OFFICE O/P NEW MOD 45 MIN: CPT | Performed by: PHYSICIAN ASSISTANT

## 2024-10-03 PROCEDURE — 73030 X-RAY EXAM OF SHOULDER: CPT | Mod: TC | Performed by: RADIOLOGY

## 2024-10-03 RX ORDER — OXYCODONE HYDROCHLORIDE 5 MG/1
5 TABLET ORAL 2 TIMES DAILY PRN
Qty: 10 TABLET | Refills: 0 | Status: SHIPPED | OUTPATIENT
Start: 2024-10-03 | End: 2024-10-08

## 2024-10-03 NOTE — PATIENT INSTRUCTIONS
Today we discussed the underlying etiology/pathology of patient's   1. closed displaced fracture of proximal end of left humerus, initial encounter  DOI: 09/27/24    2. H/O aortic valve replacement-03/14/2013    3. Morbid obesity (H)    4. Hx of gastric bypass 09/14/2004    5. Chronic anticoagulation- warfarin due to cardiac valves replaced    6. TIA (transient ischemic attack) (H)  03/04/2010    7. Mitral valve replaced  03/17/2010      Discussed diagnosis and treatment options with the patient today. A shared decision making model was used. The patient's values and choices were respected. The following represents what was discussed and decided upon by the provider and the patient.   -We discussed the patient had acute fall on 09/27/2024 resulting in a left proximal humerus fracture with greater tuberosity fragment with displacement.    -Patient is comfortable currently in her sling.  Patient has ecchymotic changes throughout the upper arm stopping at the medial elbow level secondary to gravitational swelling and bruising from fracture  - Case was discussed with Dr. Marcin Castañeda with x-rays reviewed today.  Recommendation was to proceed with CT scan of the left shoulder to evaluate fracture pattern for nonoperative versus operative intervention  - All of this information was discussed with the patient and we agreed to proceed with CT scan.  This was ordered.  Patient be contacted by radiology scheduling in the near future to find a mutual time to have CT scan completed  - Patient to see Dr. Castañeda after CT scan of the shoulder has been complete to discuss CT results as well as future treatment options.  - Small amount of oxycodone prescribed for breakthrough pain.  Patient to continue with Tylenol if needed for discomfort but to avoid NSAIDs due to concurrent use of warfarin chronically due to cardiac valve replacements  - Patient to remain in her sling.  - Patient for her employment is largely  sedentary/computer/desk work.  She may do light typing as she feels comfortable remaining in her sling  -X-rays reviewed with the patient today and all questions addressed.    -Call direct clinic number [[phone]] at any time with questions or concerns in regards to your recent office visit with me.     Bereket Oviedo PA-C  [facility]    This note was completed in part using a voice recognition software, any grammatical or context distortion are unintentional and inherent to the software.

## 2024-10-03 NOTE — LETTER
10/3/2024      Halina Ibrahim  [address] [address]      Dear Colleague,    Thank you for referring your patient, Halina Ibrahim, to the [facility]. Please see a copy of my visit note below.    ASSESSMENT & PLAN     Today we discussed the underlying etiology/pathology of patient's   1. closed displaced fracture of proximal end of left humerus, initial encounter  DOI: 09/27/24    2. H/O aortic valve replacement-03/14/2013    3. Morbid obesity (H)    4. Hx of gastric bypass 09/14/2004    5. Chronic anticoagulation- warfarin due to cardiac valves replaced    6. TIA (transient ischemic attack) (H)  03/04/2010    7. Mitral valve replaced  03/17/2010      Discussed diagnosis and treatment options with the patient today. A shared decision making model was used. The patient's values and choices were respected. The following represents what was discussed and decided upon by the provider and the patient.   -We discussed the patient had acute fall on 09/27/2024 resulting in a left proximal humerus fracture with greater tuberosity fragment with displacement.    -Patient is comfortable currently in her sling.  Patient has ecchymotic changes throughout the upper arm stopping at the medial elbow level secondary to gravitational swelling and bruising from fracture  - Case was discussed with Dr. Marcin Castañeda with x-rays reviewed today.  Recommendation was to proceed with CT scan of the left shoulder to evaluate fracture pattern for nonoperative versus operative intervention  - All of this information was discussed with the patient and we agreed to proceed with CT scan.  This was ordered.  Patient be contacted by radiology scheduling in the near future to find a mutual time to have CT scan completed  - Patient to see Dr. Castañeda after CT scan of the shoulder has been complete to discuss CT results as well as future treatment options.  - Small amount of oxycodone  prescribed for breakthrough pain.  Patient to continue with Tylenol if needed for discomfort but to avoid NSAIDs due to concurrent use of warfarin chronically due to cardiac valve replacements  - Patient to remain in her sling.  - Patient for her employment is largely sedentary/computer/desk work.  She may do light typing as she feels comfortable remaining in her sling  -X-rays reviewed with the patient today and all questions addressed.    -Call direct clinic number [[phone]] at any time with questions or concerns in regards to your recent office visit with me.     Bereket Oviedo PA-C  [facility]    This note was completed in part using a voice recognition software, any grammatical or context distortion are unintentional and inherent to the software.           SUBJECTIVE  Halina Ibrahim is a/an 58 year old female who is seen in consultation at the request of  Ronda Mejia M.D. for evaluation of left shoulder pain. The patient is seen by themselves.    Onset: 5 day(s) ago, 9/27/24. Patient describes injury as Her dog is a 60 lb puppy was excited to see another dog and pt was spun by the leash and fell on L elbow   Location of Pain: left upper arm and shoulder  Rating of Pain at worst: 10/10  Rating of Pain Currently: 4/10  Worsened by: sleep position, weightbearing. Movement  Better with: Oxycodone, sling  Treatments tried: oxycodone, tizanidine, sling. Wedge pillows for sleep.   Quality: aching, throbbing, sharp  Associated symptoms: no distal numbness or tingling; denies swelling or warmth, swelling, and diffuse bruising over medial upper arm.   Orthopedic history: NO  Relevant surgical history: NO  Social history: social history: works at Home[employer]. Has not been to work since.     Past Medical History:   Diagnosis Date     Aortic regurgitation      Aortic valve replaced      Degenerative disc disease      Dyslipidemia      Hashimoto's disease      History of Sabrina-en-Y  gastric bypass 2004    Dr. Macario Highest weight 296#     Hypertension      Low back pain      Metabolic syndrome      Mitral valve replaced      Morbid obesity (H)      Morbid obesity with BMI of 40.0-44.9, adult (H)      Other and unspecified postsurgical nonabsorption      S/P gastric bypass      S/P MVR (mitral valve replacement)      Stroke (H)      Stroke (H)     from mass near mitral valve     Social History     Socioeconomic History     Marital status: Single   Tobacco Use     Smoking status: Former     Current packs/day: 0.00     Types: Cigarettes     Quit date: 3/17/2010     Years since quittin.5     Smokeless tobacco: Never   Vaping Use     Vaping status: Never Used   Substance and Sexual Activity     Alcohol use: No     Drug use: No     Sexual activity: Never     Birth control/protection: Surgical     Social Determinants of Health     Financial Resource Strain: Low Risk  (10/30/2023)    Received from [facility], [facility]    Financial Resource Strain      Difficulty of Paying Living Expenses: 3   Food Insecurity: No Food Insecurity (10/30/2023)    Received from [facility], [facility]    Food Insecurity      Worried About Running Out of Food in the Last Year: 1   Transportation Needs: No Transportation Needs (10/30/2023)    Received from [facility], [facility]    Transportation Needs      Lack of Transportation (Medical): 1   Social Connections: Socially Integrated (10/30/2023)    Received from [facility], [facility]    Social Connections      Frequency of Communication with Friends and Family: 0    Received from [facility], [facility]    Intimate Partner Violence   Housing Stability: Low Risk  (10/30/2023)    Received  from [facility], [facility]    Housing Stability      Unable to Pay for Housing in the Last Year: 1         Patient's past medical, surgical, social, and family histories were personally reviewed today and no changes are noted.    REVIEW OF SYSTEMS:  10 point ROS is negative other than symptoms noted above in HPI, Past Medical History or as stated below  Constitutional: NEGATIVE for fever, chills, change in weight  Skin: NEGATIVE for worrisome rashes, moles or lesions  GI/: NEGATIVE for bowel or bladder changes  Neuro: NEGATIVE for weakness, dizziness or paresthesias    OBJECTIVE:  Vital signs as noted in EPIC for 10/1/2024  General: healthy, alert and in no distress  HEENT: no scleral icterus or conjunctival erythema  Skin: no suspicious lesions or rash. No jaundice.  CV: no pedal edema  Resp: normal respiratory effort without conversational dyspnea   Psych: normal mood and affect  Neuro: Normal light sensory exam of lower extremity      MSK:  Exam shows a very pleasant 58-year-old female wearing a sling on the left upper extremity.  She ambulates without assistive device.  She is alert and oriented x 3.  She is ambidextrous with her hands.  Radial pulses +2 and symmetric.  Patient has full range of motion of fingers, hands and wrist bilaterally.  She has adequate motion of the elbows bilaterally with flexion and extension with no pain or pronation or supination.  Left upper extremity shows purple ecchymotic changes along the medial humerus into the axillary region.  No evidence cellulitis.  No open wounds or cuts.  Patient is tender around the proximal shoulder more so laterally.  No pain at the sternum, SC joint, clavicle or AC joint.  Previous surgical incision noted on the chest consistent with open heart surgery.  Deltoid fires appropriately but this does generate pain in the left shoulder.  Remaining motion of the rotator cuff not tested  due to known proximal humerus fracture.  She is grossly neurovascular intact including axillary, radial, ulnar and median nerves.  Gentle range of motion of the C-spine does not cause pain.            Personal Independent visualization of the below images done today:  3 views of the patient's left shoulder are obtained and reviewed today and compared to previous humerus films.  Patient has a fracture of the greater tuberosity with mild displacement of the proximal humerus.  Open heart cerclage wires noted in the chest.  AC joint arthrosis noted.  Subacromial space maintained.    Patient's conditions were thoroughly discussed during today's visit with total time reviewing patient's previous medical records/history/radiology, face-to-face examination and discussion and plan of care with the patient and documentation being 30 minutes for today's clinical visit  Bereket Oviedo PA-C  [facility]    This note was completed in part using a voice recognition software, any grammatical or context distortion are unintentional and inherent to the software.       Again, thank you for allowing me to participate in the care of your patient.        Sincerely,        Bereket Oviedo PA-C

## 2024-10-04 ENCOUNTER — HOSPITAL ENCOUNTER (OUTPATIENT)
Dept: CT IMAGING | Facility: HOSPITAL | Age: 59
Discharge: HOME | End: 2024-10-04
Attending: PHYSICIAN ASSISTANT | Admitting: PHYSICIAN ASSISTANT
Payer: COMMERCIAL

## 2024-10-04 DIAGNOSIS — S42.292A OTHER CLOSED DISPLACED FRACTURE OF PROXIMAL END OF LEFT HUMERUS, INITIAL ENCOUNTER: ICD-10-CM

## 2024-10-04 PROCEDURE — 73200 CT UPPER EXTREMITY W/O DYE: CPT | Mod: LT

## 2024-10-10 ENCOUNTER — OFFICE VISIT (OUTPATIENT)
Dept: ORTHOPEDICS | Facility: CLINIC | Age: 59
End: 2024-10-10
Attending: PHYSICIAN ASSISTANT
Payer: COMMERCIAL

## 2024-10-10 DIAGNOSIS — S42.292A OTHER CLOSED DISPLACED FRACTURE OF PROXIMAL END OF LEFT HUMERUS, INITIAL ENCOUNTER: ICD-10-CM

## 2024-10-10 PROCEDURE — 99213 OFFICE O/P EST LOW 20 MIN: CPT | Performed by: STUDENT IN AN ORGANIZED HEALTH CARE EDUCATION/TRAINING PROGRAM

## 2024-10-10 NOTE — LETTER
October 10, 2024      Halina Ibrahim  [address] [address]        To Whom It May Concern:      Halina Ibrahim was seen in our clinic for her left shoulder. She may return to work with the following: limited to light duty - lifting no greater than 1 pounds and no use of arms over shoulders until her follow up appointment in 4 weeks       Sincerely,      Marcin Castañeda MD

## 2024-10-10 NOTE — LETTER
October 10, 2024      Halina Ibrahim  [address] [address]        To Whom It May Concern:    Halina Ibrahim was seen in our clinic fr her left shoulder. She may return to work with the following: limited to light duty - lifting no greater than 1 pounds and no use of arms over shoulders until her follow up appointment in 4 weeks     Sincerely,      Marcin Castañeda MD

## 2024-10-10 NOTE — LETTER
10/10/2024      Halina Ibrahim  [address] [address]      Dear Colleague,    Thank you for referring your patient, Halina Ibrahim, to the [facility]. Please see a copy of my visit note below.    CC: Left greater tuberosity fracture    HPI: Patient is a 58-year-old female seen here today for evaluation of an acute left greater tuberosity fracture.  This occurred during a fall when her dog pulled her over at the vet.  She landed on her left elbow.  Since that time she has had pain in the left shoulder with attempted range of motion.  She was at a local urgent care and then referred to one of [facility] clinics.  X-ray and CT scan show a greater tuberosity fracture with approximately 3 mm displacement.  She was referred to my clinic to review her imaging and discuss treatment options.    At this time she is not having any significant pain in her elbow or wrist.  She denies any numbness or tingling in her hand.  She has been using a sling for comfort.  She denies any prior injury to the shoulder.  She has a past medical history significant for cardiac valve replacement and is on warfarin.  Last hemoglobin A1c is 5.5.  She works remotely for [employer].  She does not smoke.  She enjoys crocheting for exercise and hobbies.         Patient Active Problem List   Diagnosis     Aortic valve disorder     Mitral valve replaced     Restless leg syndrome     Morbid obesity (H)     History of cholecystectomy     closed displaced fracture of proximal end of left humerus, initial encounter  DOI: 09/27/24     Hx of gastric bypass 09/14/2004     Chronic anticoagulation- warfarin due to cardiac valves replaced     TIA (transient ischemic attack) (H)  03/04/2010          Past Medical History:   Diagnosis Date     Aortic regurgitation      Aortic valve replaced      Degenerative disc disease      Dyslipidemia      Hashimoto's disease      History of Sabrina-en-Y gastric bypass 9/14/2004     Dr. Macario Highest weight 296#     Hypertension      Low back pain      Metabolic syndrome      Mitral valve replaced      Morbid obesity (H)      Morbid obesity with BMI of 40.0-44.9, adult (H)      Other and unspecified postsurgical nonabsorption      S/P gastric bypass      S/P MVR (mitral valve replacement)      Stroke (H)      Stroke (H)     from mass near mitral valve          Past Surgical History:   Procedure Laterality Date     AORTIC VALVE REPLACEMENT       OTHER SURGICAL HISTORY      uterine ablation     REDO STERNOTOMY REPLACE VALVE AORTIC  3/14/2013    Procedure: REDO STERNOTOMY REPLACE VALVE AORTIC;  Redo-Median  Sternotomy, Aortic root enlargement . aortic Valve  Replacement  on pump oxygenator;  Surgeon: Buck Paula MD;  Location: [facility] OR     REPLACE VALVE MITRAL       REVISION NANY-EN-Y       s/p gastric bypass[       s/p mitral valve replacement[       TUBAL LIGATION            Current Outpatient Medications   Medication Sig Dispense Refill     B Complex-C (SUPER B COMPLEX) TABS Take 1 BID       BIOTIN PO Take 10,000 mcg by mouth daily       Calcium 600 MG tablet Take 1 tablet by mouth 2 times daily.       citalopram (CELEXA) 10 MG tablet Take 10 mg by mouth daily       clindamycin (CLEOCIN) 300 MG capsule TAKE 2 CAPSULES 1 HOUR PRIOR TO DENTAL APPOINTMENTS.       clobetasol (TEMOVATE) 0.05 % external ointment Apply topically as needed       cyclobenzaprine (FLEXERIL) 5 MG tablet Take 1 tablet (5 mg) by mouth 3 times daily as needed for muscle spasms. 20 tablet 0     gabapentin (NEURONTIN) 100 MG capsule TAKE  1 CAPSULE IN ADDITION  MG PRN.       gabapentin (NEURONTIN) 300 MG capsule Take 3 capsules by mouth at bedtime       levothyroxine (SYNTHROID/LEVOTHROID) 175 MCG tablet        losartan (COZAAR) 100 MG tablet Take 100 mg by mouth       metoprolol succinate ER (TOPROL XL) 25 MG 24 hr tablet TAKE ONE TABLET BY MOUTH EVERY DAY 90 tablet 2     morphine (MS CONTIN) 15 MG CR tablet  "Take 15 mg by mouth every 12 hours       Multiple Vitamin (DAILY MULTIVITAMIN PO) Take 1 twice a day       naloxone (NARCAN) 4 MG/0.1ML nasal spray Spray 1 spray (4 mg) into one nostril alternating nostrils as needed for opioid reversal. every 2-3 minutes until assistance arrives 2 each 11     oxyCODONE (ROXICODONE) 5 MG tablet Take 1 tablet (5 mg) by mouth every 6 hours as needed for pain. (Patient not taking: Reported on 10/3/2024) 12 tablet 0     Semaglutide-Weight Management (WEGOVY) 1 MG/0.5ML pen Inject 1 mg subcutaneously once a week. 2 mL 0     tretinoin (RETIN-A) 0.05 % external cream Apply topically at bedtime       vitamin D3 (CHOLECALCIFEROL) 125 MCG (5000 UT) tablet Take 5,000 Units by mouth       warfarin (COUMADIN) 2.5 MG tablet Take 2 tablets by mouth daily. 90 tablet 4          Allergies   Allergen Reactions     Tramadol Nausea     No relief and \"funny feeling\" after, not really effective       Trazodone Other (See Comments)     Made restless legs worse     Pcn [Penicillins] Hives          Family History   Problem Relation Age of Onset     Cancer Mother      Heart Disease Father      Heart Disease Sister      Obesity Sister      Cancer Maternal Grandmother           Social History     Tobacco Use     Smoking status: Former     Current packs/day: 0.00     Types: Cigarettes     Quit date: 3/17/2010     Years since quittin.5     Smokeless tobacco: Never   Substance Use Topics     Alcohol use: No            Objective:  Physical Exam:  LUE: No open wounds or lacerations noted.  No pain to palpation over the clavicle or AC joint.  Mild pain to palpation over the lateral aspect of the shoulder.  Pain over the lateral aspect of the shoulder with passive and active range of motion below the shoulder.  Fires deltoid.  Mild ecchymosis down the anterior aspect of the arm.  No pain to palpation over the medial epicondyle or lateral condyle of the humerus.  No pain to palpation over the olecranon.  No pain " through passive flexion, extension, pronation and supination at the elbow.  5/5 wrist flexion extension as well as flexion and extension of the MCP, PIP, and DIP in all 5 digits.  Sensation tact in radial, median, and ulnar nerve distribution in the hand.  2+ radial pulse    Imagin view x-ray of the left humerus from  and 4 view x-ray of the left shoulder from October 3 was reviewed.  This shows a isolated greater tuberosity fracture which approximately 3 mm displacement.  I do not appreciate any fracture line extending across the neck of the humerus and the medial calcar.  No subluxation or dislocation noted.    CT scan without contrast of the left shoulder from  was reviewed.  This again demonstrates an isolated greater tuberosity fracture of the proximal humerus.  There is a maximum of 3 mm of displacement.  I not appreciating fracture line across the neck of the humerus    Assessment and Plan: Patient is a 58-year-old female seen here today for evaluation of an isolated left greater tuberosity fracture.  On CT scan x-ray, I would not see any extension across the neck of the humerus.  The fracture is displaced a maximum of 3 mm.  I had the opportunity to review her imaging with her today.  As her fracture is displaced less than 5 mm, I recommended nonoperative treatment of this fracture.  We discussed the typical nonoperative protocol.  I discussed 4 weeks in a sling for comfort starting gentle passive range of motion below the shoulder at this time.  I also recommended a 1 pound lifting restriction.  I recommend physical therapy to start below shoulder passive range of motion and progress per the proximal humerus nonoperative protocol.  I did discuss with her that the risk of nonunion with this fracture pattern is very low.  There is a risk of stiffness during the recovery period and this risk can be decreased by starting physical therapy now within the given restrictions.  I did  discuss with her the risk of posttraumatic arthritis stiffness and loss of function with this injury.  I adapting answer questions.  She is in agreement with nonoperative management.  I will provide her a note stating that she may return to work with below shoulder level activity only and a 1 pound lifting restriction.  She will continue her sling for comfort.  We will place an order for physical therapy.  She will follow-up with me in 4 weeks for repeat x-ray.    Marcin Castañeda MD    [facility]   Department of Orthopedic Surgery    Disclaimer: This note consists of symbols derived from keyboarding, dictation and/or voice recognition software. As a result, there may be errors in the script that have gone undetected. Please consider this when interpreting information found in this chart.       Again, thank you for allowing me to participate in the care of your patient.        Sincerely,        Marcin Castañeda MD

## 2024-10-10 NOTE — PROGRESS NOTES
CC: Left greater tuberosity fracture    HPI: Patient is a 58-year-old female seen here today for evaluation of an acute left greater tuberosity fracture.  This occurred during a fall when her dog pulled her over at the vet.  She landed on her left elbow.  Since that time she has had pain in the left shoulder with attempted range of motion.  She was at a local urgent care and then referred to one of my [facility] [place] [facility].  X-ray and CT scan show a greater tuberosity fracture with approximately 3 mm displacement.  She was referred to my clinic to review her imaging and discuss treatment options.    At this time she is not having any significant pain in her elbow or wrist.  She denies any numbness or tingling in her hand.  She has been using a sling for comfort.  She denies any prior injury to the shoulder.  She has a past medical history significant for cardiac valve replacement and is on warfarin.  Last hemoglobin A1c is 5.5.  She works remotely for [employer].  She does not smoke.  She enjoys crocheting for exercise and hobbies.         Patient Active Problem List   Diagnosis    Aortic valve disorder    Mitral valve replaced    Restless leg syndrome    Morbid obesity (H)    History of cholecystectomy    closed displaced fracture of proximal end of left humerus, initial encounter  DOI: 09/27/24    Hx of gastric bypass 09/14/2004    Chronic anticoagulation- warfarin due to cardiac valves replaced    TIA (transient ischemic attack) (H)  03/04/2010          Past Medical History:   Diagnosis Date    Aortic regurgitation     Aortic valve replaced     Degenerative disc disease     Dyslipidemia     Hashimoto's disease     History of Sabrina-en-Y gastric bypass 9/14/2004    Dr. Macario Highest weight 296#    Hypertension     Low back pain     Metabolic syndrome     Mitral valve replaced     Morbid obesity (H)     Morbid obesity with BMI of 40.0-44.9, adult (H)     Other and unspecified postsurgical nonabsorption     S/P gastric  bypass     S/P MVR (mitral valve replacement)     Stroke (H)     Stroke (H)     from mass near mitral valve          Past Surgical History:   Procedure Laterality Date    AORTIC VALVE REPLACEMENT      OTHER SURGICAL HISTORY      uterine ablation    REDO STERNOTOMY REPLACE VALVE AORTIC  3/14/2013    Procedure: REDO STERNOTOMY REPLACE VALVE AORTIC;  Redo-Median  Sternotomy, Aortic root enlargement . aortic Valve  Replacement  on pump oxygenator;  Surgeon: Buck Paula MD;  Location: [facility] OR    REPLACE VALVE MITRAL      REVISION NANY-EN-Y      s/p gastric bypass[      s/p mitral valve replacement[      TUBAL LIGATION            Current Outpatient Medications   Medication Sig Dispense Refill    B Complex-C (SUPER B COMPLEX) TABS Take 1 BID      BIOTIN PO Take 10,000 mcg by mouth daily      Calcium 600 MG tablet Take 1 tablet by mouth 2 times daily.      citalopram (CELEXA) 10 MG tablet Take 10 mg by mouth daily      clindamycin (CLEOCIN) 300 MG capsule TAKE 2 CAPSULES 1 HOUR PRIOR TO DENTAL APPOINTMENTS.      clobetasol (TEMOVATE) 0.05 % external ointment Apply topically as needed      cyclobenzaprine (FLEXERIL) 5 MG tablet Take 1 tablet (5 mg) by mouth 3 times daily as needed for muscle spasms. 20 tablet 0    gabapentin (NEURONTIN) 100 MG capsule TAKE  1 CAPSULE IN ADDITION  MG PRN.      gabapentin (NEURONTIN) 300 MG capsule Take 3 capsules by mouth at bedtime      levothyroxine (SYNTHROID/LEVOTHROID) 175 MCG tablet       losartan (COZAAR) 100 MG tablet Take 100 mg by mouth      metoprolol succinate ER (TOPROL XL) 25 MG 24 hr tablet TAKE ONE TABLET BY MOUTH EVERY DAY 90 tablet 2    morphine (MS CONTIN) 15 MG CR tablet Take 15 mg by mouth every 12 hours      Multiple Vitamin (DAILY MULTIVITAMIN PO) Take 1 twice a day      naloxone (NARCAN) 4 MG/0.1ML nasal spray Spray 1 spray (4 mg) into one nostril alternating nostrils as needed for opioid reversal. every 2-3 minutes until assistance arrives 2 each 11  "   oxyCODONE (ROXICODONE) 5 MG tablet Take 1 tablet (5 mg) by mouth every 6 hours as needed for pain. (Patient not taking: Reported on 10/3/2024) 12 tablet 0    Semaglutide-Weight Management (WEGOVY) 1 MG/0.5ML pen Inject 1 mg subcutaneously once a week. 2 mL 0    tretinoin (RETIN-A) 0.05 % external cream Apply topically at bedtime      vitamin D3 (CHOLECALCIFEROL) 125 MCG (5000 UT) tablet Take 5,000 Units by mouth      warfarin (COUMADIN) 2.5 MG tablet Take 2 tablets by mouth daily. 90 tablet 4          Allergies   Allergen Reactions    Tramadol Nausea     No relief and \"funny feeling\" after, not really effective      Trazodone Other (See Comments)     Made restless legs worse    Pcn [Penicillins] Hives          Family History   Problem Relation Age of Onset    Cancer Mother     Heart Disease Father     Heart Disease Sister     Obesity Sister     Cancer Maternal Grandmother           Social History     Tobacco Use    Smoking status: Former     Current packs/day: 0.00     Types: Cigarettes     Quit date: 3/17/2010     Years since quittin.5    Smokeless tobacco: Never   Substance Use Topics    Alcohol use: No            Objective:  Physical Exam:  LUE: No open wounds or lacerations noted.  No pain to palpation over the clavicle or AC joint.  Mild pain to palpation over the lateral aspect of the shoulder.  Pain over the lateral aspect of the shoulder with passive and active range of motion below the shoulder.  Fires deltoid.  Mild ecchymosis down the anterior aspect of the arm.  No pain to palpation over the medial epicondyle or lateral condyle of the humerus.  No pain to palpation over the olecranon.  No pain through passive flexion, extension, pronation and supination at the elbow.  5/5 wrist flexion extension as well as flexion and extension of the MCP, PIP, and DIP in all 5 digits.  Sensation tact in radial, median, and ulnar nerve distribution in the hand.  2+ radial pulse    Imagin view x-ray of the " left humerus from September 27 and 4 view x-ray of the left shoulder from October 3 was reviewed.  This shows a isolated greater tuberosity fracture which approximately 3 mm displacement.  I do not appreciate any fracture line extending across the neck of the humerus and the medial calcar.  No subluxation or dislocation noted.    CT scan without contrast of the left shoulder from October 4 was reviewed.  This again demonstrates an isolated greater tuberosity fracture of the proximal humerus.  There is a maximum of 3 mm of displacement.  I not appreciating fracture line across the neck of the humerus    Assessment and Plan: Patient is a 58-year-old female seen here today for evaluation of an isolated left greater tuberosity fracture.  On CT scan x-ray, I would not see any extension across the neck of the humerus.  The fracture is displaced a maximum of 3 mm.  I had the opportunity to review her imaging with her today.  As her fracture is displaced less than 5 mm, I recommended nonoperative treatment of this fracture.  We discussed the typical nonoperative protocol.  I discussed 4 weeks in a sling for comfort starting gentle passive range of motion below the shoulder at this time.  I also recommended a 1 pound lifting restriction.  I recommend physical therapy to start below shoulder passive range of motion and progress per the proximal humerus nonoperative protocol.  I did discuss with her that the risk of nonunion with this fracture pattern is very low.  There is a risk of stiffness during the recovery period and this risk can be decreased by starting physical therapy now within the given restrictions.  I did discuss with her the risk of posttraumatic arthritis stiffness and loss of function with this injury.  I adapting answer questions.  She is in agreement with nonoperative management.  I will provide her a note stating that she may return to work with below shoulder level activity only and a 1 pound lifting  restriction.  She will continue her sling for comfort.  We will place an order for physical therapy.  She will follow-up with me in 4 weeks for repeat x-ray.    Marcin Castañeda MD    [facility]   Department of Orthopedic Surgery    Disclaimer: This note consists of symbols derived from keyboarding, dictation and/or voice recognition software. As a result, there may be errors in the script that have gone undetected. Please consider this when interpreting information found in this chart.

## 2024-10-15 ENCOUNTER — TELEPHONE (OUTPATIENT)
Dept: ORTHOPEDICS | Facility: CLINIC | Age: 59
End: 2024-10-15
Payer: COMMERCIAL

## 2024-10-15 NOTE — TELEPHONE ENCOUNTER
Reason for Call:  Form, our goal is to have forms completed with 7 days, however, some forms may require a visit or additional information.    Type of letter, form or note:  FMLA     Who is the form from?: Insurance    Where did the form come from: form was faxed in    Phone number of person requesting form: [phone]  Can we leave a detailed message on this number:  Not Applicable    Desired completion date of form: asap       How will form be returned?:  fax to [phone]    Has the patient signed a consent form for release of information (may be included with form)? NO    Additional comments: none     Form was started and place in Provider Basket for provider review/ completion at [facility] .

## 2024-10-16 ENCOUNTER — THERAPY VISIT (OUTPATIENT)
Dept: PHYSICAL THERAPY | Facility: REHABILITATION | Age: 59
End: 2024-10-16
Attending: STUDENT IN AN ORGANIZED HEALTH CARE EDUCATION/TRAINING PROGRAM
Payer: COMMERCIAL

## 2024-10-16 DIAGNOSIS — S42.292A OTHER CLOSED DISPLACED FRACTURE OF PROXIMAL END OF LEFT HUMERUS, INITIAL ENCOUNTER: ICD-10-CM

## 2024-10-16 PROCEDURE — 97161 PT EVAL LOW COMPLEX 20 MIN: CPT | Mod: GP

## 2024-10-16 PROCEDURE — 97110 THERAPEUTIC EXERCISES: CPT | Mod: GP

## 2024-10-16 ASSESSMENT — ACTIVITIES OF DAILY LIVING (ADL)
TOUCHING_THE_BACK_OF_YOUR_NECK: 2
WASHING_YOUR_HAIR?: 5
PUTTING_ON_AN_UNDERSHIRT_OR_A_PULLOVER_SWEATER: 3
PUTTING_ON_YOUR_PANTS: 2
AT_ITS_WORST?: 3
PLEASE_INDICATE_YOR_PRIMARY_REASON_FOR_REFERRAL_TO_THERAPY:: SHOULDER
WASHING_YOUR_BACK: 10
REACHING_FOR_SOMETHING_ON_A_HIGH_SHELF: 10
PUSHING_WITH_THE_INVOLVED_ARM: 10

## 2024-10-16 NOTE — PROGRESS NOTES
PHYSICAL THERAPY EVALUATION  Type of Visit: Evaluation       Fall Risk Screen:  Fall screen completed by: PT  Have you fallen 2 or more times in the past year?: No  Have you fallen and had an injury in the past year?: Yes  Is patient a fall risk?: No  Fall screen comments: See subjective report.    Subjective       Halina reports that on 9/27/24, she was at the vet with her dog, and he reacted to another dog and pulled her. As she tried to maintain her balance, she fell and landed on her left elbow. They did imaging which later confirmed a proximal humerus fracture. She met with orthopedic surgery who recommended conservative treatment, so she now presents to physical therapy for conservative management. Overall since initial injury, she reports improvement. She wears a sling for comfort and per surgeon's guidance (see flowsheet for precautions). She denies any numbness or tingling, and she also reports that her elbow has been feeling pretty good other than deconditioning from the sling use. Worst pain: 10/10. Best pain: 1/10. Current pain: 1/10. She writes and shoots pool left-handed, but everything else is with her right hand.  Presenting condition or subjective complaint: Minimally displaced fracture of the greater tuberosity of the humerus  Date of onset: 09/27/24    Relevant medical history: Depression; High blood pressure; Implanted device; Menopause; Migraines or headaches; Overweight; Sleep disorder like apnea; Stroke; Thyroid problems   Dates & types of surgery: Listed in MyChart; too many to list here    Prior diagnostic imaging/testing results: CT scan; X-ray     Prior therapy history for the same diagnosis, illness or injury: No      Prior Level of Function  Transfers: Independent  Ambulation: Independent  ADL: Independent  IADL:  Independent    Living Environment  Social support: With family members   Type of home: House; 2-story   Stairs to enter the home: Yes       Ramp: No   Stairs inside the home:  Yes 7 Is there a railing: No     Help at home: None  Equipment owned:       Employment: Yes   Hobbies/Interests: Laura    Patient goals for therapy: Everything I normally do with my arm    Pain assessment:  see subjective report     Objective   SHOULDER EVALUATION  PAIN: see subjective report  INTEGUMENTARY (edema, incisions):   POSTURE:   GAIT:   Weightbearing Status:   Assistive Device(s):   Gait Deviations:   BALANCE/PROPRIOCEPTION:   WEIGHTBEARING ALIGNMENT:   ROM:   (Degrees) Left AROM Left PROM Right AROM  Right PROM   Shoulder Flexion  To 90 pain-free WNL    Shoulder Extension       Shoulder Abduction   WNL    Shoulder Adduction       Shoulder Internal Rotation   T7    Shoulder External Rotation  To 30 with slight discomfort WNL    Shoulder Horizontal Abduction       Shoulder Horizontal Adduction       Shoulder Flexion ER       Shoulder Flexion IR       Elbow Extension       Elbow Flexion       Pain:   End feel:     STRENGTH:   Pain: - none + mild ++ moderate +++ severe  Strength Scale: 0-5/5 Left Right   Shoulder Flexion     Shoulder Extension     Shoulder Abduction     Shoulder Adduction     Shoulder Internal Rotation     Shoulder External Rotation     Shoulder Horizontal Abduction     Shoulder Horizontal Adduction     Elbow Flexion 5- 5   Elbow Extension 4-, ++ (mod) 5   Mid Trap     Lower Trap     Rhomboid     Serratus Anterior      Strength WNL and symmetric WNL and symmetric     FLEXIBILITY:   SPECIAL TESTS:   PALPATION:   JOINT MOBILITY:   CERVICAL SCREEN:     Assessment & Plan   CLINICAL IMPRESSIONS  Medical Diagnosis: Other closed displaced fracture of proximal end of left humerus, initial encounter    Treatment Diagnosis: Right shoulder pain with decreased mobility and strength   Impression/Assessment: Patient is a 59 year old female with acute right shoulder pain and decreased strength and mobility complaints following a proximal humerus fracture secondary to fall on 9/27/2024.   The following significant findings have been identified: Pain, Decreased ROM/flexibility, Decreased strength, Impaired muscle performance, Decreased activity tolerance, and Impaired posture. These impairments interfere with their ability to perform self care tasks, recreational activities, and household chores as compared to previous level of function.     Clinical Decision Making (Complexity):  Clinical Presentation: Stable/Uncomplicated  Clinical Presentation Rationale: based on medical and personal factors listed in PT evaluation  Clinical Decision Making (Complexity): Low complexity    PLAN OF CARE  Treatment Interventions:  Modalities: Cryotherapy, Dry Needling, E-stim, Hot Pack  Interventions: Manual Therapy, Neuromuscular Re-education, Therapeutic Activity, Therapeutic Exercise, Self-Care/Home Management    Long Term Goals     PT Goal 1  Goal Identifier: HEP  Goal Description: Halina will demonstrate mastery of ([id number] need for cueing) and compliance with (completion at least 5/7 days/week) her home exercise program to facilitate increased rate of improvement.  Goal Progress: In progress  Target Date: 11/13/24  PT Goal 2  Goal Identifier: SPADI  Goal Description: Halina will demonstrate significantly improved function as evidenced by an improved (decreased) SPADI score of less than or equal to 30%.  Goal Progress: 56.25%  Target Date: 12/11/24  PT Goal 3  Goal Identifier: ADLs  Goal Description: Halina will demonstrate significantly improved function as evidenced by her ability to dress/undress without limitation/modification and self-report left shoulder pain no higher than 2/10.  Goal Progress: Unable  Target Date: 12/11/24      Frequency of Treatment: 1 time per week  Duration of Treatment: 10 weeks    Recommended Referrals to Other Professionals:  none  Education Assessment:   Learner/Method: Patient;Listening;Demonstration;Pictures/Video;No Barriers to Learning    Risks and benefits of  evaluation/treatment have been explained.   Patient/Family/caregiver agrees with Plan of Care.     Evaluation Time:     PT Jose, Low Complexity Minutes (75025): 21       Signing Clinician: Tae Lemus PT

## 2024-10-18 ENCOUNTER — TELEPHONE (OUTPATIENT)
Dept: ORTHOPEDICS | Facility: CLINIC | Age: 59
End: 2024-10-18
Payer: COMMERCIAL

## 2024-10-18 NOTE — TELEPHONE ENCOUNTER
Form or Letter   Type or form/letter needing completion: [place], [facility] is calling to ask about the status of this form which was sent on 10/15 at fax# [phone].  He is also asking was about work restrictions and work status and plan of care.    Provider: Dr. Castañeda  Date form needed: asap  Once completed: Fax form to: [facility] at 1 [phone], claim# [id number]    Could we send this information to you in [facility] or would you prefer to receive a phone call?:   Patient would prefer a phone call   Okay to leave a detailed message?: Yes at Other phone number:  [facility] ph# is 1 [phone] and refer to the claim# above.

## 2024-10-18 NOTE — TELEPHONE ENCOUNTER
Form was completed and faxed to desired location, copy sent to scan original placed in cabinet    Ihsan Crouch ATC

## 2024-10-23 ENCOUNTER — THERAPY VISIT (OUTPATIENT)
Dept: PHYSICAL THERAPY | Facility: REHABILITATION | Age: 59
End: 2024-10-23
Payer: COMMERCIAL

## 2024-10-23 DIAGNOSIS — S42.292A OTHER CLOSED DISPLACED FRACTURE OF PROXIMAL END OF LEFT HUMERUS, INITIAL ENCOUNTER: Primary | ICD-10-CM

## 2024-10-23 PROCEDURE — 97112 NEUROMUSCULAR REEDUCATION: CPT | Mod: GP

## 2024-10-23 PROCEDURE — 97140 MANUAL THERAPY 1/> REGIONS: CPT | Mod: GP

## 2024-10-23 PROCEDURE — 97110 THERAPEUTIC EXERCISES: CPT | Mod: GP

## 2024-10-30 ENCOUNTER — THERAPY VISIT (OUTPATIENT)
Dept: PHYSICAL THERAPY | Facility: REHABILITATION | Age: 59
End: 2024-10-30
Payer: COMMERCIAL

## 2024-10-30 DIAGNOSIS — S42.292A OTHER CLOSED DISPLACED FRACTURE OF PROXIMAL END OF LEFT HUMERUS, INITIAL ENCOUNTER: Primary | ICD-10-CM

## 2024-10-30 PROCEDURE — 97110 THERAPEUTIC EXERCISES: CPT | Mod: GP | Performed by: PHYSICAL THERAPIST

## 2024-11-07 ENCOUNTER — ANCILLARY PROCEDURE (OUTPATIENT)
Dept: GENERAL RADIOLOGY | Facility: CLINIC | Age: 59
End: 2024-11-07
Attending: STUDENT IN AN ORGANIZED HEALTH CARE EDUCATION/TRAINING PROGRAM
Payer: COMMERCIAL

## 2024-11-07 ENCOUNTER — OFFICE VISIT (OUTPATIENT)
Dept: ORTHOPEDICS | Facility: CLINIC | Age: 59
End: 2024-11-07
Payer: COMMERCIAL

## 2024-11-07 ENCOUNTER — THERAPY VISIT (OUTPATIENT)
Dept: PHYSICAL THERAPY | Facility: REHABILITATION | Age: 59
End: 2024-11-07
Payer: COMMERCIAL

## 2024-11-07 DIAGNOSIS — S42.292A OTHER CLOSED DISPLACED FRACTURE OF PROXIMAL END OF LEFT HUMERUS, INITIAL ENCOUNTER: ICD-10-CM

## 2024-11-07 DIAGNOSIS — S42.292A OTHER CLOSED DISPLACED FRACTURE OF PROXIMAL END OF LEFT HUMERUS, INITIAL ENCOUNTER: Primary | ICD-10-CM

## 2024-11-07 PROCEDURE — 99213 OFFICE O/P EST LOW 20 MIN: CPT | Performed by: STUDENT IN AN ORGANIZED HEALTH CARE EDUCATION/TRAINING PROGRAM

## 2024-11-07 PROCEDURE — 97110 THERAPEUTIC EXERCISES: CPT | Mod: GP | Performed by: PHYSICAL THERAPIST

## 2024-11-07 PROCEDURE — 73030 X-RAY EXAM OF SHOULDER: CPT | Mod: TC | Performed by: RADIOLOGY

## 2024-11-07 NOTE — PROGRESS NOTES
CC: Left greater tuberosity fracture follow-up    HPI:  patient is a 59-year-old female seen here today in follow-up 5 weeks status post a left greater tuberosity fracture.  For full history and physical please see my prior notes.  She has been doing physical therapy at [place] in [facility] for active and passive range of motion below the shoulder.  She has a 1 pound lifting restriction.  She is weaned from her sling.  She states that overall her shoulder is feeling better and better.    Objective:   PE:  LUE: No pain to palpation over the anterior, posterior, or lateral aspect of the shoulder.  Passive range of motion is 130 days of forward elevation, 120 degrees abduction, 60 degrees external rotation.  Active range of motion to 90 degrees of flexion and abduction limited by discomfort.  5/5L flexor sensor.  5/5 wrist flexion extension.  Sensation tact in axillary, radial, median, and ulnar nerve distribution    Imaging:   3 view x-ray of the left shoulder from today was reviewed.  Greater tuberosity fracture still visible.  No change in fracture alignment.  There is blurring and decreased visualization of the fracture line.  No callus noted at this time    A/P:  Patient is a 59-year-old female seen here today 5 weeks status post a left nondisplaced greater tuberosity fracture.  Overall she has been doing well.  Her pain is improving.  X-rays show stable alignment.  She is working on active and passive range of motion with physical therapy.  From my standpoint she can advance her PT.  I will not provide any active and passive range of motion restrictions.  She is still can have a 1 pound lifting restriction.  She can start gentle strengthening once she has achieved full active and passive range of motion.  Will extend her work restrictions to 1 pound lifting restriction without any active and passive range of motion restrictions for additional 6 weeks.  She will follow-up with me in 6 to 8 weeks for range of  motion check and final x-rays.    Marcin Castañeda MD    [facility]   Department of Orthopedic Surgery      Disclaimer: This note consists of symbols derived from keyboarding, dictation and/or voice recognition software. As a result, there may be errors in the script that have gone undetected. Please consider this when interpreting information found in this chart.

## 2024-11-07 NOTE — LETTER
11/7/2024      Halina Ibrahim  [address] [address]      Dear Colleague,    Thank you for referring your patient, Halina Ibrahim, to the [facility]. Please see a copy of my visit note below.    CC: Left greater tuberosity fracture follow-up    HPI:  patient is a 59-year-old female seen here today in follow-up 5 weeks status post a left greater tuberosity fracture.  For full history and physical please see my prior notes.  She has been doing physical therapy at [place] in [facility] for active and passive range of motion below the shoulder.  She has a 1 pound lifting restriction.  She is weaned from her sling.  She states that overall her shoulder is feeling better and better.    Objective:   PE:  LUE: No pain to palpation over the anterior, posterior, or lateral aspect of the shoulder.  Passive range of motion is 130 days of forward elevation, 120 degrees abduction, 60 degrees external rotation.  Active range of motion to 90 degrees of flexion and abduction limited by discomfort.  5/5L flexor sensor.  5/5 wrist flexion extension.  Sensation tact in axillary, radial, median, and ulnar nerve distribution    Imaging:   3 view x-ray of the left shoulder from today was reviewed.  Greater tuberosity fracture still visible.  No change in fracture alignment.  There is blurring and decreased visualization of the fracture line.  No callus noted at this time    A/P:  Patient is a 59-year-old female seen here today 5 weeks status post a left nondisplaced greater tuberosity fracture.  Overall she has been doing well.  Her pain is improving.  X-rays show stable alignment.  She is working on active and passive range of motion with physical therapy.  From my standpoint she can advance her PT.  I will not provide any active and passive range of motion restrictions.  She is still can have a 1 pound lifting restriction.  She can start gentle strengthening once she has achieved full  active and passive range of motion.  Will extend her work restrictions to 1 pound lifting restriction without any active and passive range of motion restrictions for additional 6 weeks.  She will follow-up with me in 6 to 8 weeks for range of motion check and final x-rays.    Marcin Castañeda MD    [facility]   Department of Orthopedic Surgery      Disclaimer: This note consists of symbols derived from keyboarding, dictation and/or voice recognition software. As a result, there may be errors in the script that have gone undetected. Please consider this when interpreting information found in this chart.       Again, thank you for allowing me to participate in the care of your patient.        Sincerely,        Marcin Castañeda MD

## 2024-11-07 NOTE — LETTER
November 7, 2024      Halina Ibrahim  [address] [address]        To Whom It May Concern:    Halina Ibrahim was seen in our clinic for her left shoulder. She may return to work with the following: limited to light duty - lifting no greater than 1 pound. No restrictions on her range of motion. These restriction will be in place for 6 weeks until she follows up in clinic fo re-evaluation.      Sincerely,      Marcin Castañeda

## 2024-11-13 ENCOUNTER — THERAPY VISIT (OUTPATIENT)
Dept: PHYSICAL THERAPY | Facility: REHABILITATION | Age: 59
End: 2024-11-13
Payer: COMMERCIAL

## 2024-11-13 DIAGNOSIS — S42.292A OTHER CLOSED DISPLACED FRACTURE OF PROXIMAL END OF LEFT HUMERUS, INITIAL ENCOUNTER: Primary | ICD-10-CM

## 2024-11-13 DIAGNOSIS — M62.81 GENERALIZED MUSCLE WEAKNESS: ICD-10-CM

## 2024-11-13 PROCEDURE — 97110 THERAPEUTIC EXERCISES: CPT | Mod: GP | Performed by: PHYSICAL THERAPY ASSISTANT

## 2024-11-20 ENCOUNTER — THERAPY VISIT (OUTPATIENT)
Dept: PHYSICAL THERAPY | Facility: REHABILITATION | Age: 59
End: 2024-11-20
Payer: COMMERCIAL

## 2024-11-20 DIAGNOSIS — S42.292A OTHER CLOSED DISPLACED FRACTURE OF PROXIMAL END OF LEFT HUMERUS, INITIAL ENCOUNTER: Primary | ICD-10-CM

## 2024-11-20 PROCEDURE — 97110 THERAPEUTIC EXERCISES: CPT | Mod: GP

## 2024-11-27 ENCOUNTER — THERAPY VISIT (OUTPATIENT)
Dept: PHYSICAL THERAPY | Facility: REHABILITATION | Age: 59
End: 2024-11-27
Payer: COMMERCIAL

## 2024-11-27 DIAGNOSIS — S42.292A OTHER CLOSED DISPLACED FRACTURE OF PROXIMAL END OF LEFT HUMERUS, INITIAL ENCOUNTER: Primary | ICD-10-CM

## 2024-11-27 PROCEDURE — 97110 THERAPEUTIC EXERCISES: CPT | Mod: GP

## 2024-12-04 ENCOUNTER — THERAPY VISIT (OUTPATIENT)
Dept: PHYSICAL THERAPY | Facility: REHABILITATION | Age: 59
End: 2024-12-04
Payer: COMMERCIAL

## 2024-12-04 DIAGNOSIS — S42.292A OTHER CLOSED DISPLACED FRACTURE OF PROXIMAL END OF LEFT HUMERUS, INITIAL ENCOUNTER: Primary | ICD-10-CM

## 2024-12-04 PROCEDURE — 97110 THERAPEUTIC EXERCISES: CPT | Mod: GP

## 2024-12-07 DIAGNOSIS — E66.01 MORBID OBESITY WITH BMI OF 40.0-44.9, ADULT (H): ICD-10-CM

## 2024-12-07 RX ORDER — SEMAGLUTIDE 1 MG/.5ML
1 INJECTION, SOLUTION SUBCUTANEOUS WEEKLY
Qty: 2 ML | Refills: 0 | Status: SHIPPED | OUTPATIENT
Start: 2024-12-07

## 2024-12-11 ENCOUNTER — THERAPY VISIT (OUTPATIENT)
Dept: PHYSICAL THERAPY | Facility: REHABILITATION | Age: 59
End: 2024-12-11
Payer: COMMERCIAL

## 2024-12-11 DIAGNOSIS — S42.292A OTHER CLOSED DISPLACED FRACTURE OF PROXIMAL END OF LEFT HUMERUS, INITIAL ENCOUNTER: Primary | ICD-10-CM

## 2024-12-11 PROCEDURE — 97110 THERAPEUTIC EXERCISES: CPT | Mod: GP

## 2024-12-30 ENCOUNTER — HOSPITAL ENCOUNTER (OUTPATIENT)
Dept: CARDIOLOGY | Facility: HOSPITAL | Age: 59
Discharge: HOME | End: 2024-12-30
Attending: INTERNAL MEDICINE | Admitting: INTERNAL MEDICINE
Payer: COMMERCIAL

## 2024-12-30 DIAGNOSIS — Z95.2 S/P AVR (AORTIC VALVE REPLACEMENT): ICD-10-CM

## 2024-12-30 DIAGNOSIS — Z95.2 S/P MVR (MITRAL VALVE REPLACEMENT): ICD-10-CM

## 2024-12-30 LAB — LVEF ECHO: NORMAL

## 2024-12-30 PROCEDURE — 93306 TTE W/DOPPLER COMPLETE: CPT | Mod: 26 | Performed by: INTERNAL MEDICINE

## 2024-12-30 PROCEDURE — 93306 TTE W/DOPPLER COMPLETE: CPT

## 2025-01-02 ENCOUNTER — OFFICE VISIT (OUTPATIENT)
Dept: ORTHOPEDICS | Facility: CLINIC | Age: 60
End: 2025-01-02
Payer: COMMERCIAL

## 2025-01-02 VITALS — BODY MASS INDEX: 42.59 KG/M2 | HEIGHT: 66 IN | WEIGHT: 265 LBS

## 2025-01-02 DIAGNOSIS — S42.292A OTHER CLOSED DISPLACED FRACTURE OF PROXIMAL END OF LEFT HUMERUS, INITIAL ENCOUNTER: Primary | ICD-10-CM

## 2025-01-02 NOTE — PROGRESS NOTES
CC: 3-month status post left greater tuberosity fracture    HPI: Patient is a 59-year-old female seen today 3 months status post left greater tuberosity fracture.  Overall she continues to improve.  She has been doing physical therapy at our [place] location.  She is making significant progress in range of motion.  She still notes deficits in terminal abduction and internal rotation.  She gets occasional soreness in her shoulder.  It still feels slightly stiff.  However she feels her shoulder is not holding her back from doing any of her daily activities or work-related activities.    Objective:   PE:  LUE: No open wounds or lacerations noted.  No pain to palpation of the clavicle, AC joint, or acromion.  No pain to palpation of the anterior, lateral, or posterior joint line.  Passive range of motion is 160 degrees of forward elevation, 150 degrees abduction, 60 degrees external rotation with soft endpoint.  Active range of motion is 150 degrees of forward elevation.  5/5 strength in flexion, abduction, internal and external rotation.    Imaging:   Three-view x-ray left shoulder from today was reviewed.  This shows well-maintained alignment of the very tuberosity fracture with decreased visibility of the fracture line suggest interval healing.    A/P:  Patient is a 59-year-old female seen here today in follow-up 3-month status post a left greater tuberosity fracture.  By x-ray and exam she is going on to union.  She is still working on terminal range of motion and strengthening.  At this time point she can return to all desired activities as symptoms allow without restriction.  We discussed continued physical therapy.  Given her range of motion, I think would be reasonable to continue with home exercise program.  However if she feels she is getting benefit from physical therapy and more than happy to prescribe this for.  She feels comfortable with her home exercise program.  She can follow-up with me as needed for  any orthopedic concerns in the future.    Marcin Castañeda MD    [facility]   Department of Orthopedic Surgery      Disclaimer: This note consists of symbols derived from keyboarding, dictation and/or voice recognition software. As a result, there may be errors in the script that have gone undetected. Please consider this when interpreting information found in this chart.

## 2025-01-02 NOTE — LETTER
1/2/2025      Halina Ibrahim  [address] [address]      Dear Colleague,    Thank you for referring your patient, Halina Ibrahim, to the [facility]. Please see a copy of my visit note below.    CC: 3-month status post left greater tuberosity fracture    HPI: Patient is a 59-year-old female seen today 3 months status post left greater tuberosity fracture.  Overall she continues to improve.  She has been doing physical therapy at our [place] location.  She is making significant progress in range of motion.  She still notes deficits in terminal abduction and internal rotation.  She gets occasional soreness in her shoulder.  It still feels slightly stiff.  However she feels her shoulder is not holding her back from doing any of her daily activities or work-related activities.    Objective:   PE:  LUE: No open wounds or lacerations noted.  No pain to palpation of the clavicle, AC joint, or acromion.  No pain to palpation of the anterior, lateral, or posterior joint line.  Passive range of motion is 160 degrees of forward elevation, 150 degrees abduction, 60 degrees external rotation with soft endpoint.  Active range of motion is 150 degrees of forward elevation.  5/5 strength in flexion, abduction, internal and external rotation.    Imaging:   Three-view x-ray left shoulder from today was reviewed.  This shows well-maintained alignment of the very tuberosity fracture with decreased visibility of the fracture line suggest interval healing.    A/P:  Patient is a 59-year-old female seen here today in follow-up 3-month status post a left greater tuberosity fracture.  By x-ray and exam she is going on to union.  She is still working on terminal range of motion and strengthening.  At this time point she can return to all desired activities as symptoms allow without restriction.  We discussed continued physical therapy.  Given her range of motion, I think would be reasonable to  continue with home exercise program.  However if she feels she is getting benefit from physical therapy and more than happy to prescribe this for.  She feels comfortable with her home exercise program.  She can follow-up with me as needed for any orthopedic concerns in the future.    Marcin Castañeda MD    [facility]   Department of Orthopedic Surgery      Disclaimer: This note consists of symbols derived from keyboarding, dictation and/or voice recognition software. As a result, there may be errors in the script that have gone undetected. Please consider this when interpreting information found in this chart.         Again, thank you for allowing me to participate in the care of your patient.        Sincerely,        Marcin Castañeda MD    Electronically signed

## 2025-01-13 NOTE — PROGRESS NOTES
From: Ratna Cox  To: Robby King  Sent: 3/22/2023 10:12 AM CDT  Subject: My naval absess    Dr. King, you asked me to check-in to see the progress or lack of such on my naval absess. It does not seem to be increasing in size but it is also not seemingly decreasing. It is staying pretty deep into the naval but is draining a little bit (not \"exploding\" like my last one did many years ago). I have enough antibiotic pills to get me through tomorrow morning (Thursday). If you want me to continue with that Rx, I'll need a refill. If you want me to try something else, just let me know when you place the order with [facility].  Marielos   Halina Ibrahim is a 59 year old who is being evaluated via a billable video visit.      How would you like to obtain your AVS? MyChart  If the video visit is dropped, the invitation should be resent by: Text to cell phone: [phone]  Will anyone else be joining your video visit? No  {If patient encounters technical issues they should call [phone]      Post-op Surgical Weight Loss Diet Evaluation     Assessment:  Pt presents for  20 years  post-op RD visit, s/p RNY in 02/2004. Today we reviewed current eating habits and level of physical activity, and instructed on the changes that are required for successful bariatric outcomes.    Weight loss medication: Wegovy - has been stable at 1 mg    Patient Progress: Patient stated that she only has one bagel per day - cut back from two/day. Discussed adding in a protein source with breakfast - eggs not an options, plain cottage cheese not an option - likes it with pineapple, yogurt - likes it in phases - provided recipes. Discussed adding in more structured exercise.     Initial weight: 296 lbs  Current weight: 265 lbs (1/2/2025)  Weight change: 31 lbs, 10.5% weight loss    BMI: 42.77    Patient Active Problem List   Diagnosis    Aortic valve disorder    Mitral valve replaced    Restless leg syndrome    Morbid obesity (H)    History of cholecystectomy    closed displaced fracture of proximal end of left humerus, initial encounter  DOI: 09/27/24    Hx of gastric bypass 09/14/2004    Chronic anticoagulation- warfarin due to cardiac valves replaced    TIA (transient ischemic attack) (H)  03/04/2010   Diabetes: no, A1C of 5.5% on 9/6/2024    *patient will go through phases with eating fruit  *no protein shakes  Diet Recall/Time:   Breakfast: bagel (cinnamon raisin) with cream cheese  Lunch: leftovers  Dinner: protein (chicken) Or Sánchez Tejinder breakfast Bowl or sandwich with turkey OR tuna packets and crackers    Typical Snacks: tuna and crackers, crackers and  "cheese    Fluid-meal separation:  Fluids are not  30min before and 30 minutes after meals - patient commonly will have water with meals (a couple of sips).    Fluid Intake  Water: 56+ ounces  Carbonation: Pepsi Zero - 1 can/day    Exercise:   No set routine at this time    Patient has two dogs and with busy taking care of them    Patient broke her shoulder in September 2024 - was doing physical therapy and will do arm exercises at home    Patient has had issues with her left hip joint - very painful - does have leg exercises    PES statement:      (NC-1.4) Altered GI Function related to Alteration in gastrointestinal tract structure and/or function/ Decreased functional length of the GI tract as evidenced by Weight loss of 10.5% initial body weight; Gastric bypass surgery    Intervention    Discussion  Recommended to consume 15-20gm protein at 3 meals daily, along with protein supplement/\"planned protein containing snack\" of 15-30gm protein, to reach goal of 60-80 gm protein daily.  Educated on post-op vitamin regimen: Multi Vit + iron 2x/day, calcium citrate 400-600 mg 2x/day, 2266-8347 mcg of Sublingual B-12 daily, and 5000 IU Vitamin D3 daily (MVI and calcium can be taken at the same time BID)  Reviewed lean protein sources  Bariatric Plate Method-  including lean/low fat protein at each meal, including a vegetable/fruit, and limiting carbohydrate intake to less than 25% of plate volume.     Instructions  Include 15-20gm protein at each meal, along with protein supplement/\"planned protein containing snack\" of 15-30gm protein, to reach goal of 60-80 gm protein daily.  Increase fluid intake to 64oz daily: choose plain or calorie/carbonation-free beverages.  Incorporate daily structured activity, 30-60 minutes most days of the week  Recommended pt to start taking: Multi Vit + iron 2x/day, calcium citrate 400-600 mg 2x/day, 4444-9132 mcg of Sublingual B-12 daily, and 5000 IU Vitamin D3 daily. (MVI and " calcium can be taken at the same time)  Read food labels more consistently: keeping total fat grams <10, total sugar grams <10, fiber >3gm per serving.  Increase vegetable/fruit intake, by having a vegetable or fruit with each meal daily.  Practice plate method: 1/2 plate lean/low fat protein source, vegetable/fruit, <25% of plate complex carbohydrates.  Separate fluids 30 minutes before/after meal times.  Practice eating off of smaller plates/bowls, chewing to applesauce consistency, taking 20-30 minutes to eat in a calm/relaxed environment without distractions of tv/email/cell phone.  Goals:  Aim to have a protein source with breakfast  Try prepping breakfast options  Try creating a routine with movement    Handouts provided:  Recipes  Workout options    Assessment/Plan:    Pt to follow up in <1 month bariatrician and 2 month(s) with dietitian    Video-Visit Details    Type of service:  Video Visit    Video Start Time (time video started):  1:59 PM    Video End Time (time video stopped):  2:24 PM    Originating Location (pt. Location): Home      Distant Location (provider location):  Off-site    Mode of Communication:  Video Conference via [facility]    Physician has received verbal consent for a Video Visit from the patient? Yes    Wanda Temple RD

## 2025-01-14 ENCOUNTER — VIRTUAL VISIT (OUTPATIENT)
Dept: SURGERY | Facility: CLINIC | Age: 60
End: 2025-01-14
Payer: COMMERCIAL

## 2025-01-14 DIAGNOSIS — K90.9 INTESTINAL MALABSORPTION, UNSPECIFIED TYPE: ICD-10-CM

## 2025-01-14 DIAGNOSIS — Z71.3 NUTRITIONAL COUNSELING: ICD-10-CM

## 2025-01-14 DIAGNOSIS — Z98.84 S/P GASTRIC BYPASS: ICD-10-CM

## 2025-01-14 DIAGNOSIS — E66.01 MORBID OBESITY WITH BMI OF 40.0-44.9, ADULT (H): Primary | ICD-10-CM

## 2025-01-14 PROCEDURE — 98005 SYNCH AUDIO-VIDEO EST LOW 20: CPT | Performed by: DIETITIAN, REGISTERED

## 2025-01-14 PROCEDURE — 97803 MED NUTRITION INDIV SUBSEQ: CPT | Mod: 95 | Performed by: DIETITIAN, REGISTERED

## 2025-01-14 NOTE — PATIENT INSTRUCTIONS
Recipes:  [email or web address]    [email or web address]    [email or web address]    [email or web address]      Bariatric Blog Resources  Bariatric Recipes  Bariatric Recipes High Protein (bariatricmealprep.com)     Bariatric Foodie - Helping the WLS community play with their food!     Bariatric Recipes -- My Bariatric Dietitian        Low-Impact Workout Videos (click on title for link)  Improved Health - effective low impact workouts (gentle, beginner and intermediate level workouts)  Senior Shape with Victoria -  easy to follow, low impact exercises for beginners  Vitality Life with Otto Alvarez - exercises are completed seated, standing or a combination of both making the exercise just right for them  Raegan's Fit Pilates -  easy to follow Pilates, Strength & Cardio workouts - look for wall pilates workouts here!  Nourish.Move.Love - quick + effective home workouts that are 10-30 minutes  Body Project - low impact workout videos that includes HIIT cardio, resistance training, pilates and yoga  Christiano  - low impact workout videos  Walk at Home - low impact, easy to follow movements to walk in place  [email or web address] - easy to follow, seated or standing workouts for beginners!  Chair Fit Camp - full-body workout from your chair     Additional exercises:    1.     Learning About Being Physically Active  or  Fitness: Getting and Staying Active     2.      Muscle Conditionin Exercises    3.     Resistance Training with Free Weights: 3 Exercises or  Resistance Training with Surgical Tubin Exercises    4.     Stretchin Exercises    5.     Seated Exercises for Arms and Legs: 11 Exercises    6.       Walking for Exercise: Care Instructions        Exercises for Relaxing    Mindfulness Meditation     Conscious Breathing      American Heart  Association Recommendations    American Heart Association Exercise Recommendations    1.      Get at least 150 minutes per week of moderate-intensity aerobic activity or 75 minutes per week of vigorous aerobic activity, or a combination of both, preferably spread throughout the week.    2.      Add moderate- to high-intensity muscle-strengthening activity (such as resistance or weights) on at least 2 days per week.    3.     Spend less time sitting. Even light-intensity activity can offset some of the risks of being sedentary.    4.     Gain even more benefits by being active at least 300 minutes (5 hours) per week.    5.     Increase amount and intensity gradually over time.  https://www.heart.org/en/healthy-living/fitness/fitness-basics/aha-recs-for-physical-activity-in-adults

## 2025-01-14 NOTE — LETTER
1/14/2025      Halina Ibrahim  [address] [address]      Dear Colleague,    Thank you for referring your patient, Halina Ibrahim, to the [facility] [place]. Please see a copy of my visit note below.    Halina Ibrahim is a 59 year old who is being evaluated via a billable video visit.      How would you like to obtain your AVS? MyChart  If the video visit is dropped, the invitation should be resent by: Text to cell phone: [phone]  Will anyone else be joining your video visit? No  {If patient encounters technical issues they should call [phone]      Post-op Surgical Weight Loss Diet Evaluation     Assessment:  Pt presents for  20 years  post-op RD visit, s/p RNY in 02/2004. Today we reviewed current eating habits and level of physical activity, and instructed on the changes that are required for successful bariatric outcomes.    Weight loss medication: Wegovy - has been stable at 1 mg    Patient Progress: Patient stated that she only has one bagel per day - cut back from two/day. Discussed adding in a protein source with breakfast - eggs not an options, plain cottage cheese not an option - likes it with pineapple, yogurt - likes it in phases - provided recipes. Discussed adding in more structured exercise.     Initial weight: 296 lbs  Current weight: 265 lbs (1/2/2025)  Weight change: 31 lbs, 10.5% weight loss    BMI: 42.77    Patient Active Problem List   Diagnosis     Aortic valve disorder     Mitral valve replaced     Restless leg syndrome     Morbid obesity (H)     History of cholecystectomy     closed displaced fracture of proximal end of left humerus, initial encounter  DOI: 09/27/24     Hx of gastric bypass 09/14/2004     Chronic anticoagulation- warfarin due to cardiac valves replaced     TIA (transient ischemic attack) (H)  03/04/2010   Diabetes: no, A1C of 5.5% on 9/6/2024    *patient will go through phases with eating fruit  *no  "protein shakes  Diet Recall/Time:   Breakfast: bagel (cinnamon raisin) with cream cheese  Lunch: leftovers  Dinner: protein (chicken) Or Sánchez Tejinder breakfast Bowl or sandwich with turkey OR tuna packets and crackers    Typical Snacks: tuna and crackers, crackers and cheese    Fluid-meal separation:  Fluids are not  30min before and 30 minutes after meals - patient commonly will have water with meals (a couple of sips).    Fluid Intake  Water: 56+ ounces  Carbonation: Pepsi Zero - 1 can/day    Exercise:   No set routine at this time    Patient has two dogs and with busy taking care of them    Patient broke her shoulder in September 2024 - was doing physical therapy and will do arm exercises at home    Patient has had issues with her left hip joint - very painful - does have leg exercises    PES statement:      (NC-1.4) Altered GI Function related to Alteration in gastrointestinal tract structure and/or function/ Decreased functional length of the GI tract as evidenced by Weight loss of 10.5% initial body weight; Gastric bypass surgery    Intervention    Discussion  Recommended to consume 15-20gm protein at 3 meals daily, along with protein supplement/\"planned protein containing snack\" of 15-30gm protein, to reach goal of 60-80 gm protein daily.  Educated on post-op vitamin regimen: Multi Vit + iron 2x/day, calcium citrate 400-600 mg 2x/day, 0771-5930 mcg of Sublingual B-12 daily, and 5000 IU Vitamin D3 daily (MVI and calcium can be taken at the same time BID)  Reviewed lean protein sources  Bariatric Plate Method-  including lean/low fat protein at each meal, including a vegetable/fruit, and limiting carbohydrate intake to less than 25% of plate volume.     Instructions  Include 15-20gm protein at each meal, along with protein supplement/\"planned protein containing snack\" of 15-30gm protein, to reach goal of 60-80 gm protein daily.  Increase fluid intake to 64oz daily: choose plain or " calorie/carbonation-free beverages.  Incorporate daily structured activity, 30-60 minutes most days of the week  Recommended pt to start taking: Multi Vit + iron 2x/day, calcium citrate 400-600 mg 2x/day, 1959-2347 mcg of Sublingual B-12 daily, and 5000 IU Vitamin D3 daily. (MVI and calcium can be taken at the same time)  Read food labels more consistently: keeping total fat grams <10, total sugar grams <10, fiber >3gm per serving.  Increase vegetable/fruit intake, by having a vegetable or fruit with each meal daily.  Practice plate method: 1/2 plate lean/low fat protein source, vegetable/fruit, <25% of plate complex carbohydrates.  Separate fluids 30 minutes before/after meal times.  Practice eating off of smaller plates/bowls, chewing to applesauce consistency, taking 20-30 minutes to eat in a calm/relaxed environment without distractions of tv/email/cell phone.  Goals:  Aim to have a protein source with breakfast  Try prepping breakfast options  Try creating a routine with movement    Handouts provided:  Recipes  Workout options    Assessment/Plan:    Pt to follow up in <1 month bariatrician and 2 month(s) with dietitian    Video-Visit Details    Type of service:  Video Visit    Video Start Time (time video started):  1:59 PM    Video End Time (time video stopped):  2:24 PM    Originating Location (pt. Location): Home    {PROVIDER LOCATION On-site should be selected for visits conducted from your clinic location or adjoining [facility] hospital, academic office, or other nearby [facility] building. Off-site should be selected for all other provider locations, including home:[id number]}  Distant Location (provider location):  Off-site    Mode of Communication:  Video Conference via [facility]    Physician has received verbal consent for a Video Visit from the patient? Yes    Wanda Temple RD             Again, thank you for allowing me to participate in the care of your patient.        Sincerely,        Wanda Temple  DAVID    Electronically signed

## 2025-01-20 NOTE — PROGRESS NOTES
HEART CARE ENCOUNTER NOTE      [facility]  [phone]      Assessment/Recommendations   Assessment:   Valvular heart disease: Status post left atrial myxoma resection with subsequent mechanical mitral valve replacement in 2010 as well as mechanical aortic valve replacement in 2013.  Echocardiogram 12/30/2024 showed normal mechanical prosthesis of both mitral and aortic valve. On warfarin for anticoagulation.   Mild nonischemic cardiomyopathy: LVEF previously 45 to 50%.  More recent echocardiogram 12/30/2024 showed normalization 55 to 60%.  Patient remains on metoprolol and losartan.  Appears euvolemic on exam.  Denies shortness of breath, lower extremity edema.  Hypertension: Well controlled on metoprolol 25 mg daily, losartan 100 mg daily  History of left atrial myxoma: Status post resection in 2010 with subsequent mitral valve replacement.  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 Cagle     History of Present Illness/Subjective    HPI: Halina Ibrahim is a 59 year old female with PMHx of valvular heart disease status post mechanical MVR mechanical AVR in 2010 and 2013, embolic CVA in 2010, Hashimoto's thyroiditis, gastric bypass, MOLLY on CPAP, hypertension presents for follow-up.  In 2010 patient had embolic CVA and during this workup discovered left atrial myxoma with attachment to the mitral valve annulus.  Patient underwent mechanical mitral valve replacement in 2010 as well as mechanical aortic valve replacement due to severe aortic regurgitation in 2013.  Patient last saw Dr. Cgale 7/26/2024 where echocardiogram had shown mild cardiomyopathy with LVEF 45 to 50%.  Patient underwent CT coronary angiogram 9/3/2024 that showed normal coronary arteries with normal-appearing aortic and mitral mechanical  valves and no evidence of mass in the left atrium status post myxoma resection.  More recent echocardiogram 12/30/2024 showed normalization of EF 55 to 60% with normal gradients of mitral and aortic mechanical valves.    Patient has she has been feeling well denies any major cardiac concerns.  Continues to note fatigue but feels this is related to her Hashimoto's.  Patient notes she is fairly sedentary but is able to do chores, climb stairs, go to the grocery without any issue.  Denies exertional angina or dyspnea.  Denies palpitations, lightheadedness, lower extremity edema.  Patient notes not doing any major structured activity.          Echocardiogram 12/30/24 Results:  Left ventricular function is normal.The ejection fraction is 55-60%.  There is mild concentric left ventricular hypertrophy.  Normal right ventricle size and systolic function.  There is a bi-leaflet ([employer] [employer]) mechanical prosthesis.  Normal prosthetic mitral valve gradients.  There is a bi-leaflet ([employer] [place]) aortic mechanical prosthesis.  The gradient is normal for this prosthetic aortic valve.    CT coronary angiogram 9/3/2024  Angiography: right dominant coronary anatomy  LM normal  LAD normal  Circ normal  RCA normal     Non coronary:  Normal LV size, function and no WMA  No mass/thrombus in KATHI, LV or PA  Normal aortic  mechanical valve but due to limited views during cardiac cycle can not comment on function  Normal appearing  mitral mechanical valve by appearance, but due to limited views during cardiac cycle can not comment on function  Normal aorta  Normal pericardium     Imp  Normal coronary artery anatomy  Normal appearing aortic and mitral mechanical valves - suggest fluoroscopy to screen for function - eg complete opening and closing of leaflets.   No evidence of mass in left atrium (s/p myxoma resection in past)    Echocardiogram 2/21/2024  The left ventricle is normal in size with normal left ventricular wall  thickness. Left  "ventricular function is decreased. The ejection fraction is  45-50% (mildly reduced).  The right ventricle is normal size with mildly decreased right ventricular  systolic function  There is a bi-leaflet ([employer] [place]) mechanical prosthesis. Normal prosthetic  mitral valve gradients with a mean gradient of 5 mmHg.  There is a bi-leaflet ([employer] [place]) aortic mechanical prosthesis. The peak and  mean prosthetic valve gradients are elevated at 26 and 19 mmHg, respectively).     Compared to the prior study of 2018 the mean mitral gradient has decreased.     Physical Examination  Review of Systems   Vitals: /64 (BP Location: Right arm, Patient Position: Sitting, Cuff Size: Adult Large)   Pulse 66   Resp 17   Ht 1.676 m (5' 6\")   Wt 116.1 kg (256 lb)   BMI 41.32 kg/m    BMI= Body mass index is 41.32 kg/m .  Wt Readings from Last 3 Encounters:   01/21/25 116.1 kg (256 lb)   01/02/25 120.2 kg (265 lb)   09/03/24 121.1 kg (267 lb)           ENT/Mouth: membranes moist, no oral lesions or bleeding gums.      EYES:  no scleral icterus, normal conjunctivae                    Neck: No carotid bruit   Chest/Lungs:   lungs are clear to auscultation, no rales or wheezing,  equal chest wall expansion    Cardiovascular:   Regular.  Mechanical heart sounds with no murmurs, rubs, or gallops; the carotid, radial and posterior tibial pulses are intact,  no edema bilaterally        Extremities: no cyanosis or clubbing   Skin: no xanthelasma, warm.    Neurologic: no tremors     Psychiatric: alert and oriented x3, calm        Please refer above for cardiac ROS details.        Medical History  Surgical History Family History Social History   Past Medical History:   Diagnosis Date    Aortic regurgitation     Aortic valve replaced     Degenerative disc disease     Dyslipidemia     Hashimoto's disease     History of Sabrina-en-Y gastric bypass 9/14/2004    Dr. Macario Highest weight 296#    Hypertension     Low back pain     Metabolic syndrome  "    Mitral valve replaced     Morbid obesity (H)     Morbid obesity with BMI of 40.0-44.9, adult (H)     Other and unspecified postsurgical nonabsorption     S/P gastric bypass     S/P MVR (mitral valve replacement)     Stroke (H)     Stroke (H)     from mass near mitral valve     Past Surgical History:   Procedure Laterality Date    AORTIC VALVE REPLACEMENT      OTHER SURGICAL HISTORY      uterine ablation    REDO STERNOTOMY REPLACE VALVE AORTIC  3/14/2013    Procedure: REDO STERNOTOMY REPLACE VALVE AORTIC;  Redo-Median  Sternotomy, Aortic root enlargement . aortic Valve  Replacement  on pump oxygenator;  Surgeon: Buck Paula MD;  Location: [facility] OR    REPLACE VALVE MITRAL      REVISION NANY-EN-Y      s/p gastric bypass[      s/p mitral valve replacement[      TUBAL LIGATION       Family History   Problem Relation Age of Onset    Cancer Mother     Heart Disease Father     Heart Disease Sister     Obesity Sister     Cancer Maternal Grandmother         Social History     Socioeconomic History    Marital status: Single     Spouse name: Not on file    Number of children: Not on file    Years of education: Not on file    Highest education level: Not on file   Occupational History    Not on file   Tobacco Use    Smoking status: Former     Current packs/day: 0.00     Types: Cigarettes     Quit date: 3/17/2010     Years since quittin.8    Smokeless tobacco: Never   Vaping Use    Vaping status: Never Used   Substance and Sexual Activity    Alcohol use: No    Drug use: No    Sexual activity: Never     Birth control/protection: Surgical   Other Topics Concern    Parent/sibling w/ CABG, MI or angioplasty before 65F 55M? Not Asked   Social History Narrative    Not on file     Social Drivers of Health     Financial Resource Strain: Low Risk  (10/30/2023)    [facility]    Financial Resource Strain     Difficulty of Paying  Living Expenses: 3     Difficulty of Paying Living Expenses: Not on file   Food Insecurity: No Food Insecurity (10/30/2023)    Received from [employer] [facility]    Food Insecurity     Do you worry your food will run out before you are able to buy more?: 1   Transportation Needs: No Transportation Needs (10/30/2023)    Received from [facility] [employer] [facility]    Transportation Needs     Does lack of transportation keep you from medical appointments?: 1     Does lack of transportation keep you from work, meetings or getting things that you need?: 1   Physical Activity: Not on file   Stress: Not on file (2/11/2021)   Social Connections: Socially Integrated (10/30/2023)    Received from [facility] [employer] [facility] [employer] [facility]    Social Connections     Do you often feel lonely or isolated from those around you?: 0   Interpersonal Safety: Low Risk  (4/13/2021)    Received from [facility], [facility]    Intimate Partner Violence     Insults You: Not on file     Threatens You: Not on file     Screams at You: Not on file     Physically Hurt: Not on file     Intimate Partner Violence Score: Not on file   Housing Stability: Low Risk  (10/30/2023)    Received from [employer] [facility]    Housing Stability     What is your housing situation today?: 1           Medications  Allergies   Current Outpatient Medications   Medication Sig Dispense Refill    B Complex-C (SUPER B COMPLEX) TABS Take 1 BID      BIOTIN PO Take 10,000 mcg by mouth daily      Calcium 600 MG tablet Take 1 tablet by mouth 2 times daily.      citalopram (CELEXA) 10 MG tablet Take 10 mg by mouth daily      clindamycin (CLEOCIN) 300 MG capsule TAKE 2 CAPSULES 1 HOUR PRIOR TO DENTAL APPOINTMENTS.      clobetasol (TEMOVATE) 0.05 % external ointment Apply topically as needed      cyclobenzaprine (FLEXERIL) 5 MG tablet Take 1 tablet (5 mg) by mouth 3 times daily as needed for muscle  "spasms. 20 tablet 0    gabapentin (NEURONTIN) 100 MG capsule TAKE  1 CAPSULE IN ADDITION  MG PRN.      gabapentin (NEURONTIN) 300 MG capsule Take 3 capsules by mouth at bedtime      levothyroxine (SYNTHROID/LEVOTHROID) 175 MCG tablet       losartan (COZAAR) 100 MG tablet Take 100 mg by mouth      metoprolol succinate ER (TOPROL XL) 25 MG 24 hr tablet TAKE ONE TABLET BY MOUTH EVERY DAY 90 tablet 2    morphine (MS CONTIN) 15 MG CR tablet Take 15 mg by mouth every 12 hours      Multiple Vitamin (DAILY MULTIVITAMIN PO) Take 1 twice a day      naloxone (NARCAN) 4 MG/0.1ML nasal spray Spray 1 spray (4 mg) into one nostril alternating nostrils as needed for opioid reversal. every 2-3 minutes until assistance arrives 2 each 11    Semaglutide-Weight Management (WEGOVY) 1 MG/0.5ML pen Inject 1 mg subcutaneously once a week. 2 mL 0    tretinoin (RETIN-A) 0.05 % external cream Apply topically at bedtime      vitamin D3 (CHOLECALCIFEROL) 125 MCG (5000 UT) tablet Take 5,000 Units by mouth      warfarin (COUMADIN) 2.5 MG tablet Take 2 tablets by mouth daily. 90 tablet 4       Allergies   Allergen Reactions    Tramadol Nausea     No relief and \"funny feeling\" after, not really effective      Trazodone Other (See Comments)     Made restless legs worse    Pcn [Penicillins] Hives          Lab Results    Chemistry/lipid CBC Cardiac Enzymes/BNP/TSH/INR   Recent Labs   Lab Test 09/06/24  0725   CHOL 163   HDL 46*   LDL 88   TRIG 144     Recent Labs   Lab Test 09/06/24  0725 09/12/23  0720 08/30/22  0734   LDL 88 91 104*     Recent Labs   Lab Test 09/06/24  0725      POTASSIUM 4.4   CHLORIDE 107   CO2 25   GLC 99   BUN 12.9   CR 1.04*   GFRESTIMATED 62   TAMIKA 8.7*     Recent Labs   Lab Test 09/06/24  0725 09/03/24  0853 01/15/24  1522   CR 1.04* 1.1 0.92     Recent Labs   Lab Test 09/06/24  0725 09/12/23  0720 08/30/22  0734   A1C 5.5 5.5 5.5          Recent Labs   Lab Test 09/06/24  0725   WBC 8.3   HGB 12.3   HCT 38.3   MCV " "85        Recent Labs   Lab Test 09/06/24  0725 09/12/23  0720 08/30/22  0734   HGB 12.3 12.8 12.7    No results for input(s): \"TROPONINI\" in the last 85256 hours.  No results for input(s): \"BNP\", \"NTBNPI\", \"NTBNP\" in the last 98801 hours.  No results for input(s): \"TSH\" in the last 34294 hours.  Recent Labs   Lab Test 09/20/23  1546   INR 3.14*          Johanna Perez PA-C                                       "

## 2025-01-21 ENCOUNTER — OFFICE VISIT (OUTPATIENT)
Dept: CARDIOLOGY | Facility: CLINIC | Age: 60
End: 2025-01-21
Payer: COMMERCIAL

## 2025-01-21 VITALS
RESPIRATION RATE: 17 BRPM | BODY MASS INDEX: 41.14 KG/M2 | HEART RATE: 66 BPM | HEIGHT: 66 IN | SYSTOLIC BLOOD PRESSURE: 132 MMHG | WEIGHT: 256 LBS | DIASTOLIC BLOOD PRESSURE: 64 MMHG

## 2025-01-21 DIAGNOSIS — I25.5 ISCHEMIC CARDIOMYOPATHY: Primary | ICD-10-CM

## 2025-01-21 DIAGNOSIS — Z79.01 CHRONIC ANTICOAGULATION: ICD-10-CM

## 2025-01-21 DIAGNOSIS — Z95.2 S/P MVR (MITRAL VALVE REPLACEMENT): ICD-10-CM

## 2025-01-21 DIAGNOSIS — Z95.2 S/P AVR (AORTIC VALVE REPLACEMENT): ICD-10-CM

## 2025-01-21 PROCEDURE — 99214 OFFICE O/P EST MOD 30 MIN: CPT | Performed by: STUDENT IN AN ORGANIZED HEALTH CARE EDUCATION/TRAINING PROGRAM

## 2025-01-21 NOTE — PATIENT INSTRUCTIONS
Halina Ibrahim,    It was a pleasure to see you today at the [facility].     My recommendations after this visit include:    - Continue current medications.   - Try to increase activity/walking  - Follow up with Dr. Cagle in 6-12 months, sooner if needed    - Please call [phone], if you have any questions or concerns      Johanna Perez PA-C

## 2025-01-21 NOTE — LETTER
1/21/2025    ROSLYN GOTTI  [address]  [place] [address]    RE: Halina Ibrahim       Dear Colleague,     I had the pleasure of seeing Halina Ibrahim in the [facility].    HEART CARE ENCOUNTER NOTE      [facility]  [phone]      Assessment/Recommendations   Assessment:   Valvular heart disease: Status post left atrial myxoma resection with subsequent mechanical mitral valve replacement in 2010 as well as mechanical aortic valve replacement in 2013.  Echocardiogram 12/30/2024 showed normal mechanical prosthesis of both mitral and aortic valve. On warfarin for anticoagulation.   Mild nonischemic cardiomyopathy: LVEF previously 45 to 50%.  More recent echocardiogram 12/30/2024 showed normalization 55 to 60%.  Patient remains on metoprolol and losartan.  Appears euvolemic on exam.  Denies shortness of breath, lower extremity edema.  Hypertension: Well controlled on metoprolol 25 mg daily, losartan 100 mg daily  History of left atrial myxoma: Status post resection in 2010 with subsequent mitral valve replacement.  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 Cagle     History of Present Illness/Subjective    HPI: Halina Ibrahim is a 59 year old female with PMHx of valvular heart disease status post mechanical MVR mechanical AVR in 2010 and 2013, embolic CVA in 2010, Hashimoto's thyroiditis, gastric bypass, MOLLY on CPAP, hypertension presents for follow-up.  In 2010 patient had embolic CVA and during this workup discovered left atrial myxoma with attachment to the mitral valve annulus.  Patient underwent mechanical mitral valve replacement in 2010 as well as mechanical aortic valve replacement due to severe aortic regurgitation in 2013.  Patient last saw Dr. Cagle 7/26/2024 where  echocardiogram had shown mild cardiomyopathy with LVEF 45 to 50%.  Patient underwent CT coronary angiogram 9/3/2024 that showed normal coronary arteries with normal-appearing aortic and mitral mechanical valves and no evidence of mass in the left atrium status post myxoma resection.  More recent echocardiogram 12/30/2024 showed normalization of EF 55 to 60% with normal gradients of mitral and aortic mechanical valves.    Patient has she has been feeling well denies any major cardiac concerns.  Continues to note fatigue but feels this is related to her Hashimoto's.  Patient notes she is fairly sedentary but is able to do chores, climb stairs, go to the grocery without any issue.  Denies exertional angina or dyspnea.  Denies palpitations, lightheadedness, lower extremity edema.  Patient notes not doing any major structured activity.          Echocardiogram 12/30/24 Results:  Left ventricular function is normal.The ejection fraction is 55-60%.  There is mild concentric left ventricular hypertrophy.  Normal right ventricle size and systolic function.  There is a bi-leaflet ([employer]) mechanical prosthesis.  Normal prosthetic mitral valve gradients.  There is a bi-leaflet ([employer] [place]) aortic mechanical prosthesis.  The gradient is normal for this prosthetic aortic valve.    CT coronary angiogram 9/3/2024  Angiography: right dominant coronary anatomy  LM normal  LAD normal  Circ normal  RCA normal     Non coronary:  Normal LV size, function and no WMA  No mass/thrombus in KATHI, LV or PA  Normal aortic  mechanical valve but due to limited views during cardiac cycle can not comment on function  Normal appearing  mitral mechanical valve by appearance, but due to limited views during cardiac cycle can not comment on function  Normal aorta  Normal pericardium     Imp  Normal coronary artery anatomy  Normal appearing aortic and mitral mechanical valves - suggest fluoroscopy to screen for function - eg complete opening and closing  "of leaflets.   No evidence of mass in left atrium (s/p myxoma resection in past)    Echocardiogram 2/21/2024  The left ventricle is normal in size with normal left ventricular wall  thickness. Left ventricular function is decreased. The ejection fraction is  45-50% (mildly reduced).  The right ventricle is normal size with mildly decreased right ventricular  systolic function  There is a bi-leaflet ([employer]) mechanical prosthesis. Normal prosthetic  mitral valve gradients with a mean gradient of 5 mmHg.  There is a bi-leaflet ([employer]) aortic mechanical prosthesis. The peak and  mean prosthetic valve gradients are elevated at 26 and 19 mmHg, respectively).     Compared to the prior study of 2018 the mean mitral gradient has decreased.     Physical Examination  Review of Systems   Vitals: /64 (BP Location: Right arm, Patient Position: Sitting, Cuff Size: Adult Large)   Pulse 66   Resp 17   Ht 1.676 m (5' 6\")   Wt 116.1 kg (256 lb)   BMI 41.32 kg/m    BMI= Body mass index is 41.32 kg/m .  Wt Readings from Last 3 Encounters:   01/21/25 116.1 kg (256 lb)   01/02/25 120.2 kg (265 lb)   09/03/24 121.1 kg (267 lb)           ENT/Mouth: membranes moist, no oral lesions or bleeding gums.      EYES:  no scleral icterus, normal conjunctivae                    Neck: No carotid bruit   Chest/Lungs:   lungs are clear to auscultation, no rales or wheezing,  equal chest wall expansion    Cardiovascular:   Regular.  Mechanical heart sounds with no murmurs, rubs, or gallops; the carotid, radial and posterior tibial pulses are intact,  no edema bilaterally        Extremities: no cyanosis or clubbing   Skin: no xanthelasma, warm.    Neurologic: no tremors     Psychiatric: alert and oriented x3, calm        Please refer above for cardiac ROS details.        Medical History  Surgical History Family History Social History   Past Medical History:   Diagnosis Date     Aortic regurgitation      Aortic valve replaced      " Degenerative disc disease      Dyslipidemia      Hashimoto's disease      History of Nany-en-Y gastric bypass 2004    Dr. Macario Highest weight 296#     Hypertension      Low back pain      Metabolic syndrome      Mitral valve replaced      Morbid obesity (H)      Morbid obesity with BMI of 40.0-44.9, adult (H)      Other and unspecified postsurgical nonabsorption      S/P gastric bypass      S/P MVR (mitral valve replacement)      Stroke (H)      Stroke (H)     from mass near mitral valve     Past Surgical History:   Procedure Laterality Date     AORTIC VALVE REPLACEMENT       OTHER SURGICAL HISTORY      uterine ablation     REDO STERNOTOMY REPLACE VALVE AORTIC  3/14/2013    Procedure: REDO STERNOTOMY REPLACE VALVE AORTIC;  Redo-Median  Sternotomy, Aortic root enlargement . aortic Valve  Replacement  on pump oxygenator;  Surgeon: Buck Paula MD;  Location: [facility] OR     REPLACE VALVE MITRAL       REVISION NANY-EN-Y       s/p gastric bypass[       s/p mitral valve replacement[       TUBAL LIGATION       Family History   Problem Relation Age of Onset     Cancer Mother      Heart Disease Father      Heart Disease Sister      Obesity Sister      Cancer Maternal Grandmother         Social History     Socioeconomic History     Marital status: Single     Spouse name: Not on file     Number of children: Not on file     Years of education: Not on file     Highest education level: Not on file   Occupational History     Not on file   Tobacco Use     Smoking status: Former     Current packs/day: 0.00     Types: Cigarettes     Quit date: 3/17/2010     Years since quittin.8     Smokeless tobacco: Never   Vaping Use     Vaping status: Never Used   Substance and Sexual Activity     Alcohol use: No     Drug use: No     Sexual activity: Never     Birth control/protection: Surgical   Other Topics Concern     Parent/sibling w/ CABG, MI or angioplasty before 65F 55M? Not Asked   Social History Narrative     Not on  file     Social Drivers of Health     Financial Resource Strain: Low Risk  (10/30/2023)    [facility]    Financial Resource Strain      Difficulty of Paying Living Expenses: 3      Difficulty of Paying Living Expenses: Not on file   Food Insecurity: No Food Insecurity (10/30/2023)    Received from [facility]    Food Insecurity      Do you worry your food will run out before you are able to buy more?: 1   Transportation Needs: No Transportation Needs (10/30/2023)    Received from [facility]    Transportation Needs      Does lack of transportation keep you from medical appointments?: 1      Does lack of transportation keep you from work, meetings or getting things that you need?: 1   Physical Activity: Not on file   Stress: Not on file (2/11/2021)   Social Connections: Socially Integrated (10/30/2023)    Received from [facility]    Social Connections      Do you often feel lonely or isolated from those around you?: 0   Interpersonal Safety: Low Risk  (4/13/2021)    Received from [facility]    Intimate Partner Violence      Insults You: Not on file      Threatens You: Not on file      Screams at You: Not on file      Physically Hurt: Not on file      Intimate Partner Violence Score: Not on file   Housing Stability: Low Risk  (10/30/2023)    Received from [facility]    Housing Stability      What is your housing situation today?: 1           Medications  Allergies   Current Outpatient Medications   Medication Sig Dispense Refill     B Complex-C (SUPER B COMPLEX) TABS Take 1 BID       BIOTIN PO Take 10,000 mcg by mouth daily       Calcium 600 MG tablet Take 1 tablet by mouth 2 times daily.       citalopram (CELEXA) 10 MG tablet Take 10 mg by mouth daily       clindamycin  "(CLEOCIN) 300 MG capsule TAKE 2 CAPSULES 1 HOUR PRIOR TO DENTAL APPOINTMENTS.       clobetasol (TEMOVATE) 0.05 % external ointment Apply topically as needed       cyclobenzaprine (FLEXERIL) 5 MG tablet Take 1 tablet (5 mg) by mouth 3 times daily as needed for muscle spasms. 20 tablet 0     gabapentin (NEURONTIN) 100 MG capsule TAKE  1 CAPSULE IN ADDITION  MG PRN.       gabapentin (NEURONTIN) 300 MG capsule Take 3 capsules by mouth at bedtime       levothyroxine (SYNTHROID/LEVOTHROID) 175 MCG tablet        losartan (COZAAR) 100 MG tablet Take 100 mg by mouth       metoprolol succinate ER (TOPROL XL) 25 MG 24 hr tablet TAKE ONE TABLET BY MOUTH EVERY DAY 90 tablet 2     morphine (MS CONTIN) 15 MG CR tablet Take 15 mg by mouth every 12 hours       Multiple Vitamin (DAILY MULTIVITAMIN PO) Take 1 twice a day       naloxone (NARCAN) 4 MG/0.1ML nasal spray Spray 1 spray (4 mg) into one nostril alternating nostrils as needed for opioid reversal. every 2-3 minutes until assistance arrives 2 each 11     Semaglutide-Weight Management (WEGOVY) 1 MG/0.5ML pen Inject 1 mg subcutaneously once a week. 2 mL 0     tretinoin (RETIN-A) 0.05 % external cream Apply topically at bedtime       vitamin D3 (CHOLECALCIFEROL) 125 MCG (5000 UT) tablet Take 5,000 Units by mouth       warfarin (COUMADIN) 2.5 MG tablet Take 2 tablets by mouth daily. 90 tablet 4       Allergies   Allergen Reactions     Tramadol Nausea     No relief and \"funny feeling\" after, not really effective       Trazodone Other (See Comments)     Made restless legs worse     Pcn [Penicillins] Hives          Lab Results    Chemistry/lipid CBC Cardiac Enzymes/BNP/TSH/INR   Recent Labs   Lab Test 09/06/24  0725   CHOL 163   HDL 46*   LDL 88   TRIG 144     Recent Labs   Lab Test 09/06/24  0725 09/12/23  0720 08/30/22  0734   LDL 88 91 104*     Recent Labs   Lab Test 09/06/24  0725      POTASSIUM 4.4   CHLORIDE 107   CO2 25   GLC 99   BUN 12.9   CR 1.04*   GFRESTIMATED " "62   TAMIKA 8.7*     Recent Labs   Lab Test 09/06/24  0725 09/03/24  0853 01/15/24  1522   CR 1.04* 1.1 0.92     Recent Labs   Lab Test 09/06/24  0725 09/12/23  0720 08/30/22  0734   A1C 5.5 5.5 5.5          Recent Labs   Lab Test 09/06/24  0725   WBC 8.3   HGB 12.3   HCT 38.3   MCV 85        Recent Labs   Lab Test 09/06/24  0725 09/12/23  0720 08/30/22  0734   HGB 12.3 12.8 12.7    No results for input(s): \"TROPONINI\" in the last 46141 hours.  No results for input(s): \"BNP\", \"NTBNPI\", \"NTBNP\" in the last 77037 hours.  No results for input(s): \"TSH\" in the last 02401 hours.  Recent Labs   Lab Test 09/20/23  1546   INR 3.14*          Johanna Perez PA-C                                         Thank you for allowing me to participate in the care of your patient.      Sincerely,     Johanna Perez PA-C     [facility]  cc:   Anuja Cagle MD  [address]  [place] [address]      "

## 2025-01-22 ENCOUNTER — VIRTUAL VISIT (OUTPATIENT)
Dept: SURGERY | Facility: CLINIC | Age: 60
End: 2025-01-22
Attending: FAMILY MEDICINE
Payer: COMMERCIAL

## 2025-01-22 VITALS — WEIGHT: 256 LBS | BODY MASS INDEX: 41.14 KG/M2 | HEIGHT: 66 IN

## 2025-01-22 DIAGNOSIS — E66.01 MORBID OBESITY WITH BMI OF 40.0-44.9, ADULT (H): Primary | ICD-10-CM

## 2025-01-22 DIAGNOSIS — K90.9 INTESTINAL MALABSORPTION, UNSPECIFIED TYPE: ICD-10-CM

## 2025-01-22 DIAGNOSIS — Z86.69 HISTORY OF MIGRAINE HEADACHES: ICD-10-CM

## 2025-01-22 PROCEDURE — 98007 SYNCH AUDIO-VIDEO EST HI 40: CPT | Performed by: FAMILY MEDICINE

## 2025-01-22 PROCEDURE — G2211 COMPLEX E/M VISIT ADD ON: HCPCS | Performed by: FAMILY MEDICINE

## 2025-01-22 RX ORDER — SEMAGLUTIDE 1 MG/.5ML
1 INJECTION, SOLUTION SUBCUTANEOUS WEEKLY
Qty: 2 ML | Refills: 4 | Status: SHIPPED | OUTPATIENT
Start: 2025-01-22

## 2025-01-22 NOTE — LETTER
1/22/2025      Halina Ibrahim  [address] [address]      Dear Colleague,    Thank you for referring your patient, Halina Ibrahim, to the [facility] [place]. Please see a copy of my visit note below.    Bariatric Care Clinic Non Surgical Follow up Visit   Date of visit: 1/22/2025  Physician: DURAN Glasgow MD, MD  Primary Care is Lu Carrillo  Halina Ibrahim   59 year old  female     Initial Weight: 296# prior to RNY on 2004  Today's Weight:   Wt Readings from Last 1 Encounters:   01/22/25 116.1 kg (256 lb)     Body mass index is 41.34 kg/m .           Assessment and Plan   Assessment: Halina is a 59 year old year old female who presents for medical weight management.      Plan:    1. Morbid obesity with BMI of 40.0-44.9, adult (H) (Primary)  Patient was congratulated on her success thus far. Healthy habits to assist with further weight loss were discussed. She will check out the [employer] and be fit videos. She will continue to work on getting in protein and vegetables. She will continue the Wegovy 1.0 mg weekly for now. We discussed the patient's co-morbid conditions including CAD and migraine headaches. These likely will improve with healthy habits and weight loss.     2. History of migraine headaches  This may improve with healthy habits and weight loss.     3. Post operative malabsorption  Patient is taking all vitamins as directed.  She was reminded to slow down, chew foods thoroughly, and to avoid liquids within 30 minutes of solids. Written information was given. She was congratulated on her success thus far.      Follow up in September for routine annual visit.           INTERIM HISTORY  Patient restarted wegovy for appetite and craving control after her last visit in September. She thinks it is helping. She is now taking the 1.0 mg dose. She feels full with smaller portions. She denies side effects.    Goals set with dietician 1/14/25:  Aim  to have a protein source with breakfast  Try prepping breakfast options  Try creating a routine with movement    DIETARY HISTORY  Meals Per Day: 3  Eating Protein First?: working on it  Food Diary: B:hard boiled eggs or frozen omlette or Sánchez Tejinder breakfast bowl or bagel L:similar to breakfast or sandwich or soup or chicken pot pie D:burger or chicken, rare vegetables  Snacks Per Day: vegetables, bagels (cut back to 1 per day(  Fluid Intake: 64 oz  Portion Control: improved  Calorie Containing Beverages: pepsi close to a can every day  Meals at Restaurant per week:0-1    Positive Changes Since Last Visit: she has cut back on daily bagels, focusing on protein  Struggling With: drinking soda, vegetable intake, exercise    Knowledgeable in Reading Food Labels: yes  Getting Adequate Protein: working on it      PHYSICAL ACTIVITY PATTERNS:  She is working with her puppy, she is considering trying chair yoga    REVIEW OF SYSTEMS  GENERAL/CONSTITUTIONAL:  Fatigue:yes  HEENT:  CARDIOVASCULAR:  History of heart disease: yes  GI:  Pancreatitis: no  PSYCHIATRIC:  Moods: stable  MUSCULOSKELETAL/RHEUMATOLOGIC  Arthralgias: yes  Myalgias: yes  ENDOCRINE:  Monitoring Blood Sugars: no  Sugars Well Controlled: na  No personal or family history of medullary thyroid cancer no  No personal or family history of MEN2   :  Birth control: menopause  History of kidney stones: not discussed     Patient Profile   Social History     Social History Narrative     Not on file        Past Medical History   Past Medical History:   Diagnosis Date     Aortic regurgitation      Aortic valve replaced      Degenerative disc disease      Dyslipidemia      Hashimoto's disease      History of Sabrina-en-Y gastric bypass 9/14/2004    Dr. Macario Highest weight 296#     Hypertension      Low back pain      Metabolic syndrome      Mitral valve replaced      Morbid obesity (H)      Morbid obesity with BMI of 40.0-44.9, adult (H)      Other and unspecified  "postsurgical nonabsorption      S/P gastric bypass      S/P MVR (mitral valve replacement)      Stroke (H)      Stroke (H)     from mass near mitral valve     Patient Active Problem List   Diagnosis     Aortic valve disorder     Mitral valve replaced     Restless leg syndrome     Morbid obesity (H)     History of cholecystectomy     closed displaced fracture of proximal end of left humerus, initial encounter  DOI: 09/27/24     Hx of gastric bypass 09/14/2004     Chronic anticoagulation- warfarin due to cardiac valves replaced     TIA (transient ischemic attack) (H)  03/04/2010       Past Surgical History  She has a past surgical history that includes s/p mitral valve replacement[; s/p gastric bypass[; Redo sternotomy replace valve aortic (3/14/2013); tubal ligation; other surgical history; Revision Sabrina-En-Y; aortic valve replacement; and Replace valve mitral.     Examination   Ht 1.676 m (5' 5.98\")   Wt 116.1 kg (256 lb)   BMI 41.34 kg/m    Wt Readings from Last 4 Encounters:   01/22/25 116.1 kg (256 lb)   01/21/25 116.1 kg (256 lb)   01/02/25 120.2 kg (265 lb)   09/03/24 121.1 kg (267 lb)      BP Readings from Last 3 Encounters:   01/21/25 132/64   10/03/24 128/86   09/27/24 (!) 169/86      GENERAL: alert and no distress  EYES: Eyes grossly normal to inspection.  No discharge or erythema, or obvious scleral/conjunctival abnormalities.  RESP: No audible wheeze, cough, or visible cyanosis.    SKIN: Visible skin clear. No significant rash, abnormal pigmentation or lesions.  NEURO: Cranial nerves grossly intact.  Mentation and speech appropriate for age.  PSYCH: Appropriate affect, tone, and pace of words        Counseling:   We reviewed the important post op bariatric recommendations:  -eating 3 meals daily  -eating protein first, getting >60gm protein daily  -eating slowly, chewing food well  -avoiding/limiting calorie containing beverages  -limiting starchy vegetables and carbohydrates, choosing wheat, not white " with breads,   crackers, pastas, madelyn, bagels, tortillas, rice  -limiting restaurant or cafeteria eating to twice a week or less    We discussed the importance of restorative sleep and stress management in maintaining a healthy weight.  We discussed the National Weight Control Registry healthy weight maintenance strategies and ways to optimize metabolism.  We discussed the importance of physical activity including cardiovascular and strength training in maintaining a healthier weight.    Total time spent on the date of this encounter doing: chart review, review of test results, patient visit, physical exam, education, counseling, developing plan of care and documenting = 43 minutes.         DURAN Glasgow MD  [facility]           Virtual Visit Details    Type of service:  Video Visit     Originating Location (pt. Location): Home    Distant Location (provider location):  Off-site  Platform used for Video Visit: [employer]  Video start time: 1:10 pm  Video end time: 1:37 pm         Again, thank you for allowing me to participate in the care of your patient.        Sincerely,        DURAN Glasgow MD    Electronically signed

## 2025-01-22 NOTE — PROGRESS NOTES
Virtual Visit Details    Type of service:  Video Visit     Originating Location (pt. Location): Home    Distant Location (provider location):  Off-site  Platform used for Video Visit: [employer]  Video start time: 1:10 pm  Video end time: 1:37 pm

## 2025-01-22 NOTE — NURSING NOTE
Current patient location: [address] [place] [id number]    Is the patient currently in the state of [place]? YES    Visit mode:VIDEO    If the visit is dropped, the patient can be reconnected by: VIDEO VISIT: Text to cell phone:   Telephone Information:   Mobile [phone]       Will anyone else be joining the visit? NO  (If patient encounters technical issues they should call [phone]    Are changes needed to the allergy or medication list? Pt stated no changes to allergies and Pt stated no med changes    Are refills needed on medications prescribed by this physician? YESwegovy    Reason for visit: RECHECK    Felisha SOTO

## 2025-01-22 NOTE — PROGRESS NOTES
[facility] Non Surgical Follow up Visit   Date of visit: 1/22/2025  Physician: DURAN Glasgow MD, MD  Primary Care is Lu Carrillo  Halina Ibrahim   59 year old  female     Initial Weight: 296# prior to RNY on 2004  Today's Weight:   Wt Readings from Last 1 Encounters:   01/22/25 116.1 kg (256 lb)     Body mass index is 41.34 kg/m .           Assessment and Plan   Assessment: Halina is a 59 year old year old female who presents for medical weight management.      Plan:    1. Morbid obesity with BMI of 40.0-44.9, adult (H) (Primary)  Patient was congratulated on her success thus far. Healthy habits to assist with further weight loss were discussed. She will check out the [employer] Sit and be fit videos. She will continue to work on getting in protein and vegetables. She will continue the Wegovy 1.0 mg weekly for now. We discussed the patient's co-morbid conditions including CAD and migraine headaches. These likely will improve with healthy habits and weight loss.     2. History of migraine headaches  This may improve with healthy habits and weight loss.     3. Post operative malabsorption  Patient is taking all vitamins as directed.  She was reminded to slow down, chew foods thoroughly, and to avoid liquids within 30 minutes of solids. Written information was given. She was congratulated on her success thus far.      Follow up in September for routine annual visit.           INTERIM HISTORY  Patient restarted wegovy for appetite and craving control after her last visit in September. She thinks it is helping. She is now taking the 1.0 mg dose. She feels full with smaller portions. She denies side effects.    Goals set with dietician 1/14/25:  Aim to have a protein source with breakfast  Try prepping breakfast options  Try creating a routine with movement    DIETARY HISTORY  Meals Per Day: 3  Eating Protein First?: working on it  Food Diary: B:hard boiled eggs or frozen omlette or Sánchez Tejinder breakfast bowl  or bagel L:similar to breakfast or sandwich or soup or chicken pot pie D:burger or chicken, rare vegetables  Snacks Per Day: vegetables, bagels (cut back to 1 per day(  Fluid Intake: 64 oz  Portion Control: improved  Calorie Containing Beverages: pepsi close to a can every day  Meals at Restaurant per week:0-1    Positive Changes Since Last Visit: she has cut back on daily bagels, focusing on protein  Struggling With: drinking soda, vegetable intake, exercise    Knowledgeable in Reading Food Labels: yes  Getting Adequate Protein: working on it      PHYSICAL ACTIVITY PATTERNS:  She is working with her puppy, she is considering trying chair yoga    REVIEW OF SYSTEMS  GENERAL/CONSTITUTIONAL:  Fatigue:yes  HEENT:  CARDIOVASCULAR:  History of heart disease: yes  GI:  Pancreatitis: no  PSYCHIATRIC:  Moods: stable  MUSCULOSKELETAL/RHEUMATOLOGIC  Arthralgias: yes  Myalgias: yes  ENDOCRINE:  Monitoring Blood Sugars: no  Sugars Well Controlled: na  No personal or family history of medullary thyroid cancer no  No personal or family history of MEN2   :  Birth control: menopause  History of kidney stones: not discussed     Patient Profile   Social History     Social History Narrative    Not on file        Past Medical History   Past Medical History:   Diagnosis Date    Aortic regurgitation     Aortic valve replaced     Degenerative disc disease     Dyslipidemia     Hashimoto's disease     History of Sabrina-en-Y gastric bypass 9/14/2004    Dr. Macario Highest weight 296#    Hypertension     Low back pain     Metabolic syndrome     Mitral valve replaced     Morbid obesity (H)     Morbid obesity with BMI of 40.0-44.9, adult (H)     Other and unspecified postsurgical nonabsorption     S/P gastric bypass     S/P MVR (mitral valve replacement)     Stroke (H)     Stroke (H)     from mass near mitral valve     Patient Active Problem List   Diagnosis    Aortic valve disorder    Mitral valve replaced    Restless leg syndrome    Morbid  "obesity (H)    History of cholecystectomy    closed displaced fracture of proximal end of left humerus, initial encounter  DOI: 09/27/24    Hx of gastric bypass 09/14/2004    Chronic anticoagulation- warfarin due to cardiac valves replaced    TIA (transient ischemic attack) (H)  03/04/2010       Past Surgical History  She has a past surgical history that includes s/p mitral valve replacement[; s/p gastric bypass[; Redo sternotomy replace valve aortic (3/14/2013); tubal ligation; other surgical history; Revision Sabrina-En-Y; aortic valve replacement; and Replace valve mitral.     Examination   Ht 1.676 m (5' 5.98\")   Wt 116.1 kg (256 lb)   BMI 41.34 kg/m    Wt Readings from Last 4 Encounters:   01/22/25 116.1 kg (256 lb)   01/21/25 116.1 kg (256 lb)   01/02/25 120.2 kg (265 lb)   09/03/24 121.1 kg (267 lb)      BP Readings from Last 3 Encounters:   01/21/25 132/64   10/03/24 128/86   09/27/24 (!) 169/86      GENERAL: alert and no distress  EYES: Eyes grossly normal to inspection.  No discharge or erythema, or obvious scleral/conjunctival abnormalities.  RESP: No audible wheeze, cough, or visible cyanosis.    SKIN: Visible skin clear. No significant rash, abnormal pigmentation or lesions.  NEURO: Cranial nerves grossly intact.  Mentation and speech appropriate for age.  PSYCH: Appropriate affect, tone, and pace of words        Counseling:   We reviewed the important post op bariatric recommendations:  -eating 3 meals daily  -eating protein first, getting >60gm protein daily  -eating slowly, chewing food well  -avoiding/limiting calorie containing beverages  -limiting starchy vegetables and carbohydrates, choosing wheat, not white with breads,   crackers, pastas, madelyn, bagels, tortillas, rice  -limiting restaurant or cafeteria eating to twice a week or less    We discussed the importance of restorative sleep and stress management in maintaining a healthy weight.  We discussed the National Weight Control Registry " healthy weight maintenance strategies and ways to optimize metabolism.  We discussed the importance of physical activity including cardiovascular and strength training in maintaining a healthier weight.    Total time spent on the date of this encounter doing: chart review, review of test results, patient visit, physical exam, education, counseling, developing plan of care and documenting = 43 minutes.         DURAN Glasgow MD  [facility]

## 2025-01-22 NOTE — PATIENT INSTRUCTIONS
Here are the Sypherlinkube exercise sites:    Yoga-https://www.Tensorcom.com/user/yogawithadriene    Body strength activities, dance, high intensity interval training- https://www.Tensorcom.com/user/popsugartvfit    Strength training- https://www.Tensorcom.com/user/KozakSportsPerform    Walking- https://www.Tensorcom.com/user/walkathomemedia    Sit and Be Fit- https://www.Tensorcom.Only [employer]/channel/UCLgvL3aGzMByecNYtMcyK_g    Low impact cardio- Ronit Fung- https://www.Tensorcom.[employer]/channel/IGfiSPvHripOhxwZ3qjerykJ    Cardio Yeceniaangela Paniagua- https://www.Tensorcom.Only [employer] Pet [employer]/channel/WMIpEdae3HJhPBYWC8cIpMSd    30 Min low impact cardio- https://www.Sypherlinkube.com/watch?v=gC_L9qAHVJ8    Body Project- https://www.Tensorcom.[employer]/channel/ZHVkc3K99-XtHpLAyMlmF0MJ      [facility]  Nutritional Guidelines  Gastric Bypass 18 Months Post Op and Beyond    General Guidelines and Helpful Hints:  Eat 3 meals per day + protein supplement(s). No snacks between meals.  Do not skip meals.  This can cause overeating at the next meal and will prevent adequate protein and nutritional intake.  Aim for 60-80 grams of protein per day.  Always eat your protein first. This assists with optimal nutrition and helps you stay full longer.  Depending on your portion size, you may need to drink approved protein supplement between meals to achieve protein goals. Follow recommendations of your Dietitian.   Eat your protein first, and then follow with fiber.   It is not necessary to count your fiber, but 15-20 grams per day is recommended.    Add fiber by including fruits, vegetables, whole grains, and beans.   Portions should remain about 1 cup per meal. Use measuring cups to be accurate.  Continue to use saucer/salad plates, infant/toddler silverware to keep portion sizes small and take small bites.  Eat S-L-O-W-L-Y to make each meal last 20-30 minutes. Always stop eating when satisfied.  Continue to use caution with foods containing skins, peels or membranes.  Chew well!  Aim for 64 oz. of calorie-free fluids daily.  Continue to avoid caffeine and carbonation. If you choose to drink alcohol, do so in moderation.   Remember to avoid drinking during meals, 15-30 minutes before and 30 minutes after.  Exercise is brady for continued weight loss and weight maintenance. Aim for 30-60 minutes of physical activity most days of the week. Include cardiovascular and strength training.  If having trouble tolerating meat, try using a crock-pot, tinfoil tent, steamer or other moist cooking method to create tender meats. Add broth or low-fat gravy to help meat stay moist.   Avoid high sugar and high fat foods to prevent dumping syndrome.  Check nutrition labels for less than 10 grams of sugar and less than 10 grams of fat per serving.  Continue Taking Vitamins/Minerals:  1195-3033 mcg of Sublingual B-12 daily  1 Multivitamin with Iron twice daily (chewable or swallow tabs)  500-600 mg Calcium Citrate twice daily (chewable or swallow tabs)  5000 IU Vitamin D3 daily  An additional iron tablet for menstruating females  You may also need an additional thiamine or B Complex tablet    Sample Grocery List    Protein:  Fat free Greek or light yogurt (less than 10 grams sugar)  Fat free or low-fat cottage cheese  String cheese or reduced fat cheese slices  Tuna, salmon, crab, egg, or chicken salad made with light or fat free mayonnaise  Egg or Egg Substitute  Lean/extra lean turkey, beef, bison, venison (ground, sirloin, round, flank)  Pork loin or tenderloin (grilled, baked, broiled)  Fish such as salmon, tuna, trout, tilapia, etc. (grilled, baked, broiled)  Tender cuts of lean (skinless) turkey or chicken  Lean deli meats: turkey, lean ham, chicken, lean roast beef  Beans such as kidney, garbanzo, black, clinton, or low-fat/fat free refried beans  Peanut butter (natural preferred). Limit to 1 Tbsp. per day.  Low-fat meatloaf (made with lean ground beef or turkey)  Sloppy Joes made with low-sugar  ketchup and lean ground beef or turkey  Soy or vegetable protein (i.e. vegan crumbles, soy/veggie burger, tofu)  Hummus    Vegetables:  Fresh: cooked or raw (as tolerated)  Frozen vegetables  Canned vegetables (low sodium or no salt added, rinse before cooking/eating)  (Ok to have skins/peels/membranes/seeds - just chew well)    Fruits:  Fresh fruit  Frozen fruit (no sugar added)  Canned fruit (packed in its own juice, NOT syrup)  (Ok to have skins/peels/membranes/seeds - just chew well)    Starch:  Unsweetened whole-grain hot cereal (or high fiber cold cereal, dry)  Toasted whole wheat bread or [place] Thins  Whole grain crackers  Baked /boiled/mashed potato/sweet potato  Cooked whole grain pasta, brown rice, or other cooked whole grains  Starchy vegetables: corn, peas, winter squash    Protein Supplement:   Ready to drink protein shake with:  15-30 grams protein per serving  Less than 10 grams total carbohydrate per serving   Protein powder mixed with:   Skim or 1% milk  Low fat or fat free Lactaid milk, plain or no sugar added soymilk  Water     Fats: (use in moderation)  1 teaspoon of soft tub margarine  1 teaspoon olive oil, canola oil, or peanut oil  1 tablespoon of low-fat deluca or salad dressing     Sample Menu for 18+ months after Gastric Bypass    You do NOT need to eat/drink the full portion sizes listed below  Always stop when you are satisfied    Breakfast   cup 1% cottage cheese     cup mixed berries   Lunch 2 oz lean roast beef on   [place] Thin with 1 tsp. light deluca    small tomato, chopped, mixed with 1 tsp. light vinaigrette dressing   Supplement Approved protein supplement (if needed between meals)   Dinner 2 oz grilled salmon    cup salad greens with 1 tsp. light salad dressing and 1 tsp. ground flax seed    cup quinoa or brown rice     Breakfast   cup egg substitute with   cup sautéed chopped vegetables  2 light [place] Krisp crackers   Lunch Tuna Melt:   cup tuna mixed with 1 tsp. light deluca over    [place] Thin. Top with 2-3 slices cucumber and 1 oz slice of low fat cheese   Supplement 1 cup skim milk (if needed between meals)   Dinner 3 oz  grilled, broiled, or baked seasoned skinless chicken breast    cup asparagus     Breakfast   cup plain oatmeal made with skim or 1% milk with 1 Tbsp. flavored/unflavored protein powder added  1 mozzarella string cheese   Lunch 2 oz deli turkey breast  1/3 cup salad with 1 tsp. light salad dressing, 1/8 of a whole avocado and 1 Tbsp. sunflower seeds   Dinner 3 oz. pork loin made in a crock pot, seasoned with a spice rub    cup cooked carrots   Supplement Approved protein supplement (if needed between meals)     Breakfast 1 cup breakfast casserole made with egg substitute, turkey sausage,  and steamed, chopped bell peppers   Supplement  1 cup light Greek yogurt (if needed between meals)   Lunch 2 oz. teriyaki turkey    cup mashed sweet potato with 1-2 spritzes of spray butter (like Parkay)    cup fresh pineapple   Dinner 3 oz low fat meatloaf    cup roasted garlic zucchini     Breakfast   cup leftover breakfast casserole    cup no sugar added applesauce with 1 Tbsp. unflavored protein powder and a sprinkle of cinnamon    Lunch 3 oz shrimp with 1-2 Tbsp. low-sugar cocktail sauce for dipping    c. whole wheat pasta drizzled with   tsp. olive oil   Supplement 1 cup skim/1% milk with scoop of protein powder (if needed between meals)   Dinner Grilled, seasoned kebob with 2 oz lean beef and   cup vegetables     Breakfast Breakfast pizza:   [place] Thin spread with 1 Tbsp. low sugar spaghetti sauce,   cup shredded low fat cheese, melted and 1 slice of Sammarinese [place]     cup fresh fruit mixed with chopped almonds   Lunch   cup black bean soup  4-5 whole grain crackers   Dinner 3 oz  tilapia with lemon pepper seasoning    cup stewed tomatoes   Supplement 1 string cheese (if needed between meals)     Breakfast 2 hard boiled eggs (discard 1 egg yolk)    whole wheat English Muffin  with 1 tsp. low sugar jelly   Lunch   cup leftover black bean soup topped with 1-2 Tbsp. low fat cheese  2-3 light [place] Krisp crackers   Supplement Approved protein supplement (if needed between meals)   Dinner 3 oz sirloin steak    cup steamed broccoli

## 2025-03-17 ENCOUNTER — TELEPHONE (OUTPATIENT)
Dept: SURGERY | Facility: CLINIC | Age: 60
End: 2025-03-17
Payer: COMMERCIAL

## 2025-03-17 NOTE — TELEPHONE ENCOUNTER
Prior Authorization Retail Medication Request    Medication/Dose: Wegovy 1mg/0.5ml  New/renewal/insurance change PA/secondary ins. PA: Renewal  Previously Tried and Failed:  Pt has been taking this medication with good results at the 1mg dose.     Insurance   Primary: [employer]   Insurance ID:  [id number]     Clinic Information  Preferred routing pool for dept communication: Bariatric Surgery Support Pool [place]

## 2025-03-19 NOTE — TELEPHONE ENCOUNTER
Central Prior Authorization Team - Phone: [phone]     PA Initiation    Medication: WEGOVY 1 MG/0.5ML SC SOAJ  Insurance Company: [employer] - Phone [phone] Fax [phone]  [facility] [id number] [facility]  Filling Pharmacy Phone: [phone]  Filling Pharmacy Fax: [phone]  Start Date: 3/19/2025

## 2025-03-21 NOTE — TELEPHONE ENCOUNTER
Central Prior Authorization Team - Phone: [phone]     Prior Authorization Approval    Medication: WEGOVY 1 MG/0.5ML SC SOAJ  Authorization Effective Date: 3/19/2025  Authorization Expiration Date: 3/19/2026  Approved Dose/Quantity: 2  Reference #:     Insurance Company: [employer] - Phone [phone] Fax [phone]  Expected CoPay: $    CoPay Card Available:      Financial Assistance Needed:   Which Pharmacy is filling the prescription: [facility] [place] - [address] [place] [address]  Pharmacy Notified: YES  Patient Notified: YES Instructed pharmacy to notify patient once order is ready.

## 2025-03-31 NOTE — PROGRESS NOTES
Halina Ibrahim is a 59 year old who is being evaluated via a billable video visit.      How would you like to obtain your AVS? MyChart  If the video visit is dropped, the invitation should be resent by: Text to cell phone: [phone]  Will anyone else be joining your video visit? No        Post-op Surgical Weight Loss Diet Evaluation     Assessment:  Pt presents for post-op RD visit, s/p RNY in 2004. Today we reviewed current eating habits and level of physical activity, and instructed on the changes that are required for successful bariatric outcomes.    Weight loss medication: Wegovy     Patient Progress: Patient stated that she has been focusing on her protein intake with meals. Patient had to put her senior dog down last week. Discussed the importance with fiber intake with meals. Patient would like to start chair exercises.     Initial weight: 296 lbs  Current weight: 256 lbs (1/22/2025)  Weight change: 40 lbs, 13.5% weight loss    BMI: 41.34    Patient Active Problem List   Diagnosis    Aortic valve disorder    Mitral valve replaced    Restless leg syndrome    Morbid obesity (H)    History of cholecystectomy    closed displaced fracture of proximal end of left humerus, initial encounter  DOI: 09/27/24    Hx of gastric bypass 09/14/2004    Chronic anticoagulation- warfarin due to cardiac valves replaced    TIA (transient ischemic attack) (H)  03/04/2010   Diabetes: no, A1C of 5.5% on 9/6/2024     Diarrhea: no  Constipation: no    *patient will go through phases with eating fruit  *no protein shakes  Diet Recall/Time:   Breakfast: hard boiled eggs with bagel and cream cheese OR egg bites (cheese/mushroom or cheese/red peppers) with bagel and cream cheese  Lunch: leftovers OR egg bites  Dinner: egg bites OR chicken (pre-seasoned) OR boneless pork chops OR prime rib burger with cheese OR Sánchez Tejinder breakfast Bowl or sandwich with turkey     Typical Snacks: cottage cheese with pineapple    Proteins/Veg/Fruits/CHO  "(NOT well tolerated): pork sausage    Fluid Intake  Water: 64+ ounces    Exercise:   No set routine at this time     Patient has one dogs and with busy taking care of them     Patient broke her shoulder in September 2024 - was doing physical therapy and will do arm exercises at home     Patient has had issues with her left hip joint - very painful - does have leg exercise    PES statement:      (NC-1.4) Altered GI Function related to Alteration in gastrointestinal tract structure and/or function/ Decreased functional length of the GI tract as evidenced by Weight loss of 41.43% initial body weight; Gastric bypass surgery    Intervention    Discussion  Recommended to consume 15-20gm protein at 3 meals daily, along with protein supplement/\"planned protein containing snack\" of 15-30gm protein, to reach goal of 60-80 gm protein daily.  Educated on post-op vitamin regimen: Multi Vit + iron 2x/day, calcium citrate 400-600 mg 2x/day, 9578-2009 mcg of Sublingual B-12 daily, and 5000 IU Vitamin D3 daily (MVI and calcium can be taken at the same time BID)  Reviewed lean protein sources  Bariatric Plate Method-  including lean/low fat protein at each meal, including a vegetable/fruit, and limiting carbohydrate intake to less than 25% of plate volume.     Instructions  Include 15-20gm protein at each meal, along with protein supplement/\"planned protein containing snack\" of 15-30gm protein, to reach goal of 60-80 gm protein daily.  Increase fluid intake to 64oz daily: choose plain or calorie/carbonation-free beverages.  Incorporate daily structured activity, 30-60 minutes most days of the week  Recommended pt to start taking: Multi Vit + iron 2x/day, calcium citrate 400-600 mg 2x/day, 1603-3137 mcg of Sublingual B-12 daily, and 5000 IU Vitamin D3 daily. (MVI and calcium can be taken at the same time)  Read food labels more consistently: keeping total fat grams <10, total sugar grams <10, fiber >3gm per serving.  Increase " vegetable/fruit intake, by having a vegetable or fruit with each meal daily.  Practice plate method: 1/2 plate lean/low fat protein source, vegetable/fruit, <25% of plate complex carbohydrates.  Separate fluids 30 minutes before/after meal times.  Practice eating off of smaller plates/bowls, chewing to applesauce consistency, taking 20-30 minutes to eat in a calm/relaxed environment without distractions of tv/email/cell phone.    Handouts provided:  Fiber Foods  Low Impact Workouts    Assessment/Plan:    Pt to follow up for  21 year  post-op visit with bariatrician and 2.5 month(s) with dietitian    Video-Visit Details    Type of service:  Video Visit    Video Start Time (time video started):  10:59 AM    Video End Time (time video stopped):  11:16 AM    Originating Location (pt. Location): Home      Distant Location (provider location):  Off-site    Mode of Communication:  Video Conference via [facility]    Physician has received verbal consent for a Video Visit from the patient? Yes    Wanda Temple RD

## 2025-04-03 ENCOUNTER — VIRTUAL VISIT (OUTPATIENT)
Dept: SURGERY | Facility: CLINIC | Age: 60
End: 2025-04-03
Payer: COMMERCIAL

## 2025-04-03 DIAGNOSIS — E66.01 MORBID OBESITY WITH BMI OF 40.0-44.9, ADULT (H): Primary | ICD-10-CM

## 2025-04-03 DIAGNOSIS — Z98.84 S/P GASTRIC BYPASS: ICD-10-CM

## 2025-04-03 DIAGNOSIS — Z71.3 NUTRITIONAL COUNSELING: ICD-10-CM

## 2025-04-03 DIAGNOSIS — K91.2 POSTOPERATIVE MALABSORPTION: ICD-10-CM

## 2025-04-03 NOTE — LETTER
4/3/2025      Halina Ibrahim  [address] [facility] [address]      Dear Colleague,    Thank you for referring your patient, Halina Ibrahim, to the [facility] SURGERY CL[facility] AND BARIATRICS CARE [place]. Please see a copy of my visit note below.    Halina Ibrahim is a 59 year old who is being evaluated via a billable video visit.      How would you like to obtain your AVS? MyChart  If the video visit is dropped, the invitation should be resent by: Text to cell phone: [phone]  Will anyone else be joining your video visit? No        Post-op Surgical Weight Loss Diet Evaluation     Assessment:  Pt presents for post-op RD visit, s/p RNY in 2004. Today we reviewed current eating habits and level of physical activity, and instructed on the changes that are required for successful bariatric outcomes.    Weight loss medication: Wegovy     Patient Progress: Patient stated that she has been focusing on her protein intake with meals. Patient had to put her senior dog down last week. Discussed the importance with fiber intake with meals. Patient would like to start chair exercises.     Initial weight: 296 lbs  Current weight: 256 lbs (1/22/2025)  Weight change: 40 lbs, 13.5% weight loss    BMI: 41.34    Patient Active Problem List   Diagnosis     Aortic valve disorder     Mitral valve replaced     Restless leg syndrome     Morbid obesity (H)     History of cholecystectomy     closed displaced fracture of proximal end of left humerus, initial encounter  DOI: 09/27/24     Hx of gastric bypass 09/14/2004     Chronic anticoagulation- warfarin due to cardiac valves replaced     TIA (transient ischemic attack) (H)  03/04/2010   Diabetes: no, A1C of 5.5% on 9/6/2024     Diarrhea: no  Constipation: no    *patient will go through phases with eating fruit  *no protein shakes  Diet Recall/Time:   Breakfast: hard boiled eggs with bagel and cream cheese OR egg bites (cheese/mushroom or cheese/red peppers) with bagel and  "cream cheese  Lunch: leftovers OR egg bites  Dinner: egg bites OR chicken (pre-seasoned) OR boneless pork chops OR prime rib burger with cheese OR Sánchez Tejinder breakfast Bowl or sandwich with turkey     Typical Snacks: cottage cheese with pineapple    Proteins/Veg/Fruits/CHO (NOT well tolerated): pork sausage    Fluid Intake  Water: 64+ ounces    Exercise:   No set routine at this time     Patient has one dogs and with busy taking care of them     Patient broke her shoulder in September 2024 - was doing physical therapy and will do arm exercises at home     Patient has had issues with her left hip joint - very painful - does have leg exercise    PES statement:      (NC-1.4) Altered GI Function related to Alteration in gastrointestinal tract structure and/or function/ Decreased functional length of the GI tract as evidenced by Weight loss of 41.43% initial body weight; Gastric bypass surgery    Intervention    Discussion  Recommended to consume 15-20gm protein at 3 meals daily, along with protein supplement/\"planned protein containing snack\" of 15-30gm protein, to reach goal of 60-80 gm protein daily.  Educated on post-op vitamin regimen: Multi Vit + iron 2x/day, calcium citrate 400-600 mg 2x/day, 8132-2381 mcg of Sublingual B-12 daily, and 5000 IU Vitamin D3 daily (MVI and calcium can be taken at the same time BID)  Reviewed lean protein sources  Bariatric Plate Method-  including lean/low fat protein at each meal, including a vegetable/fruit, and limiting carbohydrate intake to less than 25% of plate volume.     Instructions  Include 15-20gm protein at each meal, along with protein supplement/\"planned protein containing snack\" of 15-30gm protein, to reach goal of 60-80 gm protein daily.  Increase fluid intake to 64oz daily: choose plain or calorie/carbonation-free beverages.  Incorporate daily structured activity, 30-60 minutes most days of the week  Recommended pt to start taking: Multi Vit + iron 2x/day, calcium " citrate 400-600 mg 2x/day, 8743-4011 mcg of Sublingual B-12 daily, and 5000 IU Vitamin D3 daily. (MVI and calcium can be taken at the same time)  Read food labels more consistently: keeping total fat grams <10, total sugar grams <10, fiber >3gm per serving.  Increase vegetable/fruit intake, by having a vegetable or fruit with each meal daily.  Practice plate method: 1/2 plate lean/low fat protein source, vegetable/fruit, <25% of plate complex carbohydrates.  Separate fluids 30 minutes before/after meal times.  Practice eating off of smaller plates/bowls, chewing to applesauce consistency, taking 20-30 minutes to eat in a calm/relaxed environment without distractions of tv/email/cell phone.    Handouts provided:  Fiber Foods  Low Impact Workouts    Assessment/Plan:    Pt to follow up for  21 year  post-op visit with bariatrician and 2.5 month(s) with dietitian    Video-Visit Details    Type of service:  Video Visit    Video Start Time (time video started):  10:59 AM    Video End Time (time video stopped):  11:16 AM    Originating Location (pt. Location): Home      Distant Location (provider location):  Off-site    Mode of Communication:  Video Conference via [facility]    Physician has received verbal consent for a Video Visit from the patient? Yes    Wanda Temple RD             Again, thank you for allowing me to participate in the care of your patient.        Sincerely,        Wanda Temple RD    Electronically signed

## 2025-04-16 DIAGNOSIS — R09.89 DECREASED CARDIAC FUNCTION: ICD-10-CM

## 2025-04-16 DIAGNOSIS — Z95.2 S/P MVR (MITRAL VALVE REPLACEMENT): ICD-10-CM

## 2025-04-16 DIAGNOSIS — Z95.2 S/P AVR (AORTIC VALVE REPLACEMENT): ICD-10-CM

## 2025-04-16 RX ORDER — METOPROLOL SUCCINATE 25 MG/1
25 TABLET, EXTENDED RELEASE ORAL DAILY
Qty: 90 TABLET | Refills: 3 | Status: SHIPPED | OUTPATIENT
Start: 2025-04-16

## 2025-06-25 NOTE — PROGRESS NOTES
Halina Ibrahim is a 59 year old who is being evaluated via a billable video visit.      How would you like to obtain your AVS? MyChart  If the video visit is dropped, the invitation should be resent by: Text to cell phone: [phone]  Will anyone else be joining your video visit? No    Post-op Surgical Weight Loss Diet Evaluation     Assessment:  Pt presents for post-op RD visit, s/p RNY in 2004. Today we reviewed current eating habits and level of physical activity, and instructed on the changes that are required for successful bariatric outcomes.     Weight loss medication: Wegovy     Patient Progress: Patient stated that she feels like has been eating more pasta as of late. Patient stated that she will have the [employer] GF mac n cheese cups. Patient stated that she continues to struggle with fiber intake. Patient stated that she feels like her protein and been limited. Patient stated that she has not implemented exercises d/t busy time at lunch. Patient stated that she would like to focus on her protein intake - encouraged to use her food scale to understand her protein intake.     Initial weight: 296 lbs  Current weight: 255.2 lbs (4/22/2025)  Weight change: 40.8 lbs, 13.8% weight loss    BMI: 41.30    Patient Active Problem List   Diagnosis    Aortic valve disorder    Mitral valve replaced    Restless leg syndrome    Morbid obesity (H)    History of cholecystectomy    closed displaced fracture of proximal end of left humerus, initial encounter  DOI: 09/27/24    Hx of gastric bypass 09/14/2004    Chronic anticoagulation- warfarin due to cardiac valves replaced    TIA (transient ischemic attack) (H)  03/04/2010   Diabetes: no, A1C of 5.5% on 9/6/2024     *patient will go through phases with eating fruit  *no protein shakes  Diet Recall/Time:   Breakfast: breakfast bowls with turkey sausage OR Sánchez Tejinder Delight sandwiches  Lunch: leftovers OR egg bites OR tuna creations with crackers OR a sandwich with  "ham/turkey OR pineapple and cottage cheese  Dinner: soup OR small meal depending lunch time OR chicken breasts with sides - will make extra and then have leftovers  Snack before bed: cheese and crackers    Proteins/Veg/Fruits/CHO (NOT well tolerated): pork sausage     Estimated protein intake: <60 grams    Fluid Intake  Water: 32-34 ounces  Caffeine: pop  Carbonation: pepsi zero - 1 can/day (on work days) - may open another of regular pepsi and have a few sips from it    Exercise:   No set routine at this time     Patient has one dogs and with busy taking care of them     Patient broke her shoulder in September 2024 - was doing physical therapy and will do arm exercises at home     Patient has had issues with her left hip joint - very painful - does have leg exercise    PES statement:      (NC-1.4) Altered GI Function related to Alteration in gastrointestinal tract structure and/or function/ Decreased functional length of the GI tract as evidenced by Weight loss of 13.8% initial body weight; Gastric bypass surgery    Intervention    Discussion  Recommended to consume 15-20gm protein at 3 meals daily, along with protein supplement/\"planned protein containing snack\" of 15-30gm protein, to reach goal of 60-80 gm protein daily.  Educated on post-op vitamin regimen: Multi Vit + iron 2x/day, calcium citrate 400-600 mg 2x/day, 9456-2293 mcg of Sublingual B-12 daily, and 5000 IU Vitamin D3 daily (MVI and calcium can be taken at the same time BID)  Reviewed lean protein sources  Bariatric Plate Method-  including lean/low fat protein at each meal, including a vegetable/fruit, and limiting carbohydrate intake to less than 25% of plate volume.     Instructions  Include 15-20gm protein at each meal, along with protein supplement/\"planned protein containing snack\" of 15-30gm protein, to reach goal of 60-80 gm protein daily.  Increase fluid intake to 64oz daily: choose plain or calorie/carbonation-free beverages.  Incorporate " daily structured activity, 30-60 minutes most days of the week  Recommended pt to start taking: Multi Vit + iron 2x/day, calcium citrate 400-600 mg 2x/day, 8946-5433 mcg of Sublingual B-12 daily, and 5000 IU Vitamin D3 daily. (MVI and calcium can be taken at the same time)  Read food labels more consistently: keeping total fat grams <10, total sugar grams <10, fiber >3gm per serving.  Increase vegetable/fruit intake, by having a vegetable or fruit with each meal daily.  Practice plate method: 1/2 plate lean/low fat protein source, vegetable/fruit, <25% of plate complex carbohydrates.  Separate fluids 30 minutes before/after meal times.  Practice eating off of smaller plates/bowls, chewing to applesauce consistency, taking 20-30 minutes to eat in a calm/relaxed environment without distractions of tv/email/cell phone.    Handouts provided:  Portions control without measuring  10/10 rule and making sense of food labels  Food log  Protein sources    Assessment/Plan:    Pt to follow up in 3 months (21 year post-op) visit with bariatrician and 2 months with dietitian    Video-Visit Details    Type of service:  Video Visit    Video Start Time (time video started): 1:32 PM    Video End Time (time video stopped): 2:00 PM    Originating Location (pt. Location): Home      Distant Location (provider location):  Off-site    Mode of Communication:  Video Conference via [employer]    Physician has received verbal consent for a Video Visit from the patient? Yes    Wanda Temple RD

## 2025-06-26 ENCOUNTER — VIRTUAL VISIT (OUTPATIENT)
Dept: SURGERY | Facility: CLINIC | Age: 60
End: 2025-06-26
Payer: COMMERCIAL

## 2025-06-26 DIAGNOSIS — K91.2 POSTOPERATIVE MALABSORPTION: ICD-10-CM

## 2025-06-26 DIAGNOSIS — Z71.3 NUTRITIONAL COUNSELING: ICD-10-CM

## 2025-06-26 DIAGNOSIS — E66.01 MORBID OBESITY WITH BMI OF 40.0-44.9, ADULT (H): Primary | ICD-10-CM

## 2025-06-26 DIAGNOSIS — Z98.84 S/P GASTRIC BYPASS: ICD-10-CM

## 2025-06-26 NOTE — LETTER
6/26/2025      Halina Ibrahim  [address] [facility] [address]      Dear Colleague,    Thank you for referring your patient, Halina Ibrahim, to the [facility] SURGERY CLINIC AND BARIATRICS CARE [place]. Please see a copy of my visit note below.    Halina Ibrahim is a 59 year old who is being evaluated via a billable video visit.      How would you like to obtain your AVS? MyChart  If the video visit is dropped, the invitation should be resent by: Text to cell phone: [phone]  Will anyone else be joining your video visit? No    Post-op Surgical Weight Loss Diet Evaluation     Assessment:  Pt presents for post-op RD visit, s/p RNY in 2004. Today we reviewed current eating habits and level of physical activity, and instructed on the changes that are required for successful bariatric outcomes.     Weight loss medication: Wegovy     Patient Progress: Patient stated that she feels like has been eating more pasta as of late. Patient stated that she will have the [employer] GF mac n cheese cups. Patient stated that she continues to struggle with fiber intake. Patient stated that she feels like her protein and been limited. Patient stated that she has not implemented exercises d/t busy time at lunch. Patient stated that she would like to focus on her protein intake - encouraged to use her food scale to understand her protein intake.     Initial weight: 296 lbs  Current weight: 255.2 lbs (4/22/2025)  Weight change: 40.8 lbs, 13.8% weight loss    BMI: 41.30    Patient Active Problem List   Diagnosis     Aortic valve disorder     Mitral valve replaced     Restless leg syndrome     Morbid obesity (H)     History of cholecystectomy     closed displaced fracture of proximal end of left humerus, initial encounter  DOI: 09/27/24     Hx of gastric bypass 09/14/2004     Chronic anticoagulation- warfarin due to cardiac valves replaced     TIA (transient ischemic attack) (H)  03/04/2010   Diabetes: no, A1C of 5.5% on  "9/6/2024     *patient will go through phases with eating fruit  *no protein shakes  Diet Recall/Time:   Breakfast: breakfast bowls with turkey sausage OR Sánchez Tejinder Delight sandwiches  Lunch: leftovers OR egg bites OR tuna creations with crackers OR a sandwich with ham/turkey OR pineapple and cottage cheese  Dinner: soup OR small meal depending lunch time OR chicken breasts with sides - will make extra and then have leftovers  Snack before bed: cheese and crackers    Proteins/Veg/Fruits/CHO (NOT well tolerated): pork sausage     Estimated protein intake: <60 grams    Fluid Intake  Water: 32-34 ounces  Caffeine: pop  Carbonation: pepsi zero - 1 can/day (on work days) - may open another of regular pepsi and have a few sips from it    Exercise:   No set routine at this time     Patient has one dogs and with busy taking care of them     Patient broke her shoulder in September 2024 - was doing physical therapy and will do arm exercises at home     Patient has had issues with her left hip joint - very painful - does have leg exercise    PES statement:      (NC-1.4) Altered GI Function related to Alteration in gastrointestinal tract structure and/or function/ Decreased functional length of the GI tract as evidenced by Weight loss of 13.8% initial body weight; Gastric bypass surgery    Intervention    Discussion  Recommended to consume 15-20gm protein at 3 meals daily, along with protein supplement/\"planned protein containing snack\" of 15-30gm protein, to reach goal of 60-80 gm protein daily.  Educated on post-op vitamin regimen: Multi Vit + iron 2x/day, calcium citrate 400-600 mg 2x/day, 0730-3815 mcg of Sublingual B-12 daily, and 5000 IU Vitamin D3 daily (MVI and calcium can be taken at the same time BID)  Reviewed lean protein sources  Bariatric Plate Method-  including lean/low fat protein at each meal, including a vegetable/fruit, and limiting carbohydrate intake to less than 25% of plate volume. " "    Instructions  Include 15-20gm protein at each meal, along with protein supplement/\"planned protein containing snack\" of 15-30gm protein, to reach goal of 60-80 gm protein daily.  Increase fluid intake to 64oz daily: choose plain or calorie/carbonation-free beverages.  Incorporate daily structured activity, 30-60 minutes most days of the week  Recommended pt to start taking: Multi Vit + iron 2x/day, calcium citrate 400-600 mg 2x/day, 3700-8899 mcg of Sublingual B-12 daily, and 5000 IU Vitamin D3 daily. (MVI and calcium can be taken at the same time)  Read food labels more consistently: keeping total fat grams <10, total sugar grams <10, fiber >3gm per serving.  Increase vegetable/fruit intake, by having a vegetable or fruit with each meal daily.  Practice plate method: 1/2 plate lean/low fat protein source, vegetable/fruit, <25% of plate complex carbohydrates.  Separate fluids 30 minutes before/after meal times.  Practice eating off of smaller plates/bowls, chewing to applesauce consistency, taking 20-30 minutes to eat in a calm/relaxed environment without distractions of tv/email/cell phone.    Handouts provided:  Portions control without measuring  10/10 rule and making sense of food labels  Food log  Protein sources    Assessment/Plan:    Pt to follow up in 3 months (21 year post-op) visit with bariatrician and 2 months with dietitian    Video-Visit Details    Type of service:  Video Visit    Video Start Time (time video started): 1:32 PM    Video End Time (time video stopped): 2:00 PM    Originating Location (pt. Location): Home      Distant Location (provider location):  Off-site    Mode of Communication:  Video Conference via [facility]    Physician has received verbal consent for a Video Visit from the patient? Yes    Wanda Temple RD           Again, thank you for allowing me to participate in the care of your patient.        Sincerely,        Wanda Temple RD    Electronically signed"

## 2025-06-26 NOTE — PATIENT INSTRUCTIONS
Portion Control Without Measuring     Food Label: The 10-10 Rule    Making Sense of Food Labels     Daily Food and Exercise Log      PROTEIN SOURCES    Animal Protein Sources  Type of Food Portion Grams of Protein Calories   Chicken (light meat, no skin)  3 ounces  27 140   Chicken (canned, white, packed in water)  3 ounces  15 80   Turkey (light meat, no skin)  3 ounces  24 160   Ham (lean)  3 ounces  19 118   Hamburger (lean)  3 ounces  16 150   Pork Tenderloin  3 ounces  22 125   Pork Chop  1 chop (~5 ounces) 39 226   Steak (sirloin, trimmed)  3 ounces  23 207   Game Meat (Venison, [place], [place], [place])  3 ounces  21 - 26 124 - 207   Tuna (packed in water)  1 can, drained (5 ounces) 20 90   Flounder, Foster  3 ounces  15 81   Halibut, Cod  3 ounces  19 94   [place]   3 ounces  19 175   Shrimp  ~3 ounces (20 shrimp) 22 120   Scallops  ~3 ounces (7 scallops) 19 98   Lobster (steamed)  3 ounces  22 122   Imitation Seafood (ex: crabmeat)  3 ounces  6.5 81   Luncheon Meat (uncured)  4 slices  15 92   Egg (large)  1 egg 6 72   Egg White (large)  1 egg white 4 17   Liquid Egg Substitute  4 tablespoons or   cup  7 32     Dairy Protein Sources  Type of Food Portion Grams of Protein Calories   Protein Shake*  1 shake 15 - 30  90 - 180   Nonfat, Greek yogurt, flavored or plain*   (10 grams sugar or less)    cup/6 ounce container  12 - 20 80 - 140   Cottage Cheese (skim, 1%)*    cup  12 - 14 70 - 90   Ricotta Cheese (part skim)    cup  4 80   Milk (skim, 1%)  1 cup 8 90 - 110   Mozzarella String Cheese, light  1 stick  7 60   Cheddar Cheese Stick  1 stick  5 80   Shredded Cheese*    cup  6 - 7 80 - 110   Sliced Cheese*  1 slice  4 - 5 70 - 80   *Nutrition information varies by brand       Vegetarian & Vegan Protein Sources   Type of Food Portion Grams of Protein Calories   Plant-based protein shake*  1 shake 100 - 210 17 - 27   Plain or light, flavored soymilk*  1 cup  7 - 8 80 - 129   Plain or light, hemp milk  1 cup  4  101   Black, Kidney, Day, Perez, White Beans, Lentils, Split Peas    cup  7 - 9 108 - 125   Chickpeas (Garbanzo Beans)    cup  7 135   Falafel Patties*  Three (85 g)  9 150   Edamame    cup  9  112   Tempeh*    cup  17 160   Tofu, firm*    cup  15 - 23 104 - 192   Veggie Burger*  One, 100-120 g  15 - 20 177 - 270   Seitan*  1 serving, 85 g  21 120   Dashawn, Flax, or Sunflower Seeds (shelled)    cup  7 204 - 209   Hemp Seeds, hulled    cup  13 224   Pumpkin Seeds    cup  11 207   Sesame Tahini  2 tablespoons 5 107   Almonds    cup  8 204   Brazil, Cashew, Alyssa, Pine, Walnuts    cup  5 190 - 227   Pistachios    cup  7 186   Nut Butter (peanut, almond)*  2 tablespoons 6 - 7 190   *Nutrition information varies by brand

## 2025-07-10 DIAGNOSIS — E66.01 MORBID OBESITY WITH BMI OF 40.0-44.9, ADULT (H): ICD-10-CM

## 2025-07-10 RX ORDER — SEMAGLUTIDE 1 MG/.5ML
1 INJECTION, SOLUTION SUBCUTANEOUS WEEKLY
Qty: 2 ML | Refills: 4 | Status: SHIPPED | OUTPATIENT
Start: 2025-07-10

## 2025-08-04 ENCOUNTER — MYC MEDICAL ADVICE (OUTPATIENT)
Dept: SURGERY | Facility: CLINIC | Age: 60
End: 2025-08-04
Payer: COMMERCIAL

## 2025-08-04 DIAGNOSIS — Z98.84 S/P GASTRIC BYPASS: Primary | ICD-10-CM

## 2025-08-04 DIAGNOSIS — K90.9 INTESTINAL MALABSORPTION, UNSPECIFIED TYPE: ICD-10-CM

## 2025-08-04 DIAGNOSIS — K91.2 POSTOPERATIVE MALABSORPTION: ICD-10-CM

## 2025-08-21 ENCOUNTER — LAB (OUTPATIENT)
Dept: LAB | Facility: CLINIC | Age: 60
End: 2025-08-21
Payer: COMMERCIAL

## 2025-08-21 DIAGNOSIS — Z98.84 S/P GASTRIC BYPASS: ICD-10-CM

## 2025-08-21 DIAGNOSIS — K90.9 INTESTINAL MALABSORPTION, UNSPECIFIED TYPE: ICD-10-CM

## 2025-08-21 DIAGNOSIS — K91.2 POSTOPERATIVE MALABSORPTION: ICD-10-CM

## 2025-08-21 LAB
ALBUMIN SERPL BCG-MCNC: 3.7 G/DL (ref 3.5–5.2)
ALP SERPL-CCNC: 111 U/L (ref 40–150)
ALT SERPL W P-5'-P-CCNC: 30 U/L (ref 0–50)
ANION GAP SERPL CALCULATED.3IONS-SCNC: 8 MMOL/L (ref 7–15)
AST SERPL W P-5'-P-CCNC: 38 U/L (ref 0–45)
BILIRUB SERPL-MCNC: 0.4 MG/DL
BUN SERPL-MCNC: 15.7 MG/DL (ref 8–23)
CALCIUM SERPL-MCNC: 9.1 MG/DL (ref 8.8–10.4)
CHLORIDE SERPL-SCNC: 105 MMOL/L (ref 98–107)
CHOLEST SERPL-MCNC: 149 MG/DL
CREAT SERPL-MCNC: 1.03 MG/DL (ref 0.51–0.95)
EGFRCR SERPLBLD CKD-EPI 2021: 62 ML/MIN/1.73M2
ERYTHROCYTE [DISTWIDTH] IN BLOOD BY AUTOMATED COUNT: 13.7 % (ref 10–15)
EST. AVERAGE GLUCOSE BLD GHB EST-MCNC: 100 MG/DL
FASTING STATUS PATIENT QL REPORTED: YES
FASTING STATUS PATIENT QL REPORTED: YES
FERRITIN SERPL-MCNC: 139 NG/ML (ref 11–328)
FOLATE SERPL-MCNC: 37.8 NG/ML (ref 4.6–34.8)
GLUCOSE SERPL-MCNC: 91 MG/DL (ref 70–99)
HBA1C MFR BLD: 5.1 % (ref 0–5.6)
HCO3 SERPL-SCNC: 30 MMOL/L (ref 22–29)
HCT VFR BLD AUTO: 39.8 % (ref 35–47)
HDLC SERPL-MCNC: 51 MG/DL
HGB BLD-MCNC: 12.8 G/DL (ref 11.7–15.7)
LDLC SERPL CALC-MCNC: 84 MG/DL
MAGNESIUM SERPL-MCNC: 1.9 MG/DL (ref 1.7–2.3)
MCH RBC QN AUTO: 27.8 PG (ref 26.5–33)
MCHC RBC AUTO-ENTMCNC: 32.2 G/DL (ref 31.5–36.5)
MCV RBC AUTO: 86.3 FL (ref 78–100)
NONHDLC SERPL-MCNC: 98 MG/DL
PLATELET # BLD AUTO: 251 10E3/UL (ref 150–450)
POTASSIUM SERPL-SCNC: 5 MMOL/L (ref 3.4–5.3)
PROT SERPL-MCNC: 6.2 G/DL (ref 6.4–8.3)
PTH-INTACT SERPL-MCNC: 78 PG/ML (ref 15–65)
RBC # BLD AUTO: 4.61 10E6/UL (ref 3.8–5.2)
SODIUM SERPL-SCNC: 143 MMOL/L (ref 135–145)
TRIGL SERPL-MCNC: 72 MG/DL
VIT B12 SERPL-MCNC: 1053 PG/ML (ref 232–1245)
VIT D+METAB SERPL-MCNC: 74 NG/ML (ref 20–50)
WBC # BLD AUTO: 7.36 10E3/UL (ref 4–11)

## 2025-08-24 LAB
ANNOTATION COMMENT IMP: NORMAL
RETINYL PALMITATE SERPL-MCNC: <0.02 MG/L
VIT A SERPL-MCNC: 0.44 MG/L
VIT B1 PYROPHOSHATE BLD-SCNC: 291 NMOL/L

## 2025-08-28 ENCOUNTER — VIRTUAL VISIT (OUTPATIENT)
Dept: SURGERY | Facility: CLINIC | Age: 60
End: 2025-08-28
Payer: COMMERCIAL

## 2025-08-28 DIAGNOSIS — Z98.84 S/P GASTRIC BYPASS: ICD-10-CM

## 2025-08-28 DIAGNOSIS — K91.2 POSTOPERATIVE MALABSORPTION: ICD-10-CM

## 2025-08-28 DIAGNOSIS — Z71.3 NUTRITIONAL COUNSELING: ICD-10-CM

## 2025-08-28 DIAGNOSIS — E66.01 MORBID OBESITY WITH BMI OF 40.0-44.9, ADULT (H): Primary | ICD-10-CM
